# Patient Record
Sex: FEMALE | Race: WHITE | Employment: OTHER | URBAN - METROPOLITAN AREA
[De-identification: names, ages, dates, MRNs, and addresses within clinical notes are randomized per-mention and may not be internally consistent; named-entity substitution may affect disease eponyms.]

---

## 2017-01-18 RX ORDER — BUTALBITAL, ASPIRIN, AND CAFFEINE 325; 50; 40 MG/1; MG/1; MG/1
1 CAPSULE ORAL EVERY 6 HOURS PRN
Qty: 40 CAPSULE | Refills: 2 | Status: SHIPPED | OUTPATIENT
Start: 2017-01-18 | End: 2017-04-14 | Stop reason: SDUPTHER

## 2017-02-07 DIAGNOSIS — K21.9 GASTROESOPHAGEAL REFLUX DISEASE WITHOUT ESOPHAGITIS: ICD-10-CM

## 2017-02-07 RX ORDER — LEVOTHYROXINE SODIUM 88 UG/1
TABLET ORAL
Qty: 90 TABLET | Refills: 0 | Status: SHIPPED | OUTPATIENT
Start: 2017-02-07 | End: 2017-05-14 | Stop reason: SDUPTHER

## 2017-02-07 RX ORDER — PANTOPRAZOLE SODIUM 40 MG/1
TABLET, DELAYED RELEASE ORAL
Qty: 90 TABLET | Refills: 0 | Status: SHIPPED | OUTPATIENT
Start: 2017-02-07 | End: 2017-06-15 | Stop reason: SDUPTHER

## 2017-03-16 RX ORDER — MONTELUKAST SODIUM 10 MG/1
TABLET ORAL
Qty: 90 TABLET | Refills: 0 | Status: SHIPPED | OUTPATIENT
Start: 2017-03-16 | End: 2017-06-15 | Stop reason: SDUPTHER

## 2017-04-06 ENCOUNTER — TELEPHONE (OUTPATIENT)
Dept: FAMILY MEDICINE CLINIC | Age: 52
End: 2017-04-06

## 2017-04-06 RX ORDER — FLUCONAZOLE 150 MG/1
150 TABLET ORAL ONCE
Qty: 2 TABLET | Refills: 0 | Status: SHIPPED | OUTPATIENT
Start: 2017-04-06 | End: 2017-04-06

## 2017-04-15 RX ORDER — BUTALBITAL, ASPIRIN, AND CAFFEINE 325; 50; 40 MG/1; MG/1; MG/1
CAPSULE ORAL
Qty: 40 CAPSULE | Refills: 0 | Status: SHIPPED | OUTPATIENT
Start: 2017-04-15 | End: 2017-05-14 | Stop reason: SDUPTHER

## 2017-05-15 RX ORDER — BUTALBITAL, ASPIRIN, AND CAFFEINE 325; 50; 40 MG/1; MG/1; MG/1
CAPSULE ORAL
Qty: 40 CAPSULE | Refills: 0 | Status: SHIPPED | OUTPATIENT
Start: 2017-05-15 | End: 2017-06-15 | Stop reason: SDUPTHER

## 2017-05-15 RX ORDER — LEVOTHYROXINE SODIUM 88 UG/1
TABLET ORAL
Qty: 90 TABLET | Refills: 0 | Status: SHIPPED | OUTPATIENT
Start: 2017-05-15 | End: 2017-06-15 | Stop reason: SDUPTHER

## 2017-05-25 ENCOUNTER — TELEPHONE (OUTPATIENT)
Dept: FAMILY MEDICINE CLINIC | Age: 52
End: 2017-05-25

## 2017-05-26 ENCOUNTER — OFFICE VISIT (OUTPATIENT)
Dept: FAMILY MEDICINE CLINIC | Age: 52
End: 2017-05-26

## 2017-05-26 VITALS
RESPIRATION RATE: 16 BRPM | WEIGHT: 170 LBS | OXYGEN SATURATION: 97 % | HEART RATE: 84 BPM | DIASTOLIC BLOOD PRESSURE: 70 MMHG | SYSTOLIC BLOOD PRESSURE: 118 MMHG | TEMPERATURE: 97.7 F | BODY MASS INDEX: 29.64 KG/M2

## 2017-05-26 DIAGNOSIS — I10 ESSENTIAL HYPERTENSION: ICD-10-CM

## 2017-05-26 DIAGNOSIS — R73.9 HYPERGLYCEMIA: Primary | ICD-10-CM

## 2017-05-26 LAB
GLUCOSE BLD-MCNC: 93 MG/DL
HBA1C MFR BLD: 5.5 %

## 2017-05-26 PROCEDURE — 82962 GLUCOSE BLOOD TEST: CPT | Performed by: NURSE PRACTITIONER

## 2017-05-26 PROCEDURE — 99213 OFFICE O/P EST LOW 20 MIN: CPT | Performed by: NURSE PRACTITIONER

## 2017-05-26 PROCEDURE — 83036 HEMOGLOBIN GLYCOSYLATED A1C: CPT | Performed by: NURSE PRACTITIONER

## 2017-05-26 ASSESSMENT — ENCOUNTER SYMPTOMS
VOMITING: 0
DIARRHEA: 0
EYE PAIN: 0
SHORTNESS OF BREATH: 0
NAUSEA: 0
COUGH: 0

## 2017-06-15 DIAGNOSIS — K21.9 GASTROESOPHAGEAL REFLUX DISEASE WITHOUT ESOPHAGITIS: ICD-10-CM

## 2017-06-16 RX ORDER — MONTELUKAST SODIUM 10 MG/1
TABLET ORAL
Qty: 90 TABLET | Refills: 0 | Status: SHIPPED | OUTPATIENT
Start: 2017-06-16 | End: 2017-09-09 | Stop reason: SDUPTHER

## 2017-06-16 RX ORDER — LOSARTAN POTASSIUM 25 MG/1
TABLET ORAL
Qty: 90 TABLET | Refills: 0 | Status: SHIPPED | OUTPATIENT
Start: 2017-06-16 | End: 2017-09-09 | Stop reason: SDUPTHER

## 2017-06-16 RX ORDER — BUTALBITAL, ASPIRIN, AND CAFFEINE 325; 50; 40 MG/1; MG/1; MG/1
CAPSULE ORAL
Qty: 40 CAPSULE | Refills: 0 | Status: SHIPPED | OUTPATIENT
Start: 2017-06-16 | End: 2017-07-14 | Stop reason: SDUPTHER

## 2017-06-16 RX ORDER — PANTOPRAZOLE SODIUM 40 MG/1
TABLET, DELAYED RELEASE ORAL
Qty: 90 TABLET | Refills: 0 | Status: SHIPPED | OUTPATIENT
Start: 2017-06-16 | End: 2017-09-14 | Stop reason: SDUPTHER

## 2017-06-16 RX ORDER — LEVOTHYROXINE SODIUM 88 UG/1
TABLET ORAL
Qty: 90 TABLET | Refills: 0 | Status: SHIPPED | OUTPATIENT
Start: 2017-06-16 | End: 2017-11-11 | Stop reason: SDUPTHER

## 2017-06-20 ENCOUNTER — OFFICE VISIT (OUTPATIENT)
Dept: FAMILY MEDICINE CLINIC | Age: 52
End: 2017-06-20

## 2017-06-20 VITALS
WEIGHT: 163 LBS | DIASTOLIC BLOOD PRESSURE: 88 MMHG | HEART RATE: 86 BPM | TEMPERATURE: 98.6 F | HEIGHT: 64 IN | RESPIRATION RATE: 16 BRPM | SYSTOLIC BLOOD PRESSURE: 130 MMHG | BODY MASS INDEX: 27.83 KG/M2

## 2017-06-20 DIAGNOSIS — Z12.31 SCREENING MAMMOGRAM, ENCOUNTER FOR: ICD-10-CM

## 2017-06-20 DIAGNOSIS — S16.1XXS CERVICAL STRAIN, SEQUELA: ICD-10-CM

## 2017-06-20 DIAGNOSIS — E55.9 VITAMIN D DEFICIENCY: ICD-10-CM

## 2017-06-20 DIAGNOSIS — G43.111 INTRACTABLE MIGRAINE WITH AURA WITH STATUS MIGRAINOSUS: ICD-10-CM

## 2017-06-20 DIAGNOSIS — E78.5 HYPERLIPIDEMIA WITH TARGET LDL LESS THAN 130: ICD-10-CM

## 2017-06-20 DIAGNOSIS — Z00.00 WELL ADULT HEALTH CHECK: Primary | ICD-10-CM

## 2017-06-20 DIAGNOSIS — J45.40 MODERATE PERSISTENT ASTHMA WITHOUT COMPLICATION: ICD-10-CM

## 2017-06-20 DIAGNOSIS — E03.9 ACQUIRED HYPOTHYROIDISM: ICD-10-CM

## 2017-06-20 DIAGNOSIS — I10 ESSENTIAL HYPERTENSION: ICD-10-CM

## 2017-06-20 LAB
A/G RATIO: 1.5 (ref 1.1–2.2)
ALBUMIN SERPL-MCNC: 4 G/DL (ref 3.4–5)
ALP BLD-CCNC: 68 U/L (ref 40–129)
ALT SERPL-CCNC: 9 U/L (ref 10–40)
ANION GAP SERPL CALCULATED.3IONS-SCNC: 16 MMOL/L (ref 3–16)
AST SERPL-CCNC: 12 U/L (ref 15–37)
BILIRUB SERPL-MCNC: 0.4 MG/DL (ref 0–1)
BUN BLDV-MCNC: 17 MG/DL (ref 7–20)
CALCIUM SERPL-MCNC: 9.3 MG/DL (ref 8.3–10.6)
CHLORIDE BLD-SCNC: 104 MMOL/L (ref 99–110)
CHOLESTEROL, TOTAL: 184 MG/DL (ref 0–199)
CO2: 22 MMOL/L (ref 21–32)
CREAT SERPL-MCNC: 0.7 MG/DL (ref 0.6–1.1)
GFR AFRICAN AMERICAN: >60
GFR NON-AFRICAN AMERICAN: >60
GLOBULIN: 2.7 G/DL
GLUCOSE BLD-MCNC: 102 MG/DL (ref 70–99)
HDLC SERPL-MCNC: 56 MG/DL (ref 40–60)
LDL CHOLESTEROL CALCULATED: 108 MG/DL
POTASSIUM SERPL-SCNC: 4 MMOL/L (ref 3.5–5.1)
SODIUM BLD-SCNC: 142 MMOL/L (ref 136–145)
TOTAL PROTEIN: 6.7 G/DL (ref 6.4–8.2)
TRIGL SERPL-MCNC: 99 MG/DL (ref 0–150)
TSH SERPL DL<=0.05 MIU/L-ACNC: 1.08 UIU/ML (ref 0.27–4.2)
VITAMIN D 25-HYDROXY: 52.5 NG/ML
VLDLC SERPL CALC-MCNC: 20 MG/DL

## 2017-06-20 PROCEDURE — 99396 PREV VISIT EST AGE 40-64: CPT | Performed by: FAMILY MEDICINE

## 2017-06-20 RX ORDER — DIAZEPAM 5 MG/1
10 TABLET ORAL EVERY 12 HOURS PRN
Qty: 10 TABLET | Refills: 0 | Status: SHIPPED | OUTPATIENT
Start: 2017-06-20 | End: 2017-09-18

## 2017-06-20 ASSESSMENT — PATIENT HEALTH QUESTIONNAIRE - PHQ9
SUM OF ALL RESPONSES TO PHQ QUESTIONS 1-9: 2
1. LITTLE INTEREST OR PLEASURE IN DOING THINGS: 1
SUM OF ALL RESPONSES TO PHQ9 QUESTIONS 1 & 2: 2
2. FEELING DOWN, DEPRESSED OR HOPELESS: 1

## 2017-07-15 RX ORDER — BUTALBITAL, ASPIRIN, AND CAFFEINE 325; 50; 40 MG/1; MG/1; MG/1
CAPSULE ORAL
Qty: 40 CAPSULE | Refills: 0 | Status: SHIPPED | OUTPATIENT
Start: 2017-07-15 | End: 2017-08-15 | Stop reason: SDUPTHER

## 2017-08-07 ENCOUNTER — TELEPHONE (OUTPATIENT)
Dept: FAMILY MEDICINE CLINIC | Age: 52
End: 2017-08-07

## 2017-08-16 RX ORDER — BUTALBITAL, ASPIRIN, AND CAFFEINE 325; 50; 40 MG/1; MG/1; MG/1
CAPSULE ORAL
Qty: 40 CAPSULE | Refills: 3 | Status: SHIPPED | OUTPATIENT
Start: 2017-08-16 | End: 2017-12-15 | Stop reason: SDUPTHER

## 2017-09-09 RX ORDER — MONTELUKAST SODIUM 10 MG/1
TABLET ORAL
Qty: 90 TABLET | Refills: 0 | Status: SHIPPED | OUTPATIENT
Start: 2017-09-09 | End: 2017-12-15 | Stop reason: SDUPTHER

## 2017-09-09 RX ORDER — LOSARTAN POTASSIUM 25 MG/1
TABLET ORAL
Qty: 90 TABLET | Refills: 0 | Status: SHIPPED | OUTPATIENT
Start: 2017-09-09 | End: 2019-03-14 | Stop reason: ALTCHOICE

## 2017-09-14 DIAGNOSIS — K21.9 GASTROESOPHAGEAL REFLUX DISEASE WITHOUT ESOPHAGITIS: ICD-10-CM

## 2017-09-15 RX ORDER — PANTOPRAZOLE SODIUM 40 MG/1
TABLET, DELAYED RELEASE ORAL
Qty: 90 TABLET | Refills: 0 | Status: SHIPPED | OUTPATIENT
Start: 2017-09-15 | End: 2017-12-15 | Stop reason: SDUPTHER

## 2017-09-22 ENCOUNTER — TELEPHONE (OUTPATIENT)
Dept: FAMILY MEDICINE CLINIC | Age: 52
End: 2017-09-22

## 2017-09-22 RX ORDER — FLUCONAZOLE 150 MG/1
150 TABLET ORAL ONCE
Qty: 1 TABLET | Refills: 0 | Status: SHIPPED | OUTPATIENT
Start: 2017-09-22 | End: 2017-09-22

## 2017-10-01 DIAGNOSIS — G43.111 INTRACTABLE MIGRAINE WITH AURA WITH STATUS MIGRAINOSUS: ICD-10-CM

## 2017-10-02 ENCOUNTER — TELEPHONE (OUTPATIENT)
Dept: FAMILY MEDICINE CLINIC | Age: 52
End: 2017-10-02

## 2017-10-02 DIAGNOSIS — G43.111 INTRACTABLE MIGRAINE WITH AURA WITH STATUS MIGRAINOSUS: Primary | ICD-10-CM

## 2017-10-02 DIAGNOSIS — N95.1 MENOPAUSAL SYNDROME: ICD-10-CM

## 2017-10-02 RX ORDER — SUMATRIPTAN 20 MG/1
SPRAY NASAL
Qty: 6 EACH | Refills: 0 | Status: SHIPPED | OUTPATIENT
Start: 2017-10-02 | End: 2018-04-27 | Stop reason: SDUPTHER

## 2017-10-02 NOTE — TELEPHONE ENCOUNTER
PT. called and needs estradiol (ESTRACE) 1 MG tablet Refilled     Side note:  Interested in a Beta Blocker again. Thinking it might help her migraines. ..         Please advise thanks

## 2017-10-04 ENCOUNTER — TELEPHONE (OUTPATIENT)
Dept: FAMILY MEDICINE CLINIC | Age: 52
End: 2017-10-04

## 2017-10-04 RX ORDER — ESTRADIOL 1 MG/1
1 TABLET ORAL DAILY
Qty: 90 TABLET | Refills: 3 | Status: SHIPPED | OUTPATIENT
Start: 2017-10-04 | End: 2020-07-01 | Stop reason: SDUPTHER

## 2017-10-04 RX ORDER — METOPROLOL SUCCINATE 25 MG/1
25 TABLET, EXTENDED RELEASE ORAL DAILY
Qty: 90 TABLET | Refills: 1 | Status: SHIPPED | OUTPATIENT
Start: 2017-10-04 | End: 2018-05-16 | Stop reason: SDUPTHER

## 2017-10-04 RX ORDER — ATENOLOL 25 MG/1
25 TABLET ORAL DAILY
Qty: 90 TABLET | Refills: 1 | Status: SHIPPED | OUTPATIENT
Start: 2017-10-04 | End: 2017-10-04

## 2017-10-04 NOTE — TELEPHONE ENCOUNTER
Patient advised of Dr.lenz cueto. Patient would like to be on on a BP that is a beta blocker instead of taking to BP meds.

## 2017-10-12 ENCOUNTER — TELEPHONE (OUTPATIENT)
Dept: FAMILY MEDICINE CLINIC | Age: 52
End: 2017-10-12

## 2017-10-12 NOTE — TELEPHONE ENCOUNTER
Pt. Called requesting med refill       metoprolol succinate (TOPROL XL) 25 MG extended release tablet       Pt. Is confused on new instructions for the beta blocker. Please call Pt. About this.     Please advised

## 2017-10-18 ENCOUNTER — TELEPHONE (OUTPATIENT)
Dept: FAMILY MEDICINE CLINIC | Age: 52
End: 2017-10-18

## 2017-10-18 ENCOUNTER — E-VISIT (OUTPATIENT)
Dept: FAMILY MEDICINE CLINIC | Age: 52
End: 2017-10-18

## 2017-10-18 DIAGNOSIS — J01.90 ACUTE BACTERIAL SINUSITIS: Primary | ICD-10-CM

## 2017-10-18 DIAGNOSIS — B96.89 ACUTE BACTERIAL SINUSITIS: Primary | ICD-10-CM

## 2017-10-18 PROCEDURE — 99444 PR PHYSICIAN ONLINE EVALUATION & MANAGEMENT SERVICE: CPT | Performed by: FAMILY MEDICINE

## 2017-10-18 RX ORDER — CEFUROXIME AXETIL 500 MG/1
500 TABLET ORAL 2 TIMES DAILY
Qty: 20 TABLET | Refills: 0 | Status: SHIPPED | OUTPATIENT
Start: 2017-10-18 | End: 2017-10-28

## 2017-10-18 RX ORDER — FLUCONAZOLE 150 MG/1
150 TABLET ORAL ONCE
Qty: 2 TABLET | Refills: 0 | Status: SHIPPED | OUTPATIENT
Start: 2017-10-18 | End: 2017-10-18

## 2017-10-18 ASSESSMENT — LIFESTYLE VARIABLES: SMOKING_STATUS: NO

## 2017-10-18 NOTE — TELEPHONE ENCOUNTER
Pt called and wanted to know if you can call in a script for a sinus infection. She said she has a migraine, and has sinus pressure.       Please call pt

## 2017-10-27 ENCOUNTER — OFFICE VISIT (OUTPATIENT)
Dept: FAMILY MEDICINE CLINIC | Age: 52
End: 2017-10-27

## 2017-10-27 VITALS
DIASTOLIC BLOOD PRESSURE: 80 MMHG | RESPIRATION RATE: 18 BRPM | BODY MASS INDEX: 28.51 KG/M2 | HEART RATE: 88 BPM | HEIGHT: 64 IN | WEIGHT: 167 LBS | SYSTOLIC BLOOD PRESSURE: 128 MMHG | TEMPERATURE: 97 F

## 2017-10-27 DIAGNOSIS — B37.2 YEAST DERMATITIS: ICD-10-CM

## 2017-10-27 DIAGNOSIS — T78.40XA ALLERGIC REACTION, INITIAL ENCOUNTER: Primary | ICD-10-CM

## 2017-10-27 PROCEDURE — 99213 OFFICE O/P EST LOW 20 MIN: CPT | Performed by: INTERNAL MEDICINE

## 2017-10-27 RX ORDER — FLUCONAZOLE 150 MG/1
150 TABLET ORAL ONCE
Qty: 2 TABLET | Refills: 0 | Status: SHIPPED | OUTPATIENT
Start: 2017-10-27 | End: 2017-10-27

## 2017-10-27 RX ORDER — PREDNISONE 10 MG/1
TABLET ORAL
Qty: 32 TABLET | Refills: 0 | Status: SHIPPED | OUTPATIENT
Start: 2017-10-27 | End: 2018-02-09

## 2017-10-27 RX ORDER — HYDROXYZINE PAMOATE 25 MG/1
25 CAPSULE ORAL 3 TIMES DAILY PRN
Qty: 30 CAPSULE | Refills: 0 | Status: SHIPPED | OUTPATIENT
Start: 2017-10-27 | End: 2017-11-26

## 2017-10-27 RX ORDER — FAMOTIDINE 20 MG/1
20 TABLET, FILM COATED ORAL 2 TIMES DAILY
Qty: 20 TABLET | Refills: 0 | Status: SHIPPED | OUTPATIENT
Start: 2017-10-27 | End: 2017-11-16 | Stop reason: SDUPTHER

## 2017-10-27 ASSESSMENT — ENCOUNTER SYMPTOMS
WHEEZING: 0
CONSTIPATION: 1
DIARRHEA: 0
COUGH: 0
SHORTNESS OF BREATH: 0

## 2017-10-27 NOTE — PROGRESS NOTES
10/27/2017    Chief Complaint   Patient presents with    Rash     painful, itchy rash all over body, on last day of Ceftin     Took ceftin started on the  18th. Started to vag itching and took diflucan. Then saw dermatology on Tuesday  3 days ago- liq nitrogen to the face. Rash now all over her body -really broke out yest  Red, itchy painful. Still taking the ceftin. She does not want steroids , said she had bad dreams , could not sleep  After much discussion she did agree to take steroids. She also has Benadryl listed as an allergy but she doesn't recall that being an allergy. She states that she taken   Benadryl when mowing the lawn         HPI    Review of Systems   Constitutional: Negative for chills and fever. Respiratory: Negative for cough, shortness of breath and wheezing. Gastrointestinal: Positive for constipation (ibs with constipation). Negative for diarrhea. Skin: Positive for rash.         Terrible itching       Health Maintenance   Topic Date Due    Flu vaccine (1) 09/01/2017    Breast cancer screen  06/24/2019    Diabetes screen  05/26/2020    Lipid screen  06/20/2022    DTaP/Tdap/Td vaccine (3 - Td) 01/17/2025    Colon cancer screen colonoscopy  12/15/2025    Pneumococcal med risk  Completed    Hepatitis C screen  Completed    HIV screen  Completed      Social History     Social History    Marital status:      Spouse name: N/A    Number of children: N/A    Years of education: N/A     Social History Main Topics    Smoking status: Never Smoker    Smokeless tobacco: Never Used    Alcohol use 0.0 oz/week      Comment: rare    Drug use: No    Sexual activity: Yes     Partners: Male      Comment:      Other Topics Concern    None     Social History Narrative        Exercise: walking three times a week    Seatbelt use: Always    Living will: no,   additional information provided     Family History   Problem Relation Age of Onset    High Blood Pressure Mother     Cancer Mother      BREAST    Substance Abuse Maternal Grandmother      ALCOHOL    Heart Disease Maternal Grandmother     High Blood Pressure Maternal Grandfather     Early Death Father 35     accidental overdose     Prior to Visit Medications    Medication Sig Taking? Authorizing Provider   cefUROXime (CEFTIN) 500 MG tablet Take 1 tablet by mouth 2 times daily for 10 days Yes Sameul Buerger, MD   estradiol (ESTRACE) 1 MG tablet Take 1 tablet by mouth daily Yes Sameul Buerger, MD   metoprolol succinate (TOPROL XL) 25 MG extended release tablet Take 1 tablet by mouth daily Yes Sameul Buerger, MD   SUMAtriptan (IMITREX) 20 MG/ACT nasal spray USE 1 SPRAY NASALLY DAILY AS NEEDED FOR MIGRAINE Yes Sameul Buerger, MD   pantoprazole (PROTONIX) 40 MG tablet TAKE 1 TABLET BY MOUTH DAILY Yes Sameul Buerger, MD   losartan (COZAAR) 25 MG tablet TAKE 1 TABLET BY MOUTH DAILY Yes Sameul Buerger, MD   montelukast (SINGULAIR) 10 MG tablet TAKE 1 TABLET BY MOUTH EVERY NIGHT AT BEDTIME Yes Sameul Buerger, MD   butalbital-aspirin-caffeine (FIORINAL) -40 MG capsule TAKE 1 CAPSULE BY MOUTH EVERY 6 HOURS AS NEEDED FOR HEADACHES. MAX 40 PER MONTH Yes Sameul Buerger, MD   levothyroxine (SYNTHROID) 88 MCG tablet TAKE 1 TABLET BY MOUTH EVERY DAY Yes Sameul Buerger, MD   Blood Pressure Monitor KIT Daily as needed Yes Sameul Buerger, MD   dicyclomine (BENTYL) 20 MG tablet TAKE 1 TABLET BY MOUTH THREE TIMES DAILY AS NEEDED Yes Sameul Buerger, MD   calcium carbonate (OSCAL) 500 MG TABS tablet Take 500 mg by mouth daily Yes Historical Provider, MD   albuterol (PROVENTIL HFA;VENTOLIN HFA) 108 (90 BASE) MCG/ACT inhaler Inhale 2 puffs into the lungs every 4 hours as needed for Wheezing May substitute for insurance preferred (Ventolin, Proventil, ProAir) Yes Sameul Buerger, MD   fluticasone (FLONASE) 50 MCG/ACT nasal spray 1 spray by Nasal route daily. Yes Sameul Buerger, MD   fexofenadine (ALLEGRA) 180 MG tablet Take 1 tablet by mouth daily.  Yes Zach Greenberg 167 lb (75.8 kg)   BMI 28.67 kg/m²    Physical Exam   Constitutional: She appears well-developed and well-nourished. HENT:   Head: Normocephalic and atraumatic. Cardiovascular: Normal rate and regular rhythm. No murmur heard. Pulmonary/Chest: Breath sounds normal. She has no wheezes. Abdominal: Soft. There is no tenderness. Skin: Skin is warm and dry. Patient has multiple pink lesions on her trunk and her upper extremities she has significant erythema in her inner thighs and under her breast.  Under her breast looks like more yeast infection    She is also very red in the back of her neck    Multiple red marks on her face and she is status post liquid nitrogen treatments to the face   Psychiatric: She has a normal mood and affect. Her behavior is normal. Judgment and thought content normal.     Wt Readings from Last 3 Encounters:   10/27/17 167 lb (75.8 kg)   06/20/17 163 lb (73.9 kg)   05/26/17 170 lb (77.1 kg)     BP Readings from Last 3 Encounters:   10/27/17 128/80   06/20/17 130/88   05/26/17 118/70       ASSESSMENT/PLAN:  Wing Levine was seen today for rash. Diagnoses and all orders for this visit:    Allergic reaction, initial encounter    Other orders  -     hydrOXYzine (VISTARIL) 25 MG capsule; Take 1 capsule by mouth 3 times daily as needed for Itching  -     famotidine (PEPCID) 20 MG tablet; Take 1 tablet by mouth 2 times daily  -     predniSONE (DELTASONE) 10 MG tablet; Take 6 po daily for  2 days; Take 4 po daily for 2 days; Take 3 po daily  for 2 days; Take 2 po daily for 2 days; Take 1 po daily for 2 days then stop  -     fluconazole (DIFLUCAN) 150 MG tablet; Take 1 tablet by mouth once for 1 dose Repeat in 3 days. Generic prescription is fine     she has significant allergic reaction probably to Ceftin. She was very reluctant to take steroids agreed due to side effects in the past.  I've asked her to take Benadryl and she can take Vistaril but to space them apart about 4 hours.   She is continually scratching when I was in the room and has very significant rash.   She also appears to have yeast dermatitis under her breast and in her upper thighs

## 2017-10-27 NOTE — PROGRESS NOTES
10/27/2017    Chief Complaint   Patient presents with    Rash     painful, itchy rash all over body, on last day of Ceftin       HPI    Review of Systems    Health Maintenance   Topic Date Due    Flu vaccine (1) 09/01/2017    Breast cancer screen  06/24/2019    Diabetes screen  05/26/2020    Lipid screen  06/20/2022    DTaP/Tdap/Td vaccine (3 - Td) 01/17/2025    Colon cancer screen colonoscopy  12/15/2025    Pneumococcal med risk  Completed    Hepatitis C screen  Completed    HIV screen  Completed      Social History     Social History    Marital status:      Spouse name: N/A    Number of children: N/A    Years of education: N/A     Social History Main Topics    Smoking status: Never Smoker    Smokeless tobacco: Never Used    Alcohol use 0.0 oz/week      Comment: rare    Drug use: No    Sexual activity: Yes     Partners: Male      Comment:      Other Topics Concern    None     Social History Narrative        Exercise: walking three times a week    Seatbelt use: Always    Living will: no,   additional information provided     Family History   Problem Relation Age of Onset    High Blood Pressure Mother     Cancer Mother      BREAST    Substance Abuse Maternal Grandmother      ALCOHOL    Heart Disease Maternal Grandmother     High Blood Pressure Maternal Grandfather     Early Death Father 35     accidental overdose     Prior to Visit Medications    Medication Sig Taking? Authorizing Provider   hydrOXYzine (VISTARIL) 25 MG capsule Take 1 capsule by mouth 3 times daily as needed for Itching Yes Kleber Joya MD   famotidine (PEPCID) 20 MG tablet Take 1 tablet by mouth 2 times daily Yes Kleber Joya MD   predniSONE (DELTASONE) 10 MG tablet Take 6 po daily for  2 days; Take 4 po daily for 2 days; Take 3 po daily  for 2 days; Take 2 po daily for 2 days;  Take 1 po daily for 2 days then stop Yes Kleber Joya MD   cefUROXime (CEFTIN) 500 MG tablet Take 1 tablet by mouth 2 (gastroesophageal reflux disease)    Allergic rhinitis    Migraine with aura    Screening mammogram, encounter for    Cervical dysplasia- S/P hyst, PAPs per gyn    Hypothyroidism    Vitamin D deficiency    Screen for colon cancer    Chronic sinusitis    Osteopenia    Well adult health check    Sacroiliac joint dysfunction of left side    Hyperlipidemia with target LDL less than 130    AK (actinic keratosis)    Biliary dyskinesia        LABS:   Lab Results   Component Value Date    GLUCOSE 102 (H) 06/20/2017     Lab Results   Component Value Date     06/20/2017    K 4.0 06/20/2017    CREATININE 0.7 06/20/2017     Cholesterol, Total   Date Value Ref Range Status   06/20/2017 184 0 - 199 mg/dL Final     LDL Calculated   Date Value Ref Range Status   06/20/2017 108 (H) <100 mg/dL Final     HDL   Date Value Ref Range Status   06/20/2017 56 40 - 60 mg/dL Final     Triglycerides   Date Value Ref Range Status   06/20/2017 99 0 - 150 mg/dL Final     Lab Results   Component Value Date    ALT 9 (L) 06/20/2017    AST 12 (L) 06/20/2017    ALKPHOS 68 06/20/2017    BILITOT 0.4 06/20/2017      Lab Results   Component Value Date    WBC 13.5 (H) 11/21/2016    HGB 14.0 11/21/2016    HCT 42.5 11/21/2016    MCV 90.6 11/21/2016     11/21/2016     TSH (uIU/mL)   Date Value   06/20/2017 1.08     Lab Results   Component Value Date    LABA1C 5.5 05/26/2017     No results found for: PSA, PSADIA     PHYSICAL EXAM:  /80 (Site: Left Arm, Position: Sitting, Cuff Size: Small Adult)   Pulse 88   Temp 97 °F (36.1 °C) (Axillary)   Resp 18   Ht 5' 4\" (1.626 m)   Wt 167 lb (75.8 kg)   BMI 28.67 kg/m²    Physical Exam  Wt Readings from Last 3 Encounters:   10/27/17 167 lb (75.8 kg)   06/20/17 163 lb (73.9 kg)   05/26/17 170 lb (77.1 kg)     BP Readings from Last 3 Encounters:   10/27/17 128/80   06/20/17 130/88   05/26/17 118/70       ASSESSMENT/PLAN:  There are no diagnoses linked to this encounter.     No Follow-up on file. Return sooner if having any problems.

## 2017-10-30 ENCOUNTER — TELEPHONE (OUTPATIENT)
Dept: FAMILY MEDICINE CLINIC | Age: 52
End: 2017-10-30

## 2017-10-30 NOTE — TELEPHONE ENCOUNTER
Pt called and said she was in Friday and saw dr Dora Mcgovern and was prescribed a steroid for a allergic reaction to her antibiotic and she is saying she has a hard time on steroids and wanted to know if she can shorten the time she has to take these. Pt would like to speak with someone regarding this.       Please call pt

## 2017-11-11 RX ORDER — LEVOTHYROXINE SODIUM 88 UG/1
TABLET ORAL
Qty: 90 TABLET | Refills: 0 | Status: SHIPPED | OUTPATIENT
Start: 2017-11-11 | End: 2018-02-10 | Stop reason: SDUPTHER

## 2017-11-14 NOTE — TELEPHONE ENCOUNTER
Spoke to pt about her medications being refilled, about her F/U appt in Dec with Dr. Agnieszka Torre. She now has an apt scheduled for 12.15.17 @ 3:00 with Dr. Rnoi Ramirez. Thanks.

## 2017-11-16 ENCOUNTER — NURSE ONLY (OUTPATIENT)
Dept: FAMILY MEDICINE CLINIC | Age: 52
End: 2017-11-16

## 2017-11-16 DIAGNOSIS — Z23 NEEDS FLU SHOT: Primary | ICD-10-CM

## 2017-11-16 PROCEDURE — 90630 INFLUENZA, QUADV, 18-64 YRS, ID, PF, MICRO INJ, 0.1ML (FLUZONE QUADV, PF): CPT | Performed by: FAMILY MEDICINE

## 2017-11-16 PROCEDURE — 90471 IMMUNIZATION ADMIN: CPT | Performed by: FAMILY MEDICINE

## 2017-11-16 NOTE — PROGRESS NOTES
Vaccine Information Sheet, \"Influenza - Inactivated\"  given to Lorie Madrid, or parent/legal guardian of  Lorie Madrid and verbalized understanding. Patient responses:    Have you ever had a reaction to a flu vaccine? No  Are you able to eat eggs without adverse effects? Yes  Do you have any current illness? No  Have you ever had Guillian Bryan Syndrome? No    Flu vaccine given per order. Please see immunization tab.

## 2017-11-17 RX ORDER — FAMOTIDINE 20 MG/1
TABLET, FILM COATED ORAL
Qty: 180 TABLET | Refills: 0 | Status: SHIPPED | OUTPATIENT
Start: 2017-11-17 | End: 2018-02-09

## 2017-11-17 RX ORDER — FAMOTIDINE 20 MG/1
TABLET, FILM COATED ORAL
Qty: 60 TABLET | Refills: 0 | Status: SHIPPED | OUTPATIENT
Start: 2017-11-17 | End: 2017-11-17 | Stop reason: SDUPTHER

## 2017-11-18 ENCOUNTER — TELEPHONE (OUTPATIENT)
Dept: FAMILY MEDICINE CLINIC | Age: 52
End: 2017-11-18

## 2017-11-18 NOTE — TELEPHONE ENCOUNTER
Pt had a flu shot Thursday  She noticed this morning that she is red and swollen (the size the a half dollar) around the injection site  It doesn't itch but it is a little sore  Please advise if there is anything that she needs to do

## 2017-12-15 ENCOUNTER — OFFICE VISIT (OUTPATIENT)
Dept: FAMILY MEDICINE CLINIC | Age: 52
End: 2017-12-15

## 2017-12-15 VITALS
BODY MASS INDEX: 29.02 KG/M2 | DIASTOLIC BLOOD PRESSURE: 80 MMHG | HEIGHT: 64 IN | RESPIRATION RATE: 20 BRPM | WEIGHT: 170 LBS | TEMPERATURE: 98.1 F | HEART RATE: 88 BPM | SYSTOLIC BLOOD PRESSURE: 132 MMHG

## 2017-12-15 DIAGNOSIS — G43.111 INTRACTABLE MIGRAINE WITH AURA WITH STATUS MIGRAINOSUS: ICD-10-CM

## 2017-12-15 DIAGNOSIS — K21.9 GASTROESOPHAGEAL REFLUX DISEASE WITHOUT ESOPHAGITIS: ICD-10-CM

## 2017-12-15 DIAGNOSIS — I10 ESSENTIAL HYPERTENSION: Primary | ICD-10-CM

## 2017-12-15 PROCEDURE — 99214 OFFICE O/P EST MOD 30 MIN: CPT | Performed by: FAMILY MEDICINE

## 2017-12-15 RX ORDER — TOPIRAMATE 25 MG/1
TABLET ORAL
Qty: 180 TABLET | Refills: 0 | Status: SHIPPED | OUTPATIENT
Start: 2017-12-15 | End: 2019-12-23

## 2017-12-15 RX ORDER — BUTALBITAL, ASPIRIN, AND CAFFEINE 325; 50; 40 MG/1; MG/1; MG/1
CAPSULE ORAL
Qty: 40 CAPSULE | Refills: 3 | Status: SHIPPED | OUTPATIENT
Start: 2017-12-15 | End: 2018-04-27 | Stop reason: SDUPTHER

## 2017-12-15 RX ORDER — TOPIRAMATE 25 MG/1
25-50 TABLET ORAL NIGHTLY
Qty: 60 TABLET | Refills: 3 | Status: SHIPPED | OUTPATIENT
Start: 2017-12-15 | End: 2017-12-15 | Stop reason: SDUPTHER

## 2017-12-18 RX ORDER — MONTELUKAST SODIUM 10 MG/1
TABLET ORAL
Qty: 90 TABLET | Refills: 1 | Status: SHIPPED | OUTPATIENT
Start: 2017-12-18 | End: 2018-06-19 | Stop reason: SDUPTHER

## 2017-12-18 RX ORDER — PANTOPRAZOLE SODIUM 40 MG/1
TABLET, DELAYED RELEASE ORAL
Qty: 90 TABLET | Refills: 3 | Status: SHIPPED | OUTPATIENT
Start: 2017-12-18 | End: 2018-10-16 | Stop reason: SDUPTHER

## 2018-01-12 DIAGNOSIS — K58.9 IBS (IRRITABLE BOWEL SYNDROME): ICD-10-CM

## 2018-01-12 RX ORDER — DICYCLOMINE HCL 20 MG
TABLET ORAL
Qty: 90 TABLET | Refills: 0 | Status: SHIPPED | OUTPATIENT
Start: 2018-01-12 | End: 2018-04-27 | Stop reason: SDUPTHER

## 2018-02-09 ENCOUNTER — OFFICE VISIT (OUTPATIENT)
Dept: FAMILY MEDICINE CLINIC | Age: 53
End: 2018-02-09

## 2018-02-09 VITALS
DIASTOLIC BLOOD PRESSURE: 80 MMHG | RESPIRATION RATE: 16 BRPM | TEMPERATURE: 98.1 F | WEIGHT: 171 LBS | SYSTOLIC BLOOD PRESSURE: 136 MMHG | BODY MASS INDEX: 29.19 KG/M2 | HEART RATE: 74 BPM | HEIGHT: 64 IN

## 2018-02-09 DIAGNOSIS — E03.9 ACQUIRED HYPOTHYROIDISM: ICD-10-CM

## 2018-02-09 DIAGNOSIS — R10.84 GENERALIZED ABDOMINAL PAIN: ICD-10-CM

## 2018-02-09 DIAGNOSIS — I10 ESSENTIAL HYPERTENSION: ICD-10-CM

## 2018-02-09 DIAGNOSIS — R10.84 GENERALIZED ABDOMINAL PAIN: Primary | ICD-10-CM

## 2018-02-09 DIAGNOSIS — K58.9 IRRITABLE BOWEL SYNDROME WITHOUT DIARRHEA: ICD-10-CM

## 2018-02-09 LAB
A/G RATIO: 1.4 (ref 1.1–2.2)
ALBUMIN SERPL-MCNC: 4.1 G/DL (ref 3.4–5)
ALP BLD-CCNC: 80 U/L (ref 40–129)
ALT SERPL-CCNC: 7 U/L (ref 10–40)
ANION GAP SERPL CALCULATED.3IONS-SCNC: 14 MMOL/L (ref 3–16)
AST SERPL-CCNC: 12 U/L (ref 15–37)
BASOPHILS ABSOLUTE: 0.1 K/UL (ref 0–0.2)
BASOPHILS RELATIVE PERCENT: 0.5 %
BILIRUB SERPL-MCNC: 0.3 MG/DL (ref 0–1)
BUN BLDV-MCNC: 14 MG/DL (ref 7–20)
CALCIUM SERPL-MCNC: 9 MG/DL (ref 8.3–10.6)
CHLORIDE BLD-SCNC: 102 MMOL/L (ref 99–110)
CO2: 24 MMOL/L (ref 21–32)
CREAT SERPL-MCNC: 0.8 MG/DL (ref 0.6–1.1)
EOSINOPHILS ABSOLUTE: 0.2 K/UL (ref 0–0.6)
EOSINOPHILS RELATIVE PERCENT: 1.4 %
GFR AFRICAN AMERICAN: >60
GFR NON-AFRICAN AMERICAN: >60
GLOBULIN: 2.9 G/DL
GLUCOSE BLD-MCNC: 96 MG/DL (ref 70–99)
HCT VFR BLD CALC: 43.4 % (ref 36–48)
HEMOGLOBIN: 14.5 G/DL (ref 12–16)
LIPASE: 37 U/L (ref 13–60)
LYMPHOCYTES ABSOLUTE: 4 K/UL (ref 1–5.1)
LYMPHOCYTES RELATIVE PERCENT: 33.9 %
MCH RBC QN AUTO: 30.5 PG (ref 26–34)
MCHC RBC AUTO-ENTMCNC: 33.3 G/DL (ref 31–36)
MCV RBC AUTO: 91.4 FL (ref 80–100)
MONOCYTES ABSOLUTE: 0.9 K/UL (ref 0–1.3)
MONOCYTES RELATIVE PERCENT: 7.5 %
NEUTROPHILS ABSOLUTE: 6.6 K/UL (ref 1.7–7.7)
NEUTROPHILS RELATIVE PERCENT: 56.7 %
PDW BLD-RTO: 13.4 % (ref 12.4–15.4)
PLATELET # BLD: 297 K/UL (ref 135–450)
PMV BLD AUTO: 8.6 FL (ref 5–10.5)
POTASSIUM SERPL-SCNC: 4.1 MMOL/L (ref 3.5–5.1)
RBC # BLD: 4.75 M/UL (ref 4–5.2)
SEDIMENTATION RATE, ERYTHROCYTE: 12 MM/HR (ref 0–30)
SODIUM BLD-SCNC: 140 MMOL/L (ref 136–145)
TOTAL PROTEIN: 7 G/DL (ref 6.4–8.2)
TSH SERPL DL<=0.05 MIU/L-ACNC: 3.61 UIU/ML (ref 0.27–4.2)
WBC # BLD: 11.7 K/UL (ref 4–11)

## 2018-02-09 PROCEDURE — 99214 OFFICE O/P EST MOD 30 MIN: CPT | Performed by: FAMILY MEDICINE

## 2018-02-09 NOTE — PATIENT INSTRUCTIONS
certain foods. Women who have IBS may notice more frequent symptoms during their menstrual periods. Causes & Risk Factors   Do certain foods cause IBS? No. Foods don't cause IBS. But some foods may make you feel worse. Foods that may make symptoms worse include the following:  Drinks with caffeine, such as coffee, tea or soda   Milk products   Alcohol   Chocolate   Wheat, rye or barley   Keeping a diary for a few weeks may be a good way to find out if a food bothers you. Record what you eat and what your symptoms are. If you notice a pattern or think a food makes you feel worse, don't eat it. But don't cut out foods unless they have caused you problems more than once. If gas is a problem for you, you might want to avoid foods that tend to make gas worse. These include beans, cabbage and some fruits. If milk and other dairy products bother you, you may have lactose intolerance. Lactose intolerance means that your body can't digest lactose (the sugar in milk). If this seems to be the case, you may need to limit the amount of milk and milk products in your diet. Talk to your family doctor if you think you have trouble digesting dairy products. How can stress affect IBS? Stress may trigger symptoms in people who have IBS. Talk to your family doctor about ways to deal with stress, such as exercise, relaxation training or meditation. He or she may have some suggestions or may refer you to someone who can give you some ideas. Your doctor may also suggest that you talk to a counselor about things that are bothering you. Diagnosis & Tests   How is IBS diagnosed? Your doctor may start by asking you questions about your symptoms. If your symptoms have had a pattern over time, the pattern may make it clear to your doctor that IBS is the cause. If your symptoms have just started, something else may be the cause.  Your doctor may need to do some tests, such as a blood test or colonoscopy, to make sure that

## 2018-02-09 NOTE — PROGRESS NOTES
unlabored  · clear to auscultation bilaterally and good air movement  CARDIOVASC:   · regular rate and rhythm, S1, S2 normal. No murmur, click, rub or gallop  · Apical impulse normal  LEGS:  Lower extremity edema: none    ABDOMEN:   · Soft, mild diffuse tenderness, no masses  · No hepatosplenomegaly  · No hernias noted. Exam limited by N/A  PSYCH:    · Alert and oriented, anxious  · Normal reasoning, insight good  · Facial expressions full, mood appropriate  · No memory disturbance noted     Assessment and Plan:     1. Generalized abdominal pain  Comprehensive Metabolic Panel    CBC Auto Differential    Sedimentation Rate    Lipase   2. Irritable bowel syndrome without diarrhea     3. Essential hypertension  Comprehensive Metabolic Panel   4. Acquired hypothyroidism  TSH without Reflex     INSTRUCTIONS  · NEXT APPOINTMENT: Please schedule check-up in 3 weeks. · PLEASE TAKE THIS FORM TO CHECK-OUT WINDOW TO SCHEDULE NEXT VISIT. · PLEASE GET BLOODWORK DRAWN TODAY ON FIRST FLOOR in 170. Take orders with you. RESULTS- most blood tests back in couple days. We will call you if any problems. If bloodwork good, you will get letter in mail or notified thru [address] (if signed up) within 2 weeks. If you do not, please call office. OTC Culturelle/acidophilus/probiotic while on antibiotic to prevent complications. Start bentyl three times per day EVERY day. Consider Linzess or Elavil 10 mg if fails.

## 2018-02-12 RX ORDER — LEVOTHYROXINE SODIUM 88 UG/1
TABLET ORAL
Qty: 90 TABLET | Refills: 0 | Status: SHIPPED | OUTPATIENT
Start: 2018-02-12 | End: 2018-05-16 | Stop reason: SDUPTHER

## 2018-02-13 ENCOUNTER — TELEPHONE (OUTPATIENT)
Dept: FAMILY MEDICINE CLINIC | Age: 53
End: 2018-02-13

## 2018-03-16 ENCOUNTER — OFFICE VISIT (OUTPATIENT)
Dept: FAMILY MEDICINE CLINIC | Age: 53
End: 2018-03-16

## 2018-03-16 VITALS
HEIGHT: 64 IN | RESPIRATION RATE: 16 BRPM | WEIGHT: 175 LBS | HEART RATE: 78 BPM | BODY MASS INDEX: 29.88 KG/M2 | TEMPERATURE: 97.9 F | SYSTOLIC BLOOD PRESSURE: 130 MMHG | DIASTOLIC BLOOD PRESSURE: 80 MMHG

## 2018-03-16 DIAGNOSIS — J30.9 CHRONIC ALLERGIC RHINITIS, UNSPECIFIED SEASONALITY, UNSPECIFIED TRIGGER: ICD-10-CM

## 2018-03-16 DIAGNOSIS — G43.111 INTRACTABLE MIGRAINE WITH AURA WITH STATUS MIGRAINOSUS: ICD-10-CM

## 2018-03-16 DIAGNOSIS — K58.9 IRRITABLE BOWEL SYNDROME WITHOUT DIARRHEA: Primary | ICD-10-CM

## 2018-03-16 DIAGNOSIS — S16.1XXD STRAIN OF NECK MUSCLE, SUBSEQUENT ENCOUNTER: ICD-10-CM

## 2018-03-16 DIAGNOSIS — F39 EPISODIC MOOD DISORDER (HCC): ICD-10-CM

## 2018-03-16 PROCEDURE — 99214 OFFICE O/P EST MOD 30 MIN: CPT | Performed by: FAMILY MEDICINE

## 2018-04-27 DIAGNOSIS — K58.9 IBS (IRRITABLE BOWEL SYNDROME): ICD-10-CM

## 2018-04-27 DIAGNOSIS — G43.111 INTRACTABLE MIGRAINE WITH AURA WITH STATUS MIGRAINOSUS: ICD-10-CM

## 2018-04-27 RX ORDER — BUTALBITAL, ASPIRIN, AND CAFFEINE 325; 50; 40 MG/1; MG/1; MG/1
CAPSULE ORAL
Qty: 40 CAPSULE | Refills: 0 | Status: SHIPPED | OUTPATIENT
Start: 2018-04-27 | End: 2018-05-30 | Stop reason: SDUPTHER

## 2018-04-27 RX ORDER — SUMATRIPTAN 20 MG/1
SPRAY NASAL
Qty: 8 EACH | Refills: 0 | Status: SHIPPED | OUTPATIENT
Start: 2018-04-27 | End: 2018-05-27 | Stop reason: SDUPTHER

## 2018-04-27 RX ORDER — DICYCLOMINE HCL 20 MG
TABLET ORAL
Qty: 90 TABLET | Refills: 0 | Status: SHIPPED | OUTPATIENT
Start: 2018-04-27 | End: 2018-05-26 | Stop reason: SDUPTHER

## 2018-05-16 RX ORDER — METOPROLOL SUCCINATE 25 MG/1
25 TABLET, EXTENDED RELEASE ORAL DAILY
Qty: 90 TABLET | Refills: 0 | Status: SHIPPED | OUTPATIENT
Start: 2018-05-16 | End: 2018-08-12 | Stop reason: SDUPTHER

## 2018-05-16 RX ORDER — LEVOTHYROXINE SODIUM 88 UG/1
TABLET ORAL
Qty: 90 TABLET | Refills: 0 | Status: SHIPPED | OUTPATIENT
Start: 2018-05-16 | End: 2018-08-12 | Stop reason: SDUPTHER

## 2018-05-26 DIAGNOSIS — K58.9 IBS (IRRITABLE BOWEL SYNDROME): ICD-10-CM

## 2018-05-27 DIAGNOSIS — G43.111 INTRACTABLE MIGRAINE WITH AURA WITH STATUS MIGRAINOSUS: ICD-10-CM

## 2018-05-29 RX ORDER — SUMATRIPTAN 20 MG/1
SPRAY NASAL
Qty: 1 EACH | Refills: 2 | Status: SHIPPED | OUTPATIENT
Start: 2018-05-29 | End: 2019-03-28 | Stop reason: SDUPTHER

## 2018-05-29 RX ORDER — DICYCLOMINE HCL 20 MG
TABLET ORAL
Qty: 90 TABLET | Refills: 0 | Status: SHIPPED | OUTPATIENT
Start: 2018-05-29 | End: 2018-07-30 | Stop reason: SDUPTHER

## 2018-05-30 DIAGNOSIS — G43.111 INTRACTABLE MIGRAINE WITH AURA WITH STATUS MIGRAINOSUS: Primary | ICD-10-CM

## 2018-05-31 RX ORDER — BUTALBITAL, ASPIRIN, AND CAFFEINE 325; 50; 40 MG/1; MG/1; MG/1
CAPSULE ORAL
Qty: 40 CAPSULE | Refills: 0 | Status: SHIPPED | OUTPATIENT
Start: 2018-05-31 | End: 2018-06-28 | Stop reason: SDUPTHER

## 2018-06-20 RX ORDER — MONTELUKAST SODIUM 10 MG/1
TABLET ORAL
Qty: 90 TABLET | Refills: 0 | Status: SHIPPED | OUTPATIENT
Start: 2018-06-20 | End: 2018-09-17 | Stop reason: SDUPTHER

## 2018-06-28 DIAGNOSIS — G43.111 INTRACTABLE MIGRAINE WITH AURA WITH STATUS MIGRAINOSUS: ICD-10-CM

## 2018-06-29 RX ORDER — BUTALBITAL, ASPIRIN, AND CAFFEINE 325; 50; 40 MG/1; MG/1; MG/1
CAPSULE ORAL
Qty: 40 CAPSULE | Refills: 0 | Status: SHIPPED | OUTPATIENT
Start: 2018-06-29 | End: 2018-07-30 | Stop reason: SDUPTHER

## 2018-07-30 DIAGNOSIS — G43.111 INTRACTABLE MIGRAINE WITH AURA WITH STATUS MIGRAINOSUS: ICD-10-CM

## 2018-07-30 DIAGNOSIS — K58.9 IBS (IRRITABLE BOWEL SYNDROME): ICD-10-CM

## 2018-07-30 RX ORDER — BUTALBITAL, ASPIRIN, AND CAFFEINE 325; 50; 40 MG/1; MG/1; MG/1
CAPSULE ORAL
Qty: 40 CAPSULE | Refills: 0 | Status: SHIPPED | OUTPATIENT
Start: 2018-07-30 | End: 2018-08-29 | Stop reason: SDUPTHER

## 2018-07-30 RX ORDER — DICYCLOMINE HCL 20 MG
TABLET ORAL
Qty: 90 TABLET | Refills: 0 | Status: SHIPPED | OUTPATIENT
Start: 2018-07-30 | End: 2018-09-08 | Stop reason: SDUPTHER

## 2018-08-13 RX ORDER — METOPROLOL SUCCINATE 25 MG/1
25 TABLET, EXTENDED RELEASE ORAL DAILY
Qty: 90 TABLET | Refills: 0 | Status: SHIPPED | OUTPATIENT
Start: 2018-08-13 | End: 2018-10-30 | Stop reason: SDUPTHER

## 2018-08-13 RX ORDER — LEVOTHYROXINE SODIUM 88 UG/1
TABLET ORAL
Qty: 90 TABLET | Refills: 0 | Status: SHIPPED | OUTPATIENT
Start: 2018-08-13 | End: 2018-10-30 | Stop reason: SDUPTHER

## 2018-08-29 DIAGNOSIS — G43.111 INTRACTABLE MIGRAINE WITH AURA WITH STATUS MIGRAINOSUS: ICD-10-CM

## 2018-08-30 RX ORDER — BUTALBITAL, ASPIRIN, AND CAFFEINE 325; 50; 40 MG/1; MG/1; MG/1
CAPSULE ORAL
Qty: 40 CAPSULE | Refills: 0 | Status: SHIPPED | OUTPATIENT
Start: 2018-08-30 | End: 2018-09-28 | Stop reason: SDUPTHER

## 2018-09-08 DIAGNOSIS — K58.9 IBS (IRRITABLE BOWEL SYNDROME): ICD-10-CM

## 2018-09-10 NOTE — TELEPHONE ENCOUNTER
Scheduled patient for a Saturday because she can only come in during the week after 3-3:30pm Dr. Rui Vu is usually gone by then.

## 2018-09-11 RX ORDER — DICYCLOMINE HCL 20 MG
TABLET ORAL
Qty: 90 TABLET | Refills: 0 | Status: SHIPPED | OUTPATIENT
Start: 2018-09-11 | End: 2018-10-30 | Stop reason: SDUPTHER

## 2018-09-18 RX ORDER — MONTELUKAST SODIUM 10 MG/1
TABLET ORAL
Qty: 90 TABLET | Refills: 0 | Status: SHIPPED | OUTPATIENT
Start: 2018-09-18 | End: 2018-12-18 | Stop reason: SDUPTHER

## 2018-09-28 DIAGNOSIS — G43.111 INTRACTABLE MIGRAINE WITH AURA WITH STATUS MIGRAINOSUS: ICD-10-CM

## 2018-09-28 RX ORDER — BUTALBITAL, ASPIRIN, AND CAFFEINE 325; 50; 40 MG/1; MG/1; MG/1
CAPSULE ORAL
Qty: 40 CAPSULE | Refills: 0 | Status: SHIPPED | OUTPATIENT
Start: 2018-09-28 | End: 2018-10-16 | Stop reason: SDUPTHER

## 2018-10-16 ENCOUNTER — OFFICE VISIT (OUTPATIENT)
Dept: FAMILY MEDICINE CLINIC | Age: 53
End: 2018-10-16
Payer: COMMERCIAL

## 2018-10-16 VITALS
DIASTOLIC BLOOD PRESSURE: 80 MMHG | HEIGHT: 63 IN | SYSTOLIC BLOOD PRESSURE: 128 MMHG | BODY MASS INDEX: 30.12 KG/M2 | RESPIRATION RATE: 12 BRPM | HEART RATE: 68 BPM | WEIGHT: 170 LBS

## 2018-10-16 DIAGNOSIS — J45.20 MILD INTERMITTENT ASTHMA WITHOUT COMPLICATION: ICD-10-CM

## 2018-10-16 DIAGNOSIS — E03.9 ACQUIRED HYPOTHYROIDISM: ICD-10-CM

## 2018-10-16 DIAGNOSIS — E55.9 VITAMIN D DEFICIENCY: ICD-10-CM

## 2018-10-16 DIAGNOSIS — Z00.00 WELL ADULT HEALTH CHECK: Primary | ICD-10-CM

## 2018-10-16 DIAGNOSIS — K21.9 GASTROESOPHAGEAL REFLUX DISEASE WITHOUT ESOPHAGITIS: ICD-10-CM

## 2018-10-16 DIAGNOSIS — G43.111 INTRACTABLE MIGRAINE WITH AURA WITH STATUS MIGRAINOSUS: ICD-10-CM

## 2018-10-16 DIAGNOSIS — K58.9 IRRITABLE BOWEL SYNDROME WITHOUT DIARRHEA: ICD-10-CM

## 2018-10-16 DIAGNOSIS — M17.0 PRIMARY OSTEOARTHRITIS OF BOTH KNEES: ICD-10-CM

## 2018-10-16 DIAGNOSIS — J30.9 ALLERGIC RHINITIS, UNSPECIFIED SEASONALITY, UNSPECIFIED TRIGGER: ICD-10-CM

## 2018-10-16 DIAGNOSIS — Z23 NEED FOR IMMUNIZATION AGAINST INFLUENZA: ICD-10-CM

## 2018-10-16 DIAGNOSIS — E78.5 HYPERLIPIDEMIA WITH TARGET LDL LESS THAN 130: ICD-10-CM

## 2018-10-16 DIAGNOSIS — I10 ESSENTIAL HYPERTENSION: ICD-10-CM

## 2018-10-16 PROCEDURE — 90682 RIV4 VACC RECOMBINANT DNA IM: CPT | Performed by: FAMILY MEDICINE

## 2018-10-16 PROCEDURE — 99396 PREV VISIT EST AGE 40-64: CPT | Performed by: FAMILY MEDICINE

## 2018-10-16 PROCEDURE — 90471 IMMUNIZATION ADMIN: CPT | Performed by: FAMILY MEDICINE

## 2018-10-16 RX ORDER — PANTOPRAZOLE SODIUM 40 MG/1
40 TABLET, DELAYED RELEASE ORAL 2 TIMES DAILY
Qty: 180 TABLET | Refills: 3 | Status: SHIPPED | OUTPATIENT
Start: 2018-10-16 | End: 2018-11-15 | Stop reason: SDUPTHER

## 2018-10-16 RX ORDER — BUTALBITAL, ASPIRIN, AND CAFFEINE 325; 50; 40 MG/1; MG/1; MG/1
CAPSULE ORAL
Qty: 40 CAPSULE | Refills: 0 | Status: SHIPPED | OUTPATIENT
Start: 2018-10-16 | End: 2018-12-18 | Stop reason: SDUPTHER

## 2018-10-16 ASSESSMENT — PATIENT HEALTH QUESTIONNAIRE - PHQ9
SUM OF ALL RESPONSES TO PHQ9 QUESTIONS 1 & 2: 0
1. LITTLE INTEREST OR PLEASURE IN DOING THINGS: 0
2. FEELING DOWN, DEPRESSED OR HOPELESS: 0
SUM OF ALL RESPONSES TO PHQ QUESTIONS 1-9: 0
SUM OF ALL RESPONSES TO PHQ QUESTIONS 1-9: 0

## 2018-10-16 NOTE — PROGRESS NOTES
inhaler Inhale 2 puffs into the lungs every 4 hours as needed for Wheezing May substitute for insurance preferred (Ventolin, Proventil, ProAir) Yes Genesis Becerra MD   fluticasone (FLONASE) 50 MCG/ACT nasal spray 1 spray by Nasal route daily. Yes Genesis Becerra MD   fexofenadine (ALLEGRA) 180 MG tablet Take 1 tablet by mouth daily. Yes Genesis Becerra MD   VITAMIN D, CHOLECALCIFEROL, PO Take 5,000 Units by mouth daily  Yes Kami Fields MD     Past Surgical History:   Procedure Laterality Date   [facility],     CHOLECYSTECTOMY  12-    lap hans    HYSTERECTOMY, VAGINAL  1994    endomet.  WISDOM TOOTH EXTRACTION       Social History     Social History    Marital status:      Spouse name: N/A    Number of children: N/A    Years of education: N/A     Occupational History    Not on file.      Social History Main Topics    Smoking status: Never Smoker    Smokeless tobacco: Never Used    Alcohol use 0.0 oz/week      Comment: rare    Drug use: No    Sexual activity: Yes     Partners: Male      Comment:      Other Topics Concern    Not on file     Social History Narrative    Exercise: walking three times a week    Seatbelt use: Always    Living will: no,   additional information provided     Family History   Problem Relation Age of Onset    High Blood Pressure Mother     Cancer Mother         BREAST    Substance Abuse Maternal Grandmother         ALCOHOL    Heart Disease Maternal Grandmother     High Blood Pressure Maternal Grandfather     Early Death Father 35        accidental overdose     Cholesterol, Total   Date Value Ref Range Status   06/20/2017 184 0 - 199 mg/dL Final     Lab Results   Component Value Date    LDLCALC 108 (H) 06/20/2017     HDL   Date Value Ref Range Status   06/20/2017 56 40 - 60 mg/dL Final     Triglycerides   Date Value Ref Range Status   06/20/2017 99 0 - 150 mg/dL Final     Lab Results   Component Value Date    GLUCOSE 96 02/09/2018 above). Objective:   PHYSICAL EXAM   /80 (Site: Right Upper Arm, Position: Sitting, Cuff Size: Medium Adult)   Pulse 68   Resp 12   Ht 5' 3\" (1.6 m)   Wt 170 lb (77.1 kg)   BMI 30.11 kg/m²   Blood pressure is Excellent. BP Readings from Last 5 Encounters:   10/16/18 128/80   03/16/18 130/80   02/09/18 136/80   12/15/17 132/80   10/27/17 128/80     Weight is decreased. Wt Readings from Last 5 Encounters:   10/16/18 170 lb (77.1 kg)   03/16/18 175 lb (79.4 kg)   02/09/18 171 lb (77.6 kg)   12/15/17 170 lb (77.1 kg)   10/27/17 167 lb (75.8 kg)      GENERAL:   · well-developed, well-nourished, alert, no distress. EYES:   · External findings: lids and lashes normal and conjunctivae and sclerae normal  · Eyes: no periorbital cellulitis. ENT:   · External nose and ears appear normal  · normal TM's and external ear canals both ears  · Pharynx: normal. Exudates: None  · Lips, mucosa, and tongue normal  · Hearing grossly normal.     NECK:   · No adenopathy, supple, symmetrical, trachea midline  · Thyroid not enlarged, symmetric, no tenderness/mass/nodules  LYMPH:  · no cervical nodes, no supraclavicular nodes  LUNGS:    · Breathing unlabored  · clear to auscultation bilaterally and good air movement  CARDIOVASC:   · regular rate and rhythm, S1, S2 normal. No murmur, click, rub or gallop  · Apical impulse normal  · LEGS:  Lower extremity edema: none    · No carotid bruits  ABDOMEN:   · Soft, non-tender, no masses  · No hepatosplenomegaly  · No hernias noted. Exam limited by N/A  SKIN: warm and dry  · No rashes or suspicious lesions  · No nodules or induration  PSYCH:    · Alert and oriented  · Normal reasoning, insight good  · Facial expressions full, mood appropriate  · No memory disturbance noted  MUSCULOSKEL:    · Gait normal, assistive device: none  · No significant finger or nail findings  · Spine symmetric, no deformities, no kyphosis      Assessment and Plan:      Diagnosis Orders   1.  Well adult health check     2. Intractable migraine with aura with status migrainosus  Stable with current medications. butalbital-aspirin-caffeine (FIORINAL) -40 MG capsule   3. Essential hypertension  Good control. Current treatment plan is effective, continue same. Comprehensive Metabolic Panel   4. Acquired hypothyroidism  Assessment: last check showed current treatment plan is effective. No symptoms. Plan:  Lab orders and follow up as documented in EMR  TSH without Reflex   5. Mild intermittent asthma without complication  Assessment: reasonably well controlled, no significant medication side effects noted. Plan: current treatment plan is effective, no change in therapy. Orders and follow up as documented in EMR. 6. Hyperlipidemia with target LDL less than 130  Needs repeat evaluation. Comprehensive Metabolic Panel    Lipid Panel   7. Vitamin D deficiency  Stable. Continue to monitor. Vitamin D 25 Hydroxy   8. Irritable bowel syndrome without diarrhea  Stable with current medications. 9. Gastroesophageal reflux disease without esophagitis  Worse. Double PPI. pantoprazole (PROTONIX) 40 MG tablet   10. Allergic rhinitis, unspecified seasonality, unspecified trigger  Manageable with meds. 11. Primary osteoarthritis of both knees  Needs to lose weight per otho   Previous labs reviewed as above. RISK FACTORS AND COUNSELLING  Behavioral Risks- Inadequate physical activity. Counseling provided on the following healthy behaviors: exercise. INSTRUCTIONS  · NEXT APPOINTMENT: Please schedule check-up in 6 months. · PLEASE TAKE THIS FORM TO CHECK-OUT WINDOW TO SCHEDULE NEXT VISIT. PLEASE GET FASTING BLOODWORK DRAWN SOON. Lab is on first floor in [address]. Hours Monday to Friday 7 AM to 5 PM.  Take orders with you. · RESULTS- most blood tests back in couple days. We will call you if any problems.   If bloodwork good, you will get letter in mail or notified thru [address] (if signed up) within 2

## 2018-10-30 DIAGNOSIS — K58.9 IBS (IRRITABLE BOWEL SYNDROME): ICD-10-CM

## 2018-10-30 RX ORDER — LEVOTHYROXINE SODIUM 88 UG/1
TABLET ORAL
Qty: 90 TABLET | Refills: 0 | Status: SHIPPED | OUTPATIENT
Start: 2018-10-30 | End: 2018-11-15

## 2018-10-30 RX ORDER — DICYCLOMINE HCL 20 MG
TABLET ORAL
Qty: 90 TABLET | Refills: 0 | Status: SHIPPED | OUTPATIENT
Start: 2018-10-30 | End: 2018-11-29 | Stop reason: SDUPTHER

## 2018-10-30 RX ORDER — METOPROLOL SUCCINATE 25 MG/1
25 TABLET, EXTENDED RELEASE ORAL DAILY
Qty: 90 TABLET | Refills: 0 | Status: SHIPPED | OUTPATIENT
Start: 2018-10-30 | End: 2019-01-26 | Stop reason: SDUPTHER

## 2018-11-03 DIAGNOSIS — E78.5 HYPERLIPIDEMIA WITH TARGET LDL LESS THAN 130: ICD-10-CM

## 2018-11-03 DIAGNOSIS — E03.9 ACQUIRED HYPOTHYROIDISM: ICD-10-CM

## 2018-11-03 DIAGNOSIS — I10 ESSENTIAL HYPERTENSION: ICD-10-CM

## 2018-11-03 DIAGNOSIS — E55.9 VITAMIN D DEFICIENCY: ICD-10-CM

## 2018-11-03 LAB
A/G RATIO: 1.5 (ref 1.1–2.2)
ALBUMIN SERPL-MCNC: 3.9 G/DL (ref 3.4–5)
ALP BLD-CCNC: 69 U/L (ref 40–129)
ALT SERPL-CCNC: 6 U/L (ref 10–40)
ANION GAP SERPL CALCULATED.3IONS-SCNC: 15 MMOL/L (ref 3–16)
AST SERPL-CCNC: 11 U/L (ref 15–37)
BILIRUB SERPL-MCNC: 0.3 MG/DL (ref 0–1)
BUN BLDV-MCNC: 11 MG/DL (ref 7–20)
CALCIUM SERPL-MCNC: 9.3 MG/DL (ref 8.3–10.6)
CHLORIDE BLD-SCNC: 102 MMOL/L (ref 99–110)
CHOLESTEROL, TOTAL: 231 MG/DL (ref 0–199)
CO2: 22 MMOL/L (ref 21–32)
CREAT SERPL-MCNC: 0.9 MG/DL (ref 0.6–1.1)
GFR AFRICAN AMERICAN: >60
GFR NON-AFRICAN AMERICAN: >60
GLOBULIN: 2.6 G/DL
GLUCOSE BLD-MCNC: 94 MG/DL (ref 70–99)
HDLC SERPL-MCNC: 68 MG/DL (ref 40–60)
LDL CHOLESTEROL CALCULATED: 141 MG/DL
POTASSIUM SERPL-SCNC: 4.3 MMOL/L (ref 3.5–5.1)
SODIUM BLD-SCNC: 139 MMOL/L (ref 136–145)
TOTAL PROTEIN: 6.5 G/DL (ref 6.4–8.2)
TRIGL SERPL-MCNC: 111 MG/DL (ref 0–150)
TSH SERPL DL<=0.05 MIU/L-ACNC: 4.11 UIU/ML (ref 0.27–4.2)
VITAMIN D 25-HYDROXY: 78.9 NG/ML
VLDLC SERPL CALC-MCNC: 22 MG/DL

## 2018-11-15 ENCOUNTER — TELEPHONE (OUTPATIENT)
Dept: FAMILY MEDICINE CLINIC | Age: 53
End: 2018-11-15

## 2018-11-15 DIAGNOSIS — K21.9 GASTROESOPHAGEAL REFLUX DISEASE WITHOUT ESOPHAGITIS: ICD-10-CM

## 2018-11-15 RX ORDER — PANTOPRAZOLE SODIUM 40 MG/1
40 TABLET, DELAYED RELEASE ORAL 2 TIMES DAILY
Qty: 180 TABLET | Refills: 3 | Status: SHIPPED | OUTPATIENT
Start: 2018-11-15 | End: 2019-03-21 | Stop reason: SDUPTHER

## 2018-11-15 RX ORDER — LEVOTHYROXINE SODIUM 0.1 MG/1
100 TABLET ORAL DAILY
Qty: 90 TABLET | Refills: 1 | Status: SHIPPED | OUTPATIENT
Start: 2018-11-15 | End: 2019-05-03 | Stop reason: SDUPTHER

## 2018-11-29 DIAGNOSIS — K58.9 IBS (IRRITABLE BOWEL SYNDROME): ICD-10-CM

## 2018-11-30 RX ORDER — DICYCLOMINE HCL 20 MG
TABLET ORAL
Qty: 90 TABLET | Refills: 1 | Status: SHIPPED | OUTPATIENT
Start: 2018-11-30 | End: 2019-03-20 | Stop reason: SDUPTHER

## 2018-12-18 DIAGNOSIS — G43.111 INTRACTABLE MIGRAINE WITH AURA WITH STATUS MIGRAINOSUS: ICD-10-CM

## 2018-12-19 RX ORDER — MONTELUKAST SODIUM 10 MG/1
TABLET ORAL
Qty: 90 TABLET | Refills: 0 | Status: SHIPPED | OUTPATIENT
Start: 2018-12-19 | End: 2019-03-18 | Stop reason: SDUPTHER

## 2018-12-19 RX ORDER — BUTALBITAL, ASPIRIN, AND CAFFEINE 325; 50; 40 MG/1; MG/1; MG/1
CAPSULE ORAL
Qty: 40 CAPSULE | Refills: 2 | Status: SHIPPED | OUTPATIENT
Start: 2018-12-19 | End: 2019-04-05 | Stop reason: SDUPTHER

## 2019-01-28 RX ORDER — METOPROLOL SUCCINATE 25 MG/1
25 TABLET, EXTENDED RELEASE ORAL DAILY
Qty: 90 TABLET | Refills: 0 | Status: SHIPPED | OUTPATIENT
Start: 2019-01-28 | End: 2019-05-04 | Stop reason: SDUPTHER

## 2019-02-06 ENCOUNTER — TELEPHONE (OUTPATIENT)
Dept: FAMILY MEDICINE CLINIC | Age: 54
End: 2019-02-06

## 2019-02-08 ENCOUNTER — TELEPHONE (OUTPATIENT)
Dept: FAMILY MEDICINE CLINIC | Age: 54
End: 2019-02-08

## 2019-02-08 ENCOUNTER — OFFICE VISIT (OUTPATIENT)
Dept: FAMILY MEDICINE CLINIC | Age: 54
End: 2019-02-08
Payer: COMMERCIAL

## 2019-02-08 VITALS
SYSTOLIC BLOOD PRESSURE: 122 MMHG | RESPIRATION RATE: 15 BRPM | HEART RATE: 90 BPM | WEIGHT: 170.6 LBS | DIASTOLIC BLOOD PRESSURE: 84 MMHG | OXYGEN SATURATION: 97 % | HEIGHT: 63 IN | TEMPERATURE: 97.4 F | BODY MASS INDEX: 30.23 KG/M2

## 2019-02-08 DIAGNOSIS — J06.9 VIRAL URI WITH COUGH: Primary | ICD-10-CM

## 2019-02-08 PROCEDURE — 99213 OFFICE O/P EST LOW 20 MIN: CPT | Performed by: FAMILY MEDICINE

## 2019-02-08 RX ORDER — DEXTROMETHORPHAN HYDROBROMIDE AND PROMETHAZINE HYDROCHLORIDE 15; 6.25 MG/5ML; MG/5ML
5 SYRUP ORAL 4 TIMES DAILY PRN
Qty: 118 ML | Refills: 0 | Status: SHIPPED | OUTPATIENT
Start: 2019-02-08 | End: 2019-03-12 | Stop reason: SDUPTHER

## 2019-02-08 ASSESSMENT — PATIENT HEALTH QUESTIONNAIRE - PHQ9
SUM OF ALL RESPONSES TO PHQ9 QUESTIONS 1 & 2: 0
SUM OF ALL RESPONSES TO PHQ QUESTIONS 1-9: 0
1. LITTLE INTEREST OR PLEASURE IN DOING THINGS: 0
2. FEELING DOWN, DEPRESSED OR HOPELESS: 0
SUM OF ALL RESPONSES TO PHQ QUESTIONS 1-9: 0

## 2019-02-11 ASSESSMENT — ENCOUNTER SYMPTOMS
SINUS PRESSURE: 1
SHORTNESS OF BREATH: 0
COUGH: 1
WHEEZING: 0

## 2019-03-12 ENCOUNTER — TELEPHONE (OUTPATIENT)
Dept: FAMILY MEDICINE CLINIC | Age: 54
End: 2019-03-12

## 2019-03-12 DIAGNOSIS — J06.9 VIRAL URI WITH COUGH: ICD-10-CM

## 2019-03-12 RX ORDER — DEXTROMETHORPHAN HYDROBROMIDE AND PROMETHAZINE HYDROCHLORIDE 15; 6.25 MG/5ML; MG/5ML
5 SYRUP ORAL 4 TIMES DAILY PRN
Qty: 118 ML | Refills: 0 | Status: SHIPPED | OUTPATIENT
Start: 2019-03-12 | End: 2019-12-23 | Stop reason: SDUPTHER

## 2019-03-12 NOTE — TELEPHONE ENCOUNTER
Pt was seen 2.8.2019 for a cough  Cant sleep, cant carry a converstation without coughing  She was put on cough syrup (Promethazine DM 6.25-15mg)  She would like a refill on this syrup because she is almost our of it  Pharm confirmed- [facility] ([facility]) [facility]  Please advise

## 2019-03-13 ENCOUNTER — TELEPHONE (OUTPATIENT)
Dept: FAMILY MEDICINE CLINIC | Age: 54
End: 2019-03-13

## 2019-03-14 ENCOUNTER — OFFICE VISIT (OUTPATIENT)
Dept: FAMILY MEDICINE CLINIC | Age: 54
End: 2019-03-14
Payer: COMMERCIAL

## 2019-03-14 VITALS
OXYGEN SATURATION: 98 % | SYSTOLIC BLOOD PRESSURE: 140 MMHG | HEART RATE: 88 BPM | BODY MASS INDEX: 29.77 KG/M2 | RESPIRATION RATE: 16 BRPM | DIASTOLIC BLOOD PRESSURE: 92 MMHG | HEIGHT: 63 IN | WEIGHT: 168 LBS

## 2019-03-14 DIAGNOSIS — J32.9 CHRONIC SINUSITIS, UNSPECIFIED LOCATION: ICD-10-CM

## 2019-03-14 DIAGNOSIS — J45.20 MILD INTERMITTENT ASTHMA, UNSPECIFIED WHETHER COMPLICATED: ICD-10-CM

## 2019-03-14 DIAGNOSIS — J30.9 ALLERGIC RHINITIS, UNSPECIFIED SEASONALITY, UNSPECIFIED TRIGGER: Primary | ICD-10-CM

## 2019-03-14 PROCEDURE — 99213 OFFICE O/P EST LOW 20 MIN: CPT | Performed by: FAMILY MEDICINE

## 2019-03-14 RX ORDER — BENZONATATE 100 MG/1
100-200 CAPSULE ORAL 3 TIMES DAILY PRN
Qty: 30 CAPSULE | Refills: 0 | Status: SHIPPED | OUTPATIENT
Start: 2019-03-14 | End: 2019-03-21

## 2019-03-14 RX ORDER — PREDNISONE 1 MG/1
5 TABLET ORAL 2 TIMES DAILY
Qty: 10 TABLET | Refills: 0 | Status: SHIPPED | OUTPATIENT
Start: 2019-03-14 | End: 2019-03-19

## 2019-03-18 RX ORDER — MONTELUKAST SODIUM 10 MG/1
TABLET ORAL
Qty: 90 TABLET | Refills: 1 | Status: SHIPPED | OUTPATIENT
Start: 2019-03-18 | End: 2019-10-19 | Stop reason: SDUPTHER

## 2019-03-20 DIAGNOSIS — K58.9 IBS (IRRITABLE BOWEL SYNDROME): ICD-10-CM

## 2019-03-20 DIAGNOSIS — K21.9 GASTROESOPHAGEAL REFLUX DISEASE WITHOUT ESOPHAGITIS: ICD-10-CM

## 2019-03-21 RX ORDER — PANTOPRAZOLE SODIUM 40 MG/1
40 TABLET, DELAYED RELEASE ORAL 2 TIMES DAILY
Qty: 180 TABLET | Refills: 3 | Status: SHIPPED | OUTPATIENT
Start: 2019-03-21 | End: 2020-04-08

## 2019-03-21 RX ORDER — DICYCLOMINE HCL 20 MG
TABLET ORAL
Qty: 90 TABLET | Refills: 2 | Status: SHIPPED | OUTPATIENT
Start: 2019-03-21 | End: 2019-03-26 | Stop reason: SDUPTHER

## 2019-03-22 ENCOUNTER — TELEPHONE (OUTPATIENT)
Dept: FAMILY MEDICINE CLINIC | Age: 54
End: 2019-03-22

## 2019-03-26 ENCOUNTER — TELEPHONE (OUTPATIENT)
Dept: FAMILY MEDICINE CLINIC | Age: 54
End: 2019-03-26

## 2019-03-26 DIAGNOSIS — K58.9 IBS (IRRITABLE BOWEL SYNDROME): ICD-10-CM

## 2019-03-26 RX ORDER — DICYCLOMINE HCL 20 MG
TABLET ORAL
Qty: 270 TABLET | Refills: 1 | Status: SHIPPED | OUTPATIENT
Start: 2019-03-26 | End: 2020-04-22

## 2019-03-28 DIAGNOSIS — G43.111 INTRACTABLE MIGRAINE WITH AURA WITH STATUS MIGRAINOSUS: ICD-10-CM

## 2019-03-29 RX ORDER — SUMATRIPTAN 20 MG/1
SPRAY NASAL
Qty: 1 EACH | Refills: 5 | Status: SHIPPED | OUTPATIENT
Start: 2019-03-29 | End: 2022-05-04 | Stop reason: SDUPTHER

## 2019-04-05 DIAGNOSIS — G43.111 INTRACTABLE MIGRAINE WITH AURA WITH STATUS MIGRAINOSUS: ICD-10-CM

## 2019-04-05 RX ORDER — BUTALBITAL, ASPIRIN, AND CAFFEINE 325; 50; 40 MG/1; MG/1; MG/1
CAPSULE ORAL
Qty: 40 CAPSULE | Refills: 0 | Status: SHIPPED | OUTPATIENT
Start: 2019-04-05 | End: 2019-05-04 | Stop reason: SDUPTHER

## 2019-05-03 RX ORDER — LEVOTHYROXINE SODIUM 0.1 MG/1
100 TABLET ORAL DAILY
Qty: 90 TABLET | Refills: 1 | Status: SHIPPED | OUTPATIENT
Start: 2019-05-03 | End: 2019-10-19 | Stop reason: SDUPTHER

## 2019-05-20 ENCOUNTER — TELEPHONE (OUTPATIENT)
Dept: FAMILY MEDICINE CLINIC | Age: 54
End: 2019-05-20

## 2019-05-20 NOTE — TELEPHONE ENCOUNTER
Pt would like to see dr Duncan Elaine on Wednesday for her ibs and shoulder.   Is there a time I can get her in '      Please advise

## 2019-05-20 NOTE — TELEPHONE ENCOUNTER
Spoke with pt. Regarding message above. She stated she will skip her missed dose and take regular dose at night.

## 2019-05-20 NOTE — TELEPHONE ENCOUNTER
Pt forgot to take her BP med last night and wants to know if she can take her med this morning. She has a really bad headache at the moment.     Please advise    Thanks

## 2019-05-20 NOTE — TELEPHONE ENCOUNTER
Typically would not want to take 2 doses in the same day. Would need to know what her BP is before advising if she should take an extra dose today. Since she missed her dose last night, she should take her regular dose today.

## 2019-05-22 ENCOUNTER — TELEPHONE (OUTPATIENT)
Dept: FAMILY MEDICINE CLINIC | Age: 54
End: 2019-05-22

## 2019-05-22 ENCOUNTER — OFFICE VISIT (OUTPATIENT)
Dept: FAMILY MEDICINE CLINIC | Age: 54
End: 2019-05-22
Payer: COMMERCIAL

## 2019-05-22 VITALS
HEART RATE: 64 BPM | SYSTOLIC BLOOD PRESSURE: 118 MMHG | HEIGHT: 63 IN | WEIGHT: 165 LBS | DIASTOLIC BLOOD PRESSURE: 80 MMHG | BODY MASS INDEX: 29.23 KG/M2

## 2019-05-22 DIAGNOSIS — R10.13 EPIGASTRIC PAIN: Primary | ICD-10-CM

## 2019-05-22 DIAGNOSIS — S46.911A STRAIN OF RIGHT SHOULDER, INITIAL ENCOUNTER: ICD-10-CM

## 2019-05-22 DIAGNOSIS — K58.2 IRRITABLE BOWEL SYNDROME WITH BOTH CONSTIPATION AND DIARRHEA: ICD-10-CM

## 2019-05-22 PROCEDURE — 99214 OFFICE O/P EST MOD 30 MIN: CPT | Performed by: FAMILY MEDICINE

## 2019-05-22 RX ORDER — LUBIPROSTONE 8 UG/1
8 CAPSULE, GELATIN COATED ORAL DAILY
Qty: 30 CAPSULE | Refills: 3 | Status: SHIPPED | OUTPATIENT
Start: 2019-05-22 | End: 2019-12-23

## 2019-05-22 NOTE — PATIENT INSTRUCTIONS
INSTRUCTIONS  NEXT APPOINTMENT: Please schedule annual complete physical (30 minutes) in 6 months. · PLEASE TAKE THIS FORM TO CHECK-OUT WINDOW TO SCHEDULE NEXT VISIT. · PLEASE GET BLOODWORK DRAWN TODAY ON FIRST FLOOR in 170. Take orders with you. RESULTS- most blood tests back in couple days. We will call you if any problems. If bloodwork good, you will get letter in mail or notified thru [address] (if signed up) within 2 weeks. If you do not, please call office. · INCREASE fiber to twice a day   · Start Amitiza at 8 mcg. May increase to 24 in a month. · See GI about pain. Suspect need scope and ERCP  · Use heat 20 minutes on painful joint/muscle. Then do stretches. May ice any sore spots or for swelling afterwards. Patient Education       ROTATOR CUFF INJURY     What is a rotator cuff injury? A rotator cuff injury is a strain or tear in the group of tendons and muscles that hold your shoulder joint together and help move your shoulder. How does it occur? A rotator cuff injury may result from:   using your arm to break a fall   falling onto your arm   lifting a heavy object   use of your shoulder in sports with a repetitive overhead movement, such as swimming, baseball (mainly pitchers), football, and tennis, which gradually strains the tendon   manual labor such as painting, plastering, raking leaves, or housework. What are the symptoms? The symptoms of a torn rotator cuff are:   arm and shoulder pain   shoulder weakness   shoulder tenderness   loss of shoulder movement, especially overhead. How is it diagnosed? Your doctor will perform a physical exam and check your shoulder for pain, tenderness, and loss of motion as you move your arm in all directions. Your doctor also will ask whether your shoulder pain began suddenly or gradually. An x-ray may be done to rule out fractures and bone spurs.    Based on these results, your doctor may order other tests and procedures either right away or later, including:   magnetic resonance imaging (MRI), which creates images of your shoulder and surrounding structures with sound waves   an arthrogram, which is an x-ray or MRI that is taken after a special dye has been injected into your shoulder joint to outline its soft structures   arthroscopy, a surgical procedure in which a small instrument is inserted into your shoulder joint so your doctor can look directly at your rotator cuff. What is the treatment? A tendon in your shoulder can be inflamed, partially torn, or completely torn. What is done about it depends on how torn it is and how much it hurts. If your tear is a minor one, it can be left to heal by itself if it doesn't interfere with your everyday activities. Your treatment plan should include:   proper sitting posture, in which your head and shoulders are balanced   rest for your shoulder, which means avoiding strenuous activity and any overhead motion that causes pain   ice packs at least once a day, and preferably two or three times a day   doing the exercises your doctor gives you   anti-inflammatory drugs   physical therapy to strengthen your shoulder as it heals. If you have a bad tear, you may need to have it repaired by arthroscopy. Arthroscopy is also used to perform surgery on a joint, not only for seeing its interior. The rough edges of a torn tendon can be trimmed and left to heal. Larger tears can be stitched back together. After surgery, your treatment plan will include physical therapy to strengthen your shoulder as it heals. How long will the effects of a torn rotator cuff last?   Full recovery depends on what is torn and how it is treated. When can I return to my sport or activity? The goal of rehabilitation is to return you to your sport or activity as soon as is safely possible. If you return too soon you may worsen your injury, which could lead to permanent damage.  Everyone recovers from injury at a different rate. Return to your sport or activity will be determined by how soon your shoulder recovers, not by how many days or weeks it has been since your injury occurred. In general, the longer you have symptoms before you start treatment, the longer it takes to get better. You may safely return to your sport or activity when: Your injured shoulder has full range of motion without pain. Your injured shoulder has regained normal strength compared to the uninjured shoulder. In throwing sports, you must gradually build your tolerance to throwing. This means you should start with gentle tossing and gradually throw harder. In contact sports, your shoulder must not be tender to touch and contact should progress from minimal contact to harder contact. What can be done to help prevent this from recurring? The best way to prevent a recurrence is to strengthen your shoulder muscles and keep them in peak condition with shoulder exercises. Rotator Cuff Strain Rehabilitation Exercises   You may do all of these exercises right away. Scapular range of motion: Stand and shrug your shoulders up and hold for 5 seconds. Then squeeze your shoulder blades back and together and hold 5 seconds. Next, pull your shoulder blades downward as if putting them in your back pocket. Relax. Repeat this sequence 10 times. Wand exercises   1. Flexion: Stand upright and hold a stick in both hands, palms down. Stretch your arms by lifting them over your head, keeping your elbows straight. Hold for 5 seconds and return to the starting position. Repeat 10 times. 2. Extension: Stand upright and hold a stick in both hands behind your back. Move the stick away from your back. Hold the end position for 5 seconds. Relax and return to the starting position. Repeat 10 times. 3. External rotation: Lie on your back and hold a stick in both hands, palms up.  Your upper arms should be resting on the floor, your elbows at your sides and bent 90°. Using your good arm, push your injured arm out away from your body while keeping the elbow of the injured arm at your side. Hold the stretch for 5 seconds. Repeat 10 times. Isometrics   1. External rotation: Standing in a doorway with your elbow bent 90° and the back of your hand pressing against the door frame, attempt to press your hand outward into the door frame. Hold for 5 seconds. Do 3 sets of 10.   2. Internal rotation: Standing in a doorway with your elbow bent 90° and the front of your hand pressing against the door frame, attempt to press your palm into the door frame. Hold for 5 seconds. Do 3 sets of 10. Tubing exercise for external rotation: Stand resting the hand of your injured side against your stomach. With that hand grasp tubing that is connected to a doorknob or other object at waist level. Keeping your elbow in at your side, rotate your arm outward and away from your waist. Make sure you keep your elbow bent 90 degrees and your forearm parallel to the floor. Repeat 10 times. Build up to 3 sets of 10. Supraspinatus exercise: Standing with your arms at your sides and your thumbs pointed toward the floor. Lift your arms up and out from your sides, keeping your elbows straight. Lift your hands only to shoulder level. Hold 5 seconds. Do 3 sets of 10. Gradually add weight to your hands to increase your strength.

## 2019-05-22 NOTE — PROGRESS NOTES
mg/dL  Prior to Visit Medications    Medication Sig Taking? Authorizing Provider   lubiprostone (AMITIZA) 8 MCG CAPS capsule Take 1 capsule by mouth daily Yes Jackson Rendon MD   butalbital-aspirin-caffeine (FIORINAL) -40 MG capsule TAKE 1 CAPSULE BY MOUTH EVERY 6 HOURS AS NEEDED FOR HEADACHES. MAX 40 PER MONTH Yes Jackson Rendon MD   metoprolol succinate (TOPROL XL) 25 MG extended release tablet TAKE 1 TABLET BY MOUTH DAILY Yes Jackson Rendon MD   levothyroxine (SYNTHROID) 100 MCG tablet TAKE 1 TABLET BY MOUTH DAILY Yes Jackson Rendon MD   SUMAtriptan (IMITREX) 20 MG/ACT nasal spray USE 1 SPRAY NASALLY DAILY AS NEEDED FOR MIGRAINE Yes Jackson Rendon MD   dicyclomine (BENTYL) 20 MG tablet TAKE 1 TABLET BY MOUTH THREE TIMES DAILY AS NEEDED Yes Jackson Rendon MD   pantoprazole (PROTONIX) 40 MG tablet Take 1 tablet by mouth 2 times daily Yes Jackson Rendon MD   montelukast (SINGULAIR) 10 MG tablet TAKE 1 TABLET BY MOUTH EVERY NIGHT AT BEDTIME Yes Hayde Armenta MD   topiramate (TOPAMAX) 25 MG tablet TAKE 1 TO 2 TABLETS BY MOUTH EVERY NIGHT Yes Jackson Rendon MD   estradiol (ESTRACE) 1 MG tablet Take 1 tablet by mouth daily Yes Jackson Rendon MD   Blood Pressure Monitor KIT Daily as needed Yes Jackson Rendon MD   calcium carbonate (OSCAL) 500 MG TABS tablet Take 500 mg by mouth daily Yes Historical Provider, MD   albuterol (PROVENTIL HFA;VENTOLIN HFA) 108 (90 BASE) MCG/ACT inhaler Inhale 2 puffs into the lungs every 4 hours as needed for Wheezing May substitute for insurance preferred (Ventolin, Proventil, ProAir) Yes Jackson Rendon MD   fluticasone (FLONASE) 50 MCG/ACT nasal spray 1 spray by Nasal route daily. Yes Jackson Rendon MD   fexofenadine (ALLEGRA) 180 MG tablet Take 1 tablet by mouth daily.  Yes Jackson Rendon MD   VITAMIN D, CHOLECALCIFEROL, PO Take 5,000 Units by mouth daily  Yes Historical Provider, MD      Family History   Problem Relation Age of Onset    High Blood Pressure Mother     Cancer Mother findings: lids and lashes normal and conjunctivae and sclerae normal  · Eyes: no periorbital cellulitis. ENT:   · External nose and ears appear normal  NECK:   · No adenopathy, supple, symmetrical, trachea midline  · Thyroid not enlarged, symmetric, no tenderness/mass/nodules  LYMPH:  · no cervical nodes, no supraclavicular nodes  LUNGS:    · Breathing unlabored  · clear to auscultation bilaterally and good air movement  CARDIOVASC:   · Regular rate and rhythm, S1, S2 normal. No murmur, click, rub or gallop  · LEGS:  Lower extremity edema: none    SKIN: warm and dry  PSYCH:    · Alert and oriented  · Normal reasoning, insight good  · Facial expressions full, mood appropriate  ABDOMEN:   · Soft, mod tender epigastric  · No hepatosplenomegaly  · No hernias noted. Exam limited by N/A  · Rectal:  No hemorrhoids, normal rectal tone, no masses  SHOULDER: right  Internal rotation: abnormal - decreased due to pain  External rotation: abnormal - slight decrease with pain  ABduction:  abnormal - due to pain but to 120  ADduction:  normal  Internal rotation (infraspinatus): negative  Tenderness: None  Distal neuromuscular exam negative  Radial pulse intact     Assessment and Plan:      Diagnosis Orders   1. Epigastric pain  External Referral To Gastroenterology   2. Irritable bowel syndrome with both constipation and diarrhea  lubiprostone (AMITIZA) 8 MCG CAPS capsule   3. Strain of right shoulder, initial encounter     worsened. Plan as above and below. INSTRUCTIONS  NEXT APPOINTMENT: Please schedule annual complete physical (30 minutes) in 6 months. · PLEASE TAKE THIS FORM TO CHECK-OUT WINDOW TO SCHEDULE NEXT VISIT. · PLEASE GET BLOODWORK DRAWN TODAY ON FIRST FLOOR in 170. Take orders with you. RESULTS- most blood tests back in couple days. We will call you if any problems. If bloodwork good, you will get letter in mail or notified thru [address] (if signed up) within 2 weeks. If you do not, please call office. · INCREASE fiber to twice a day   · Start Amitiza at 8 mcg. May increase to 24 in a month. · See GI about pain. Suspect need scope and ERCP  · Use heat 20 minutes on painful joint/muscle. Then do stretches. May ice any sore spots or for swelling afterwards.

## 2019-05-22 NOTE — TELEPHONE ENCOUNTER
Pt called said she went to  her lubiprostone for her ibs and pharmacy said they sent this back to dr Pearl Salmeron for prior auth and she was calling to make sure we got this

## 2019-07-26 RX ORDER — METOPROLOL SUCCINATE 25 MG/1
25 TABLET, EXTENDED RELEASE ORAL DAILY
Qty: 90 TABLET | Refills: 1 | Status: SHIPPED | OUTPATIENT
Start: 2019-07-26 | End: 2020-03-17

## 2019-08-13 DIAGNOSIS — G43.111 INTRACTABLE MIGRAINE WITH AURA WITH STATUS MIGRAINOSUS: ICD-10-CM

## 2019-08-13 RX ORDER — BUTALBITAL, ASPIRIN, AND CAFFEINE 325; 50; 40 MG/1; MG/1; MG/1
CAPSULE ORAL
Qty: 40 CAPSULE | Refills: 0 | Status: SHIPPED | OUTPATIENT
Start: 2019-08-13 | End: 2019-09-16 | Stop reason: SDUPTHER

## 2019-09-16 DIAGNOSIS — G43.111 INTRACTABLE MIGRAINE WITH AURA WITH STATUS MIGRAINOSUS: ICD-10-CM

## 2019-09-18 RX ORDER — BUTALBITAL, ASPIRIN, AND CAFFEINE 325; 50; 40 MG/1; MG/1; MG/1
CAPSULE ORAL
Qty: 40 CAPSULE | Refills: 0 | Status: SHIPPED | OUTPATIENT
Start: 2019-09-18 | End: 2019-10-19 | Stop reason: SDUPTHER

## 2019-10-19 DIAGNOSIS — G43.111 INTRACTABLE MIGRAINE WITH AURA WITH STATUS MIGRAINOSUS: ICD-10-CM

## 2019-10-21 RX ORDER — MONTELUKAST SODIUM 10 MG/1
TABLET ORAL
Qty: 90 TABLET | Refills: 0 | Status: SHIPPED | OUTPATIENT
Start: 2019-10-21 | End: 2020-01-16

## 2019-10-21 RX ORDER — BUTALBITAL, ASPIRIN, AND CAFFEINE 325; 50; 40 MG/1; MG/1; MG/1
CAPSULE ORAL
Qty: 90 CAPSULE | Refills: 0 | Status: SHIPPED | OUTPATIENT
Start: 2019-10-21 | End: 2019-12-23

## 2019-10-21 RX ORDER — LEVOTHYROXINE SODIUM 0.1 MG/1
100 TABLET ORAL DAILY
Qty: 90 TABLET | Refills: 0 | Status: SHIPPED | OUTPATIENT
Start: 2019-10-21 | End: 2020-01-16

## 2019-11-26 ENCOUNTER — TELEPHONE (OUTPATIENT)
Dept: FAMILY MEDICINE CLINIC | Age: 54
End: 2019-11-26

## 2019-11-26 DIAGNOSIS — R92.8 ABNORMAL MAMMOGRAM OF LEFT BREAST: Primary | ICD-10-CM

## 2019-12-23 ENCOUNTER — OFFICE VISIT (OUTPATIENT)
Dept: FAMILY MEDICINE CLINIC | Age: 54
End: 2019-12-23
Payer: COMMERCIAL

## 2019-12-23 VITALS
WEIGHT: 155 LBS | SYSTOLIC BLOOD PRESSURE: 134 MMHG | RESPIRATION RATE: 16 BRPM | HEART RATE: 82 BPM | DIASTOLIC BLOOD PRESSURE: 82 MMHG | HEIGHT: 63 IN | BODY MASS INDEX: 27.46 KG/M2

## 2019-12-23 DIAGNOSIS — I10 ESSENTIAL HYPERTENSION: ICD-10-CM

## 2019-12-23 DIAGNOSIS — E55.9 VITAMIN D DEFICIENCY: ICD-10-CM

## 2019-12-23 DIAGNOSIS — J06.9 VIRAL URI WITH COUGH: ICD-10-CM

## 2019-12-23 DIAGNOSIS — Z85.828 HX OF SKIN CANCER, BASAL CELL: ICD-10-CM

## 2019-12-23 DIAGNOSIS — E03.9 ACQUIRED HYPOTHYROIDISM: ICD-10-CM

## 2019-12-23 DIAGNOSIS — G43.111 INTRACTABLE MIGRAINE WITH AURA WITH STATUS MIGRAINOSUS: ICD-10-CM

## 2019-12-23 DIAGNOSIS — E78.5 HYPERLIPIDEMIA WITH TARGET LDL LESS THAN 130: ICD-10-CM

## 2019-12-23 DIAGNOSIS — Z00.00 WELL ADULT HEALTH CHECK: Primary | ICD-10-CM

## 2019-12-23 LAB
ANION GAP SERPL CALCULATED.3IONS-SCNC: 15 MMOL/L (ref 3–16)
BUN BLDV-MCNC: 15 MG/DL (ref 7–20)
CALCIUM SERPL-MCNC: 8.7 MG/DL (ref 8.3–10.6)
CHLORIDE BLD-SCNC: 99 MMOL/L (ref 99–110)
CO2: 22 MMOL/L (ref 21–32)
CREAT SERPL-MCNC: 0.8 MG/DL (ref 0.6–1.1)
GFR AFRICAN AMERICAN: >60
GFR NON-AFRICAN AMERICAN: >60
GLUCOSE BLD-MCNC: 94 MG/DL (ref 70–99)
POTASSIUM SERPL-SCNC: 4.4 MMOL/L (ref 3.5–5.1)
SODIUM BLD-SCNC: 136 MMOL/L (ref 136–145)
TSH SERPL DL<=0.05 MIU/L-ACNC: 3.15 UIU/ML (ref 0.27–4.2)
VITAMIN D 25-HYDROXY: 84.4 NG/ML

## 2019-12-23 PROCEDURE — 99396 PREV VISIT EST AGE 40-64: CPT | Performed by: FAMILY MEDICINE

## 2019-12-23 RX ORDER — DEXTROMETHORPHAN HYDROBROMIDE AND PROMETHAZINE HYDROCHLORIDE 15; 6.25 MG/5ML; MG/5ML
5 SYRUP ORAL 4 TIMES DAILY PRN
Qty: 118 ML | Refills: 0 | Status: SHIPPED | OUTPATIENT
Start: 2019-12-23 | End: 2019-12-30

## 2019-12-23 RX ORDER — VALACYCLOVIR HYDROCHLORIDE 500 MG/1
500 TABLET, FILM COATED ORAL
COMMUNITY
Start: 2014-06-19

## 2019-12-23 RX ORDER — BUTALBITAL, ASPIRIN, AND CAFFEINE 325; 50; 40 MG/1; MG/1; MG/1
CAPSULE ORAL
Qty: 90 CAPSULE | Refills: 1 | Status: SHIPPED | OUTPATIENT
Start: 2019-12-23 | End: 2020-05-08

## 2019-12-24 RX ORDER — BUTALBITAL, ASPIRIN, AND CAFFEINE 325; 50; 40 MG/1; MG/1; MG/1
CAPSULE ORAL
Qty: 90 CAPSULE | Refills: 0 | OUTPATIENT
Start: 2019-12-24 | End: 2020-03-23

## 2020-01-16 RX ORDER — LEVOTHYROXINE SODIUM 0.1 MG/1
100 TABLET ORAL DAILY
Qty: 90 TABLET | Refills: 0 | Status: SHIPPED | OUTPATIENT
Start: 2020-01-16 | End: 2020-04-08

## 2020-01-16 RX ORDER — MONTELUKAST SODIUM 10 MG/1
TABLET ORAL
Qty: 90 TABLET | Refills: 0 | Status: SHIPPED | OUTPATIENT
Start: 2020-01-16 | End: 2020-04-08

## 2020-03-17 RX ORDER — METOPROLOL SUCCINATE 25 MG/1
25 TABLET, EXTENDED RELEASE ORAL DAILY
Qty: 90 TABLET | Refills: 1 | Status: SHIPPED | OUTPATIENT
Start: 2020-03-17 | End: 2020-07-01 | Stop reason: SDUPTHER

## 2020-04-08 RX ORDER — LEVOTHYROXINE SODIUM 0.1 MG/1
100 TABLET ORAL DAILY
Qty: 90 TABLET | Refills: 0 | Status: SHIPPED | OUTPATIENT
Start: 2020-04-08 | End: 2020-07-01 | Stop reason: SDUPTHER

## 2020-04-08 RX ORDER — MONTELUKAST SODIUM 10 MG/1
TABLET ORAL
Qty: 90 TABLET | Refills: 0 | Status: SHIPPED | OUTPATIENT
Start: 2020-04-08 | End: 2020-07-01 | Stop reason: SDUPTHER

## 2020-04-08 RX ORDER — PANTOPRAZOLE SODIUM 40 MG/1
TABLET, DELAYED RELEASE ORAL
Qty: 180 TABLET | Refills: 1 | Status: SHIPPED | OUTPATIENT
Start: 2020-04-08 | End: 2021-07-19

## 2020-04-22 RX ORDER — DICYCLOMINE HCL 20 MG
TABLET ORAL
Qty: 90 TABLET | Refills: 1 | Status: SHIPPED | OUTPATIENT
Start: 2020-04-22 | End: 2020-07-01 | Stop reason: SDUPTHER

## 2020-05-04 ENCOUNTER — TELEPHONE (OUTPATIENT)
Dept: FAMILY MEDICINE CLINIC | Age: 55
End: 2020-05-04

## 2020-05-08 RX ORDER — BUTALBITAL, ASPIRIN, AND CAFFEINE 325; 50; 40 MG/1; MG/1; MG/1
CAPSULE ORAL
Qty: 90 CAPSULE | Refills: 0 | Status: SHIPPED | OUTPATIENT
Start: 2020-05-08 | End: 2020-07-01 | Stop reason: SDUPTHER

## 2020-05-26 ENCOUNTER — TELEPHONE (OUTPATIENT)
Dept: FAMILY MEDICINE CLINIC | Age: 55
End: 2020-05-26

## 2020-07-01 ENCOUNTER — VIRTUAL VISIT (OUTPATIENT)
Dept: FAMILY MEDICINE CLINIC | Age: 55
End: 2020-07-01
Payer: COMMERCIAL

## 2020-07-01 PROCEDURE — 99214 OFFICE O/P EST MOD 30 MIN: CPT | Performed by: FAMILY MEDICINE

## 2020-07-01 RX ORDER — METOPROLOL SUCCINATE 25 MG/1
25 TABLET, EXTENDED RELEASE ORAL DAILY
Qty: 90 TABLET | Refills: 1 | Status: SHIPPED | OUTPATIENT
Start: 2020-07-01 | End: 2021-03-08

## 2020-07-01 RX ORDER — LEVOTHYROXINE SODIUM 0.1 MG/1
100 TABLET ORAL DAILY
Qty: 90 TABLET | Refills: 0 | Status: SHIPPED | OUTPATIENT
Start: 2020-07-01 | End: 2020-10-02

## 2020-07-01 RX ORDER — MONTELUKAST SODIUM 10 MG/1
TABLET ORAL
Qty: 90 TABLET | Refills: 0 | Status: SHIPPED | OUTPATIENT
Start: 2020-07-01 | End: 2020-10-02

## 2020-07-01 RX ORDER — DICYCLOMINE HCL 20 MG
TABLET ORAL
Qty: 90 TABLET | Refills: 1 | Status: SHIPPED | OUTPATIENT
Start: 2020-07-01 | End: 2020-12-30

## 2020-07-01 RX ORDER — ESTRADIOL 1 MG/1
1 TABLET ORAL DAILY
Qty: 90 TABLET | Refills: 3 | Status: SHIPPED | OUTPATIENT
Start: 2020-07-01 | End: 2020-12-04

## 2020-07-01 RX ORDER — BUTALBITAL, ASPIRIN, AND CAFFEINE 325; 50; 40 MG/1; MG/1; MG/1
CAPSULE ORAL
Qty: 90 CAPSULE | Refills: 0 | Status: SHIPPED | OUTPATIENT
Start: 2020-07-01 | End: 2020-10-21

## 2020-07-01 ASSESSMENT — PATIENT HEALTH QUESTIONNAIRE - PHQ9
SUM OF ALL RESPONSES TO PHQ QUESTIONS 1-9: 0
SUM OF ALL RESPONSES TO PHQ9 QUESTIONS 1 & 2: 0
2. FEELING DOWN, DEPRESSED OR HOPELESS: 0
SUM OF ALL RESPONSES TO PHQ QUESTIONS 1-9: 0
1. LITTLE INTEREST OR PLEASURE IN DOING THINGS: 0

## 2020-07-01 NOTE — PROGRESS NOTES
TELEHEALTH EVALUATION -- Audio/Visual (During DPXGO-38 public health emergency)  VIDEO VISIT- patient and provider not at same location  Also present:none. CARDIOVASCULAR VISIT NOTE   Subjective:   HPI CHRONIC:   Chief Complaint   Patient presents with    Hypertension      Patient here for follow-up of multiple chronic conditions includin. Acquired hypothyroidism    2. Intractable migraine with aura with status migrainosus    3. IBS (irritable bowel syndrome)    4. Menopausal syndrome    5. Essential hypertension    6. Allergic rhinitis, unspecified seasonality, unspecified trigger      Complaints: pt is doing well no new issues she said same old issues     Has a soft hemorrhoid. No pain. Sees black dots after bending over and getting up fast. Drinks lots water. · Following appropriate diet? Yes  · Exercise: walking daily  · Taking medicines daily as directed? Yes  · Any side effects of medications? Yes pt says she is having stomach issues     129/80, 134/79    Review of Systems   General ROS: fever? No,    Night sweats? Yes  Endocrine ROS: fatigue? No   Unexpected weight changes? No  Hem/Lymph ROS: easy bruising? No   Swollen lymph nodes? No  Respiratory ROS: cough? No   Wheezing? No  Cardiovascular ROS: chest pain? No   Shortness of breath? No  Neurological ROS: TIA or stroke symptoms?  No    Health Maintenance Due   Topic Date Due    Shingles Vaccine (1 of 2) 2013       *Chief complaint, HPI, History and ROS provided by the medical assistant has been reviewed and verified by provider- Caryl Valadez MD    CHART REVIEW  Health Maintenance   Topic Date Due    Shingles Vaccine (1 of 2) 2013    Flu vaccine (1) 2020    TSH testing  2020    Potassium monitoring  2020    Creatinine monitoring  2020    Breast cancer screen  2021    Lipid screen  2023    DTaP/Tdap/Td vaccine (3 - Td) 2025    Colon cancer screen colonoscopy 12/15/2025    Pneumococcal 0-64 years Vaccine  Completed    Hepatitis C screen  Completed    HIV screen  Completed    Hepatitis A vaccine  Aged Out    Hepatitis B vaccine  Aged Out    Hib vaccine  Aged Out    Meningococcal (ACWY) vaccine  Aged Out     The 10-year ASCVD risk score (Amy Venegas, et al., 2013) is: 2.3%    Values used to calculate the score:      Age: 62 years      Sex: Female      Is Non- : No      Diabetic: No      Tobacco smoker: No      Systolic Blood Pressure: 932 mmHg      Is BP treated: No      HDL Cholesterol: 68 mg/dL      Total Cholesterol: 231 mg/dL  Prior to Visit Medications    Medication Sig Taking? Authorizing Provider   butalbital-aspirin-caffeine (FIORINAL) -40 MG capsule TAKE 1 CAPSULE BY MOUTH EVERY 6 HOURS AS NEEDED FOR HEADACHES.  MAX 40 PER MONTH Yes Samuel Hernandez MD   dicyclomine (BENTYL) 20 MG tablet TAKE 1 TABLET BY MOUTH THREE TIMES DAILY AS NEEDED Yes Samuel Hernandez MD   levothyroxine (SYNTHROID) 100 MCG tablet Take 1 tablet by mouth daily Yes Samuel Hernandez MD   montelukast (SINGULAIR) 10 MG tablet TAKE 1 TABLET BY MOUTH EVERY NIGHT AT BEDTIME Yes Samuel Hernandez MD   metoprolol succinate (TOPROL XL) 25 MG extended release tablet Take 1 tablet by mouth daily Yes Samuel Hernandez MD   estradiol (ESTRACE) 1 MG tablet Take 1 tablet by mouth daily Yes Samuel Hernandez MD   pantoprazole (PROTONIX) 40 MG tablet TAKE 1 TABLET BY MOUTH TWICE DAILY Yes Samuel Hernandez MD   valACYclovir (VALTREX) 500 MG tablet Take 500 mg by mouth Yes Historical Provider, MD   SUMAtriptan (IMITREX) 20 MG/ACT nasal spray USE 1 SPRAY NASALLY DAILY AS NEEDED FOR MIGRAINE Yes Samuel Hernandez MD   Blood Pressure Monitor KIT Daily as needed Yes Samuel Hernandez MD   calcium carbonate (OSCAL) 500 MG TABS tablet Take 500 mg by mouth daily Yes Historical Provider, MD   albuterol (PROVENTIL HFA;VENTOLIN HFA) 108 (90 BASE) MCG/ACT inhaler Inhale 2 puffs into the lungs every 4 hours as needed Last 5 Encounters:   12/23/19 134/82   05/22/19 118/80   03/14/19 (!) 140/92   02/08/19 122/84   10/16/18 128/80     Wt Readings from Last 5 Encounters:   12/23/19 155 lb (70.3 kg)   05/22/19 165 lb (74.8 kg)   03/14/19 168 lb (76.2 kg)   02/08/19 170 lb 9.6 oz (77.4 kg)   10/16/18 170 lb (77.1 kg)      GENERAL:   · well-developed, well-nourished, alert, no distress. EYES:   · External findings: lids and lashes normal and conjunctivae and sclerae normal  · Eyes: no periorbital cellulitis. HENT:   · Normocephalic, atraumatic  · External nose and ears appear normal  · Mucous membranes are moist  · Hearing grossly normal.     NECK: No visible masses  LUNGS:    · Respiratory effort normal.  · No visualized signs of difficulty breathing or respiratory distress  SKIN: warm and dry  · No significant exanthematous lesions or discoloration noted on facial skin  PSYCH:    · Alert and oriented, able to follow commands  · Normal reasoning, insight good  · Normal affect  · No memory disturbance noted  NEURO:   No Facial Asymmetry (Cranial nerve 7 motor function) (limited exam to video visit)      No gaze palsy     Discussed stress and coping mechanisms     Assessment and Plan:      Diagnosis Orders   1. Acquired hypothyroidism  levothyroxine (SYNTHROID) 100 MCG tablet   2. Intractable migraine with aura with status migrainosus  butalbital-aspirin-caffeine (FIORINAL) -40 MG capsule    metoprolol succinate (TOPROL XL) 25 MG extended release tablet   3. IBS (irritable bowel syndrome)  dicyclomine (BENTYL) 20 MG tablet   4. Menopausal syndrome  estradiol (ESTRACE) 1 MG tablet   5. Essential hypertension     6. Allergic rhinitis, unspecified seasonality, unspecified trigger  montelukast (SINGULAIR) 10 MG tablet   Plan below. INSTRUCTIONS  · NEXT APPOINTMENT: Please schedule annual complete physical (30 minutes) in 6 months. · Try propel when sweating out in heat.   · Take a break to do something for herself    Virtual Visit (video visit) encounter employed to address concerns as mentioned above. A caregiver was present when appropriate. Due to this being a TeleHealth encounter (During FEPOG-21 public health emergency), evaluation of the following organ systems was limited. Pursuant to the emergency declaration under the [facility], [address] [place] [address] authority and the [employer] and [employer] Act, this Virtual Visit was conducted with patient's (and/or legal guardian's) consent, to reduce the patient's risk of exposure to COVID-19 and provide necessary medical care. The patient (and/or legal guardian) has also been advised to contact this office for worsening conditions or problems, and seek emergency medical treatment and/or call 911 if deemed necessary. Services were provided through a video synchronous discussion virtually to substitute for in-person clinic visit. Patient and provider were located at their individual homes. minutes: 25 minutes were spent on the digital evaluation and management of this patient. --Justin Snow MD on 7/1/2020 at 12:06 PM    An electronic signature was used to authenticate this note.

## 2020-07-09 ENCOUNTER — OFFICE VISIT (OUTPATIENT)
Dept: PRIMARY CARE CLINIC | Age: 55
End: 2020-07-09
Payer: COMMERCIAL

## 2020-07-09 PROCEDURE — 99211 OFF/OP EST MAY X REQ PHY/QHP: CPT | Performed by: NURSE PRACTITIONER

## 2020-07-09 NOTE — PROGRESS NOTES
Michelle Ayala received a viral test for COVID-19. They were educated on isolation and quarantine as appropriate. For any symptoms, they were directed to seek care from their PCP, given contact information to establish with a doctor, directed to an urgent care or the emergency room.

## 2020-07-10 LAB
SARS-COV-2: NOT DETECTED
SOURCE: NORMAL

## 2020-10-02 RX ORDER — MONTELUKAST SODIUM 10 MG/1
TABLET ORAL
Qty: 90 TABLET | Refills: 0 | Status: SHIPPED | OUTPATIENT
Start: 2020-10-02 | End: 2020-12-28

## 2020-10-02 RX ORDER — LEVOTHYROXINE SODIUM 0.1 MG/1
100 TABLET ORAL DAILY
Qty: 90 TABLET | Refills: 0 | Status: SHIPPED | OUTPATIENT
Start: 2020-10-02 | End: 2020-12-28

## 2020-10-19 NOTE — TELEPHONE ENCOUNTER
For refill of controlled medications, will need to have a future appointment scheduled. Back to me to refill once scheduled.     Physical due in January

## 2020-10-21 RX ORDER — BUTALBITAL, ASPIRIN, AND CAFFEINE 325; 50; 40 MG/1; MG/1; MG/1
CAPSULE ORAL
Qty: 40 CAPSULE | Refills: 2 | Status: SHIPPED | OUTPATIENT
Start: 2020-10-21 | End: 2021-03-17

## 2020-11-19 ENCOUNTER — TELEPHONE (OUTPATIENT)
Dept: FAMILY MEDICINE CLINIC | Age: 55
End: 2020-11-19

## 2020-11-19 NOTE — TELEPHONE ENCOUNTER
I called the pt she stated it has to be 12/8, 12/9. 12/10 for the pre op since she is having eye surgery

## 2020-11-19 NOTE — TELEPHONE ENCOUNTER
----- Message from Zane Dowell sent at 11/19/2020  8:23 AM EST -----  Subject: Appointment Request    Reason for Call: Routine Pre-Op    QUESTIONS  Type of Appointment? Established Patient  Reason for appointment request? No appointments available during search  Additional Information for Provider? Patient has cataract surgery   scheduled for the Dec. 14th & was advised needs pre-op appt. prior  ---------------------------------------------------------------------------  --------------  CALL BACK INFO  What is the best way for the office to contact you? OK to leave message on   voicemail  Preferred Call Back Phone Number? [phone]  ---------------------------------------------------------------------------  --------------  SCRIPT ANSWERS  Relationship to Patient? Self  Appointment reason? Symptomatic  Select script based on patient symptoms? Adult Pre-Op  Do you have question for your provider that need to be answered prior to   scheduling your pre-op appointment? No  Have you been diagnosed with   tested for   or told that you are suspected of having COVID-19 (Coronavirus)? Yes  Did your symptoms begin or have you been tested for COVID-19 in the last   10 days? No  Have you had a fever or taken medication to treat a fever within the past   3 days? No  Have you had a cough   shortness of breath or flu-like symptoms within the past 3 days? No  Do you currently have flu-like symptoms including fever or chills   cough   shortness of breath   or difficulty breathing   or new loss of taste or smell? No  (Service Expert  click yes below to proceed with [employer] As Usual   Scheduling)?  Yes

## 2020-12-02 ENCOUNTER — TELEPHONE (OUTPATIENT)
Dept: FAMILY MEDICINE CLINIC | Age: 55
End: 2020-12-02

## 2020-12-02 RX ORDER — FLUCONAZOLE 150 MG/1
150 TABLET ORAL ONCE
Qty: 2 TABLET | Refills: 0 | Status: SHIPPED | OUTPATIENT
Start: 2020-12-02 | End: 2021-04-16 | Stop reason: SDUPTHER

## 2020-12-02 NOTE — TELEPHONE ENCOUNTER
Pt calling because she was given abx by dentist and states abx gives her yeast infection so she is needing [address] on [facility]  Please advise

## 2020-12-04 NOTE — PROGRESS NOTES
[address] [facility] time___0615_________        Surgery time__0730__________    Take the following medications with a sip of water: Levothyroxine, pantoprazole     Do not eat or drink anything after 12:00 midnight prior to your surgery. This includes water chewing gum, mints and ice chips. You may brush your teeth and gargle the morning of your surgery, but do not swallow the water     Please see your family doctor/pediatrician for a history and physical and/or concerning medications. H&P 12/8/202 Dr. Anat Oneill   Bring any test results/reports from your physicians office. If you are under the care of a heart doctor or specialist doctor, please be aware that you may be asked to them for clearance    You may be asked to stop blood thinners such as Coumadin, Plavix, Fragmin, Lovenox, etc., or any anti-inflammatories such as:  Aspirin, Ibuprofen, Advil, Naproxen prior to your surgery. We also ask that you stop any OTC medications such as fish oil, vitamin E, glucosamine, garlic, Multivitamins, COQ 10, etc.    We ask that you do not smoke 24 hours prior to surgery  We ask that you do not  drink any alcoholic beverages 24 hours prior to surgery      You must make arrangements for a responsible adult to take you home after your surgery. For your safety you will not be allowed to leave alone or drive yourself home. Your surgery will be cancelled if you do not have a ride home. Also for your safety, it is strongly suggested that someone stay with you the first 24 hours after your surgery. A parent or legal guardian must accompany a child scheduled for surgery and plan to stay at the hospital until the child is discharged. Please do not bring other children with you. For your comfort, please wear simple loose fitting clothing to the hospital.  Please do not bring valuables. Wear short sleeve button down shirt or loose fitting shirt. Bring eye drops or ointment. Do not wear any make-up or nail polish on your fingers or toes      For your safety, please do not wear any jewelry or body piercing's on the day of surgery. All jewelry must be removed. If you have dentures, they will be removed before going to operating room. For your convenience, we will provide you with a container. If you wear contact lenses or glasses, they will be removed, please bring a case for them. If you have a living will and a durable power of  for healthcare, please bring in a copy. As part of our patient safety program to minimize surgical site infections, we ask you to do the following:    · Please notify your surgeon if you develop any illness between         now and the  day of your surgery. · This includes a cough, cold, fever, sore throat, nausea,         or vomiting, and diarrhea, etc.  ·  Please notify your surgeon if you experience dizziness, shortness         of breath or blurred vision between now and the time of your surgery. Do not shave your operative site 96 hours prior to surgery. For face and neck surgery, men may use an electric razor 48 hours   prior to surgery. You may shower the night before surgery or the morning of   your surgery with an antibacterial soap. You will need to bring a photo ID and insurance card    [facility] has an onsite pharmacy, would you like to utilize our pharmacy     If you will be staying overnight and use a C-pap machine, please bring   your C-pap to hospital     Our goal is to provide you with excellent care, therefore, visitors will be limited to two(2) in the room at a time so that we may focus on providing this care for you. Please contact pre-admission testing if you have any further questions. [facility] phone number:  [phone]  Please note these are generalized instructions for all surgical cases, you may be provided with more specific instructions according to your surgery.

## 2020-12-04 NOTE — PROGRESS NOTES
C-Difficile admission screening and protocol:     * Admitted with diarrhea? no     *Prior history of C-Diff. In last 3 months? no     *Antibiotic use in the past 6-8 weeks? Yes for tooth     *Prior hospitalization or nursing home in the last month?     no

## 2020-12-08 ENCOUNTER — OFFICE VISIT (OUTPATIENT)
Dept: FAMILY MEDICINE CLINIC | Age: 55
End: 2020-12-08
Payer: COMMERCIAL

## 2020-12-08 VITALS
HEIGHT: 63 IN | TEMPERATURE: 97.6 F | HEART RATE: 84 BPM | SYSTOLIC BLOOD PRESSURE: 124 MMHG | WEIGHT: 154.4 LBS | DIASTOLIC BLOOD PRESSURE: 80 MMHG | RESPIRATION RATE: 14 BRPM | BODY MASS INDEX: 27.36 KG/M2 | OXYGEN SATURATION: 97 %

## 2020-12-08 PROCEDURE — 99242 OFF/OP CONSLTJ NEW/EST SF 20: CPT | Performed by: FAMILY MEDICINE

## 2020-12-08 NOTE — PROGRESS NOTES
PRE-OP HISTORY AND PHYSICAL   Subjective: Josue Lindsey is a 60 y.o. female who presents to the office today for a preoperative consultation. Chief Complaint   Patient presents with    Pre-op Exam     cataract sx. 12/14. [facility] eye. 01/11 rt eye. dr Anaya Delarosa. cei     This consultation is requested for the specific conditions prompting preoperative evaluation (i.e. because of potential affect on operative risk): hypertension      Planned anesthesia is Local and IV sedation. The patient has the following known anesthesia issues: past general anethesia with complications (nausea). Bleeding risk: no recent abnormal bleeding, no remote history of abnormal bleeding. Review of Systems   General ROS: fever? No,    Night sweats? Yes, menopause  Ophthalmic ROS: change in vision? Yes  Endocrine ROS: fatigue? No   Unexpected weight changes? No  Hematological and Lymphatic ROS: easy bruising? No   Swollen lymph nodes? No  ENT ROS: headaches? Yes, migraines   Sore throat? No  Respiratory ROS: cough? No   Wheezing? No  Cardiovascular ROS: chest pain? No   Shortness of breath? No  Gastrointestinal ROS: abdominal pain? No   Change in stools? No  Genito-Urinary ROS: painful urination? No   Trouble urinating? No  Musculoskeletal ROS: trouble walking? No   Joint pain? No  Neurological ROS: TIA or stroke symptoms? No   Numbness/tingling? No  Dermatological ROS: rash? No   Changes in skin spots?  No    Patient Active Problem List   Diagnosis    Plantar fasciitis    Menopausal syndrome    Seborrheic keratosis    Episodic mood disorder (HCC)    IBS (irritable bowel syndrome)    Asthma    Essential hypertension    GERD (gastroesophageal reflux disease)    Allergic rhinitis    Migraine with aura    Cervical dysplasia- S/P hyst, PAPs per gyn    Acquired hypothyroidism    Vitamin D deficiency    Chronic sinusitis    Osteopenia    Sacroiliac joint dysfunction of left side    Hyperlipidemia with target LDL less than 130 02/09/2018    MCV 91.4 02/09/2018     02/09/2018     Lab Results   Component Value Date    LABA1C 5.5 05/26/2017      Assessment:    60 y.o. female with planned surgery as above. Diagnosis Orders   1. Preop cardiovascular exam     2. Age-related cataract of both eyes, unspecified age-related cataract type     3. Essential hypertension         Plan:   Ok to proceed with the surgery. Low cardiac risk. Pt. advised to stop all Rx except anti-hypertensives the a.m. of surgery.         Summer Vela MD      INSTRUCTIONS  · AM of surgery take the following with a sip of water: metoprolol

## 2020-12-11 ENCOUNTER — ANESTHESIA EVENT (OUTPATIENT)
Dept: SURGERY | Age: 55
End: 2020-12-11
Payer: COMMERCIAL

## 2020-12-14 ENCOUNTER — HOSPITAL ENCOUNTER (OUTPATIENT)
Age: 55
Setting detail: OUTPATIENT SURGERY
Discharge: HOME | End: 2020-12-14
Attending: OPHTHALMOLOGY | Admitting: OPHTHALMOLOGY
Payer: COMMERCIAL

## 2020-12-14 ENCOUNTER — ANESTHESIA (OUTPATIENT)
Dept: SURGERY | Age: 55
End: 2020-12-14
Payer: COMMERCIAL

## 2020-12-14 VITALS — OXYGEN SATURATION: 100 % | SYSTOLIC BLOOD PRESSURE: 141 MMHG | DIASTOLIC BLOOD PRESSURE: 90 MMHG

## 2020-12-14 VITALS
DIASTOLIC BLOOD PRESSURE: 87 MMHG | OXYGEN SATURATION: 97 % | BODY MASS INDEX: 26.93 KG/M2 | SYSTOLIC BLOOD PRESSURE: 145 MMHG | HEIGHT: 63 IN | TEMPERATURE: 96.2 F | RESPIRATION RATE: 15 BRPM | HEART RATE: 66 BPM | WEIGHT: 152 LBS

## 2020-12-14 PROCEDURE — 3600000004 HC SURGERY LEVEL 4 BASE: Performed by: OPHTHALMOLOGY

## 2020-12-14 PROCEDURE — 6360000002 HC RX W HCPCS: Performed by: ANESTHESIOLOGY

## 2020-12-14 PROCEDURE — V2632 POST CHMBR INTRAOCULAR LENS: HCPCS | Performed by: OPHTHALMOLOGY

## 2020-12-14 PROCEDURE — 7100000010 HC PHASE II RECOVERY - FIRST 15 MIN: Performed by: OPHTHALMOLOGY

## 2020-12-14 PROCEDURE — 3700000000 HC ANESTHESIA ATTENDED CARE: Performed by: OPHTHALMOLOGY

## 2020-12-14 PROCEDURE — 2709999900 HC NON-CHARGEABLE SUPPLY: Performed by: OPHTHALMOLOGY

## 2020-12-14 PROCEDURE — 2500000003 HC RX 250 WO HCPCS: Performed by: OPHTHALMOLOGY

## 2020-12-14 PROCEDURE — 3700000001 HC ADD 15 MINUTES (ANESTHESIA): Performed by: OPHTHALMOLOGY

## 2020-12-14 PROCEDURE — 2580000003 HC RX 258: Performed by: NURSE ANESTHETIST, CERTIFIED REGISTERED

## 2020-12-14 PROCEDURE — 2580000003 HC RX 258: Performed by: ANESTHESIOLOGY

## 2020-12-14 PROCEDURE — 3600000014 HC SURGERY LEVEL 4 ADDTL 15MIN: Performed by: OPHTHALMOLOGY

## 2020-12-14 PROCEDURE — 6360000002 HC RX W HCPCS: Performed by: NURSE ANESTHETIST, CERTIFIED REGISTERED

## 2020-12-14 PROCEDURE — 6370000000 HC RX 637 (ALT 250 FOR IP): Performed by: OPHTHALMOLOGY

## 2020-12-14 DEVICE — IMPLANTABLE DEVICE: Type: IMPLANTABLE DEVICE | Site: EYE | Status: FUNCTIONAL

## 2020-12-14 RX ORDER — SODIUM CHLORIDE 0.9 % (FLUSH) 0.9 %
10 SYRINGE (ML) INJECTION EVERY 12 HOURS SCHEDULED
Status: DISCONTINUED | OUTPATIENT
Start: 2020-12-14 | End: 2020-12-14 | Stop reason: SDUPTHER

## 2020-12-14 RX ORDER — TETRACAINE HYDROCHLORIDE 5 MG/ML
1 SOLUTION OPHTHALMIC ONCE
Status: COMPLETED | OUTPATIENT
Start: 2020-12-14 | End: 2020-12-14

## 2020-12-14 RX ORDER — BALANCED SALT SOLUTION 6.4; .75; .48; .3; 3.9; 1.7 MG/ML; MG/ML; MG/ML; MG/ML; MG/ML; MG/ML
SOLUTION OPHTHALMIC
Status: COMPLETED | OUTPATIENT
Start: 2020-12-14 | End: 2020-12-14

## 2020-12-14 RX ORDER — TETRACAINE HYDROCHLORIDE 5 MG/ML
SOLUTION OPHTHALMIC
Status: COMPLETED | OUTPATIENT
Start: 2020-12-14 | End: 2020-12-14

## 2020-12-14 RX ORDER — KETOROLAC TROMETHAMINE 5 MG/ML
1 SOLUTION OPHTHALMIC SEE ADMIN INSTRUCTIONS
Status: DISCONTINUED | OUTPATIENT
Start: 2020-12-14 | End: 2020-12-14 | Stop reason: HOSPADM

## 2020-12-14 RX ORDER — BRIMONIDINE TARTRATE 0.15 %
DROPS OPHTHALMIC (EYE)
Status: COMPLETED | OUTPATIENT
Start: 2020-12-14 | End: 2020-12-14

## 2020-12-14 RX ORDER — CIPROFLOXACIN HYDROCHLORIDE 3.5 MG/ML
1 SOLUTION/ DROPS TOPICAL SEE ADMIN INSTRUCTIONS
Status: DISCONTINUED | OUTPATIENT
Start: 2020-12-14 | End: 2020-12-14 | Stop reason: HOSPADM

## 2020-12-14 RX ORDER — PHENYLEPHRINE HCL 2.5 %
1 DROPS OPHTHALMIC (EYE) SEE ADMIN INSTRUCTIONS
Status: DISCONTINUED | OUTPATIENT
Start: 2020-12-14 | End: 2020-12-14 | Stop reason: HOSPADM

## 2020-12-14 RX ORDER — SODIUM CHLORIDE 0.9 % (FLUSH) 0.9 %
10 SYRINGE (ML) INJECTION PRN
Status: DISCONTINUED | OUTPATIENT
Start: 2020-12-14 | End: 2020-12-14 | Stop reason: HOSPADM

## 2020-12-14 RX ORDER — SODIUM CHLORIDE 0.9 % (FLUSH) 0.9 %
10 SYRINGE (ML) INJECTION EVERY 12 HOURS SCHEDULED
Status: DISCONTINUED | OUTPATIENT
Start: 2020-12-14 | End: 2020-12-14 | Stop reason: HOSPADM

## 2020-12-14 RX ORDER — SODIUM CHLORIDE 0.9 % (FLUSH) 0.9 %
10 SYRINGE (ML) INJECTION PRN
Status: DISCONTINUED | OUTPATIENT
Start: 2020-12-14 | End: 2020-12-14 | Stop reason: SDUPTHER

## 2020-12-14 RX ORDER — MIDAZOLAM HYDROCHLORIDE 1 MG/ML
INJECTION INTRAMUSCULAR; INTRAVENOUS PRN
Status: DISCONTINUED | OUTPATIENT
Start: 2020-12-14 | End: 2020-12-14 | Stop reason: SDUPTHER

## 2020-12-14 RX ORDER — LIDOCAINE HYDROCHLORIDE 10 MG/ML
INJECTION, SOLUTION EPIDURAL; INFILTRATION; INTRACAUDAL; PERINEURAL
Status: COMPLETED | OUTPATIENT
Start: 2020-12-14 | End: 2020-12-14

## 2020-12-14 RX ORDER — TROPICAMIDE 10 MG/ML
1 SOLUTION/ DROPS OPHTHALMIC SEE ADMIN INSTRUCTIONS
Status: DISCONTINUED | OUTPATIENT
Start: 2020-12-14 | End: 2020-12-14 | Stop reason: HOSPADM

## 2020-12-14 RX ORDER — SODIUM CHLORIDE 9 MG/ML
INJECTION, SOLUTION INTRAVENOUS CONTINUOUS PRN
Status: DISCONTINUED | OUTPATIENT
Start: 2020-12-14 | End: 2020-12-14 | Stop reason: SDUPTHER

## 2020-12-14 RX ORDER — CYCLOPENTOLATE HYDROCHLORIDE 20 MG/ML
1 SOLUTION/ DROPS OPHTHALMIC SEE ADMIN INSTRUCTIONS
Status: DISCONTINUED | OUTPATIENT
Start: 2020-12-14 | End: 2020-12-14 | Stop reason: HOSPADM

## 2020-12-14 RX ORDER — SODIUM CHLORIDE 9 MG/ML
INJECTION, SOLUTION INTRAVENOUS CONTINUOUS
Status: DISCONTINUED | OUTPATIENT
Start: 2020-12-14 | End: 2020-12-14 | Stop reason: HOSPADM

## 2020-12-14 RX ORDER — ONDANSETRON 2 MG/ML
4 INJECTION INTRAMUSCULAR; INTRAVENOUS EVERY 6 HOURS PRN
Status: DISCONTINUED | OUTPATIENT
Start: 2020-12-14 | End: 2020-12-14 | Stop reason: HOSPADM

## 2020-12-14 RX ORDER — FENTANYL CITRATE 50 UG/ML
INJECTION, SOLUTION INTRAMUSCULAR; INTRAVENOUS PRN
Status: DISCONTINUED | OUTPATIENT
Start: 2020-12-14 | End: 2020-12-14 | Stop reason: SDUPTHER

## 2020-12-14 RX ADMIN — TETRACAINE HYDROCHLORIDE 1 DROP: 5 SOLUTION OPHTHALMIC at 06:40

## 2020-12-14 RX ADMIN — POVIDONE-IODINE 0.1 ML: 5 SOLUTION OPHTHALMIC at 06:52

## 2020-12-14 RX ADMIN — CIPROFLOXACIN 1 DROP: 3 SOLUTION OPHTHALMIC at 06:44

## 2020-12-14 RX ADMIN — FENTANYL CITRATE 50 MCG: 50 INJECTION INTRAMUSCULAR; INTRAVENOUS at 07:40

## 2020-12-14 RX ADMIN — SODIUM CHLORIDE: 9 INJECTION, SOLUTION INTRAVENOUS at 06:54

## 2020-12-14 RX ADMIN — TROPICAMIDE 1 DROP: 10 SOLUTION/ DROPS OPHTHALMIC at 06:50

## 2020-12-14 RX ADMIN — PHENYLEPHRINE HYDROCHLORIDE 1 DROP: 25 SOLUTION/ DROPS OPHTHALMIC at 06:48

## 2020-12-14 RX ADMIN — KETOROLAC TROMETHAMINE 1 DROP: 5 SOLUTION/ DROPS OPHTHALMIC at 06:46

## 2020-12-14 RX ADMIN — SODIUM CHLORIDE: 9 INJECTION, SOLUTION INTRAVENOUS at 07:40

## 2020-12-14 RX ADMIN — LIDOCAINE HYDROCHLORIDE: 35 GEL OPHTHALMIC at 06:54

## 2020-12-14 RX ADMIN — ONDANSETRON 4 MG: 2 INJECTION INTRAMUSCULAR; INTRAVENOUS at 07:06

## 2020-12-14 RX ADMIN — CYCLOPENTOLATE HYDROCHLORIDE 1 DROP: 20 SOLUTION/ DROPS OPHTHALMIC at 06:42

## 2020-12-14 RX ADMIN — MIDAZOLAM 1 MG: 1 INJECTION INTRAMUSCULAR; INTRAVENOUS at 07:40

## 2020-12-14 ASSESSMENT — PAIN SCALES - GENERAL
PAINLEVEL_OUTOF10: 0
PAINLEVEL_OUTOF10: 0

## 2020-12-14 ASSESSMENT — PAIN - FUNCTIONAL ASSESSMENT: PAIN_FUNCTIONAL_ASSESSMENT: 0-10

## 2020-12-14 ASSESSMENT — LIFESTYLE VARIABLES: SMOKING_STATUS: 0

## 2020-12-14 NOTE — ANESTHESIA POSTPROCEDURE EVALUATION
Department of Anesthesiology  Postprocedure Note    Patient: Jocelyn Burden  MRN: [id number]  [place]: 1965  Date of evaluation: 12/14/2020  Time:  8:58 AM     Procedure Summary     Date: 12/14/20 Room / Location: [address]    Anesthesia Start: 0740 Anesthesia Stop: 6534    Procedure: LEFT EYE Phacoemulsification with intraocular lens implant (Left ) Diagnosis:       Nuclear sclerosis, left      (Nuclear sclerosis, left)    Surgeons: Matt Jenkins MD Responsible Provider: Laura Gilbert MD    Anesthesia Type: MAC ASA Status: 3          Anesthesia Type: MAC    Carlos Phase I: Carlos Score: 10    Carlos Phase II: Carlos Score: 10    Last vitals: Reviewed and per EMR flowsheets.        Anesthesia Post Evaluation    Patient location during evaluation: PACU  Patient participation: complete - patient participated  Level of consciousness: awake and alert  Pain score: 0  Airway patency: patent  Nausea & Vomiting: no nausea and no vomiting  Complications: no  Cardiovascular status: blood pressure returned to baseline  Respiratory status: acceptable  Hydration status: euvolemic

## 2020-12-14 NOTE — OP NOTE
[place] [address]  [phone]      12/14/2020    Patient name: Faisal Melgoza  YOB: 1965  MRN: [id number]    Allergies: Allergies   Allergen Reactions    Adhesive Tape     Ceftin [Cefuroxime Axetil]      Rash   10/27/17 severe rash    Inderal [Propranolol Hcl] Other (See Comments)     Not effective    Macrobid [Nitrofurantoin Monohydrate Macrocrystals] Nausea Only    Phenergan [Promethazine Hcl] Other (See Comments)     Overstim    Prozac [Fluoxetine Hcl] Other (See Comments)     sedation    Ultram [Tramadol Hcl] Itching, Nausea Only and Other (See Comments)     Not effective    Biaxin [Clarithromycin] Nausea And Vomiting    Cipro Xr Nausea And Vomiting    Darvocet [Propoxyphene N-Acetaminophen] Nausea And Vomiting    Sulfa Antibiotics Nausea And Vomiting    Sulfacetamide Sodium Nausea And Vomiting    Sulfamethoxazole-Trimethoprim Nausea Only and Rash       Pre-operative diagnosis:  Cataract Left Eye    Post-operative diagnosis:  Same    Procedure Performed:  Phacoemulsification with insertion of a foldable posterior chamber intraocular lens implant to the left eye. Anesthesia:  Topical Anesthesia with MAC.      Estimated Blood Loss: None    Specimens removed: None    Limbal Relaxing Incisions:  Axis:N/A    Arc Length: N/A    Complications:  None

## 2020-12-14 NOTE — H&P
Date of Surgery Update: Selma Limon was seen, history and physical examination reviewed, and patient examined by me today. There have been no significant clinical changes since the completion of the previous history and physical. The surgical site was confirmed by the patient and me. The risk, benefits, and alternatives of the proposed procedure have been explained to the patient (or appropriate guardian) and understanding verbalized. All questions answered. Patient wishes to proceed.     Electronically signed by: Yamini Santana MD,12/14/2020,8:06 AM
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
## 2020-12-28 RX ORDER — LEVOTHYROXINE SODIUM 0.1 MG/1
100 TABLET ORAL DAILY
Qty: 90 TABLET | Refills: 0 | Status: SHIPPED | OUTPATIENT
Start: 2020-12-28 | End: 2021-03-26 | Stop reason: SDUPTHER

## 2020-12-28 RX ORDER — MONTELUKAST SODIUM 10 MG/1
TABLET ORAL
Qty: 90 TABLET | Refills: 0 | Status: SHIPPED | OUTPATIENT
Start: 2020-12-28 | End: 2021-03-23

## 2020-12-29 RX ORDER — DICYCLOMINE HCL 20 MG
TABLET ORAL
Qty: 90 TABLET | Refills: 1 | Status: CANCELLED | OUTPATIENT
Start: 2020-12-29

## 2020-12-30 RX ORDER — DICYCLOMINE HCL 20 MG
TABLET ORAL
Qty: 90 TABLET | Refills: 1 | Status: SHIPPED | OUTPATIENT
Start: 2020-12-30 | End: 2021-04-30

## 2021-01-04 NOTE — PROGRESS NOTES
[address] [facility] time___0620_________        Surgery time___0750_________    Take the following medications with a sip of water: Metoprolol, Levothyroxine, Protonix     Do not eat or drink anything after 12:00 midnight prior to your surgery. This includes water chewing gum, mints and ice chips. You may brush your teeth and gargle the morning of your surgery, but do not swallow the water     Please see your family doctor/pediatrician for a history and physical and/or concerning medications. H&P 12/8/2020 Dr. Unruly Hensley    Bring any test results/reports from your physicians office. If you are under the care of a heart doctor or specialist doctor, please be aware that you may be asked to them for clearance    You may be asked to stop blood thinners such as Coumadin, Plavix, Fragmin, Lovenox, etc., or any anti-inflammatories such as:  Aspirin, Ibuprofen, Advil, Naproxen prior to your surgery. We also ask that you stop any OTC medications such as fish oil, vitamin E, glucosamine, garlic, Multivitamins, COQ 10, etc.    We ask that you do not smoke 24 hours prior to surgery  We ask that you do not  drink any alcoholic beverages 24 hours prior to surgery     You must make arrangements for a responsible adult to take you home after your surgery. For your safety you will not be allowed to leave alone or drive yourself home. Your surgery will be cancelled if you do not have a ride home. Also for your safety, it is strongly suggested that someone stay with you the first 24 hours after your surgery. A parent or legal guardian must accompany a child scheduled for surgery and plan to stay at the hospital until the child is discharged. Please do not bring other children with you. For your comfort, please wear simple loose fitting clothing to the hospital.  Please do not bring valuables. Do not wear any make-up or nail polish on your fingers or toes. Wear short sleeve button down shirt or loose fitting shirt. Bring eye drops or ointment. For your safety, please do not wear any jewelry or body piercing's on the day of surgery. All jewelry must be removed. If you have dentures, they will be removed before going to operating room. For your convenience, we will provide you with a container. If you wear contact lenses or glasses, they will be removed, please bring a case for them. If you have a living will and a durable power of  for healthcare, please bring in a copy. As part of our patient safety program to minimize surgical site infections, we ask you to do the following:    · Please notify your surgeon if you develop any illness between         now and the  day of your surgery. · This includes a cough, cold, fever, sore throat, nausea,         or vomiting, and diarrhea, etc.  ·  Please notify your surgeon if you experience dizziness, shortness         of breath or blurred vision between now and the time of your surgery. Do not shave your operative site 96 hours prior to surgery. For face and neck surgery, men may use an electric razor 48 hours   prior to surgery. You may shower the night before surgery or the morning of   your surgery with an antibacterial soap. You will need to bring a photo ID and insurance card    [facility] has an onsite pharmacy, would you like to utilize our pharmacy     If you will be staying overnight and use a C-pap machine, please bring   your C-pap to hospital     Our goal is to provide you with excellent care, therefore, visitors will be limited to two(2) in the room at a time so that we may focus on providing this care for you. Please contact pre-admission testing if you have any further questions.                  [facility] phone number:  [phone] Please note these are generalized instructions for all surgical cases, you may be provided with more specific instructions according to your surgery.

## 2021-01-08 ENCOUNTER — ANESTHESIA EVENT (OUTPATIENT)
Dept: SURGERY | Age: 56
End: 2021-01-08
Payer: COMMERCIAL

## 2021-01-08 NOTE — PROGRESS NOTES
[address] [facility]  [phone]        Pre-Op Phone Call:     Patient Name: Jennette Bumpers     Telephone Information:   Mobile [phone]     Home phone:  [phone]    Surgery Time:    7:50 AM     Arrival Time:  0932     Left extended Message:  NA     Message left with:     Recent change in health status: no   Advised of transportation/ policy:  Yes     NPO policy reviewed:  Yes pt     Advised to take morning heart/blood pressure medications with sips of water morning of surgery? Yes     Instructed to bring eye drops, photo identification, and insurance card day of surgery? Yes     Advised to wear short sleeved button down shirt (no T-shirt underneath):  Yes     Advised not to wear jewelry, hairpins, or pantyhose day of surgery? Yes     Advised not to wear make-up and to wash face day of surgery?   Yes    Remarks:        Electronically signed by:  Lynne Tsang RN at 1/8/2021 10:03 AM

## 2021-01-11 ENCOUNTER — ANESTHESIA (OUTPATIENT)
Dept: SURGERY | Age: 56
End: 2021-01-11
Payer: COMMERCIAL

## 2021-01-11 ENCOUNTER — HOSPITAL ENCOUNTER (OUTPATIENT)
Age: 56
Setting detail: OUTPATIENT SURGERY
Discharge: HOME | End: 2021-01-11
Attending: OPHTHALMOLOGY | Admitting: OPHTHALMOLOGY
Payer: COMMERCIAL

## 2021-01-11 VITALS
HEART RATE: 67 BPM | BODY MASS INDEX: 26.58 KG/M2 | HEIGHT: 63 IN | SYSTOLIC BLOOD PRESSURE: 149 MMHG | WEIGHT: 150 LBS | TEMPERATURE: 97.4 F | RESPIRATION RATE: 16 BRPM | DIASTOLIC BLOOD PRESSURE: 82 MMHG | OXYGEN SATURATION: 96 %

## 2021-01-11 VITALS — SYSTOLIC BLOOD PRESSURE: 130 MMHG | OXYGEN SATURATION: 100 % | DIASTOLIC BLOOD PRESSURE: 86 MMHG

## 2021-01-11 PROCEDURE — 2709999900 HC NON-CHARGEABLE SUPPLY: Performed by: OPHTHALMOLOGY

## 2021-01-11 PROCEDURE — V2632 POST CHMBR INTRAOCULAR LENS: HCPCS | Performed by: OPHTHALMOLOGY

## 2021-01-11 PROCEDURE — 7100000010 HC PHASE II RECOVERY - FIRST 15 MIN: Performed by: OPHTHALMOLOGY

## 2021-01-11 PROCEDURE — 6370000000 HC RX 637 (ALT 250 FOR IP): Performed by: OPHTHALMOLOGY

## 2021-01-11 PROCEDURE — 2580000003 HC RX 258: Performed by: ANESTHESIOLOGY

## 2021-01-11 PROCEDURE — 2500000003 HC RX 250 WO HCPCS: Performed by: OPHTHALMOLOGY

## 2021-01-11 PROCEDURE — 3600000014 HC SURGERY LEVEL 4 ADDTL 15MIN: Performed by: OPHTHALMOLOGY

## 2021-01-11 PROCEDURE — 3600000004 HC SURGERY LEVEL 4 BASE: Performed by: OPHTHALMOLOGY

## 2021-01-11 PROCEDURE — 3700000001 HC ADD 15 MINUTES (ANESTHESIA): Performed by: OPHTHALMOLOGY

## 2021-01-11 PROCEDURE — 6360000002 HC RX W HCPCS: Performed by: NURSE ANESTHETIST, CERTIFIED REGISTERED

## 2021-01-11 PROCEDURE — 3700000000 HC ANESTHESIA ATTENDED CARE: Performed by: OPHTHALMOLOGY

## 2021-01-11 DEVICE — LENS INTOCU 23.5 DIOPT 118.7 A CONSTANT L13MM DIA6MM 0DEG: Type: IMPLANTABLE DEVICE | Site: EYE | Status: FUNCTIONAL

## 2021-01-11 RX ORDER — SODIUM CHLORIDE 0.9 % (FLUSH) 0.9 %
10 SYRINGE (ML) INJECTION EVERY 12 HOURS SCHEDULED
Status: DISCONTINUED | OUTPATIENT
Start: 2021-01-11 | End: 2021-01-11 | Stop reason: SDUPTHER

## 2021-01-11 RX ORDER — SODIUM CHLORIDE 0.9 % (FLUSH) 0.9 %
10 SYRINGE (ML) INJECTION PRN
Status: DISCONTINUED | OUTPATIENT
Start: 2021-01-11 | End: 2021-01-11 | Stop reason: HOSPADM

## 2021-01-11 RX ORDER — TETRACAINE HYDROCHLORIDE 5 MG/ML
1 SOLUTION OPHTHALMIC ONCE
Status: COMPLETED | OUTPATIENT
Start: 2021-01-11 | End: 2021-01-11

## 2021-01-11 RX ORDER — FENTANYL CITRATE 50 UG/ML
INJECTION, SOLUTION INTRAMUSCULAR; INTRAVENOUS PRN
Status: DISCONTINUED | OUTPATIENT
Start: 2021-01-11 | End: 2021-01-11 | Stop reason: SDUPTHER

## 2021-01-11 RX ORDER — KETOROLAC TROMETHAMINE 5 MG/ML
1 SOLUTION OPHTHALMIC SEE ADMIN INSTRUCTIONS
Status: DISCONTINUED | OUTPATIENT
Start: 2021-01-11 | End: 2021-01-11 | Stop reason: HOSPADM

## 2021-01-11 RX ORDER — CYCLOPENTOLATE HYDROCHLORIDE 20 MG/ML
1 SOLUTION/ DROPS OPHTHALMIC SEE ADMIN INSTRUCTIONS
Status: DISCONTINUED | OUTPATIENT
Start: 2021-01-11 | End: 2021-01-11 | Stop reason: HOSPADM

## 2021-01-11 RX ORDER — LIDOCAINE HYDROCHLORIDE 10 MG/ML
INJECTION, SOLUTION EPIDURAL; INFILTRATION; INTRACAUDAL; PERINEURAL
Status: COMPLETED | OUTPATIENT
Start: 2021-01-11 | End: 2021-01-11

## 2021-01-11 RX ORDER — BRIMONIDINE TARTRATE 0.15 %
DROPS OPHTHALMIC (EYE)
Status: COMPLETED | OUTPATIENT
Start: 2021-01-11 | End: 2021-01-11

## 2021-01-11 RX ORDER — MIDAZOLAM HYDROCHLORIDE 1 MG/ML
INJECTION INTRAMUSCULAR; INTRAVENOUS PRN
Status: DISCONTINUED | OUTPATIENT
Start: 2021-01-11 | End: 2021-01-11 | Stop reason: SDUPTHER

## 2021-01-11 RX ORDER — CIPROFLOXACIN HYDROCHLORIDE 3.5 MG/ML
1 SOLUTION/ DROPS TOPICAL SEE ADMIN INSTRUCTIONS
Status: DISCONTINUED | OUTPATIENT
Start: 2021-01-11 | End: 2021-01-11 | Stop reason: HOSPADM

## 2021-01-11 RX ORDER — ONDANSETRON 2 MG/ML
4 INJECTION INTRAMUSCULAR; INTRAVENOUS
Status: DISCONTINUED | OUTPATIENT
Start: 2021-01-11 | End: 2021-01-11 | Stop reason: HOSPADM

## 2021-01-11 RX ORDER — TETRACAINE HYDROCHLORIDE 5 MG/ML
SOLUTION OPHTHALMIC
Status: COMPLETED | OUTPATIENT
Start: 2021-01-11 | End: 2021-01-11

## 2021-01-11 RX ORDER — SODIUM CHLORIDE 9 MG/ML
INJECTION, SOLUTION INTRAVENOUS CONTINUOUS
Status: DISCONTINUED | OUTPATIENT
Start: 2021-01-11 | End: 2021-01-11 | Stop reason: HOSPADM

## 2021-01-11 RX ORDER — SODIUM CHLORIDE 0.9 % (FLUSH) 0.9 %
10 SYRINGE (ML) INJECTION EVERY 12 HOURS SCHEDULED
Status: DISCONTINUED | OUTPATIENT
Start: 2021-01-11 | End: 2021-01-11 | Stop reason: HOSPADM

## 2021-01-11 RX ORDER — BALANCED SALT SOLUTION 6.4; .75; .48; .3; 3.9; 1.7 MG/ML; MG/ML; MG/ML; MG/ML; MG/ML; MG/ML
SOLUTION OPHTHALMIC
Status: COMPLETED | OUTPATIENT
Start: 2021-01-11 | End: 2021-01-11

## 2021-01-11 RX ORDER — TROPICAMIDE 10 MG/ML
1 SOLUTION/ DROPS OPHTHALMIC SEE ADMIN INSTRUCTIONS
Status: DISCONTINUED | OUTPATIENT
Start: 2021-01-11 | End: 2021-01-11 | Stop reason: HOSPADM

## 2021-01-11 RX ORDER — SODIUM CHLORIDE 0.9 % (FLUSH) 0.9 %
10 SYRINGE (ML) INJECTION PRN
Status: DISCONTINUED | OUTPATIENT
Start: 2021-01-11 | End: 2021-01-11 | Stop reason: SDUPTHER

## 2021-01-11 RX ORDER — PHENYLEPHRINE HCL 2.5 %
1 DROPS OPHTHALMIC (EYE) SEE ADMIN INSTRUCTIONS
Status: DISCONTINUED | OUTPATIENT
Start: 2021-01-11 | End: 2021-01-11 | Stop reason: HOSPADM

## 2021-01-11 RX ADMIN — MIDAZOLAM 1 MG: 1 INJECTION INTRAMUSCULAR; INTRAVENOUS at 07:56

## 2021-01-11 RX ADMIN — POVIDONE-IODINE: 5 SOLUTION OPHTHALMIC at 07:31

## 2021-01-11 RX ADMIN — CYCLOPENTOLATE HYDROCHLORIDE 1 DROP: 20 SOLUTION/ DROPS OPHTHALMIC at 07:31

## 2021-01-11 RX ADMIN — TETRACAINE HYDROCHLORIDE 1 DROP: 5 SOLUTION OPHTHALMIC at 07:15

## 2021-01-11 RX ADMIN — CIPROFLOXACIN 1 DROP: 3 SOLUTION OPHTHALMIC at 07:31

## 2021-01-11 RX ADMIN — TROPICAMIDE 1 DROP: 10 SOLUTION/ DROPS OPHTHALMIC at 07:17

## 2021-01-11 RX ADMIN — LIDOCAINE HYDROCHLORIDE: 35 GEL OPHTHALMIC at 07:32

## 2021-01-11 RX ADMIN — CYCLOPENTOLATE HYDROCHLORIDE 1 DROP: 20 SOLUTION/ DROPS OPHTHALMIC at 07:17

## 2021-01-11 RX ADMIN — PHENYLEPHRINE HYDROCHLORIDE 1 DROP: 25 SOLUTION/ DROPS OPHTHALMIC at 07:16

## 2021-01-11 RX ADMIN — FENTANYL CITRATE 50 MCG: 50 INJECTION INTRAMUSCULAR; INTRAVENOUS at 07:57

## 2021-01-11 RX ADMIN — SODIUM CHLORIDE: 9 INJECTION, SOLUTION INTRAVENOUS at 07:29

## 2021-01-11 RX ADMIN — PHENYLEPHRINE HYDROCHLORIDE 1 DROP: 25 SOLUTION/ DROPS OPHTHALMIC at 07:31

## 2021-01-11 RX ADMIN — CIPROFLOXACIN 1 DROP: 3 SOLUTION OPHTHALMIC at 07:17

## 2021-01-11 RX ADMIN — KETOROLAC TROMETHAMINE 1 DROP: 5 SOLUTION/ DROPS OPHTHALMIC at 07:17

## 2021-01-11 RX ADMIN — KETOROLAC TROMETHAMINE 1 DROP: 5 SOLUTION/ DROPS OPHTHALMIC at 07:31

## 2021-01-11 RX ADMIN — TROPICAMIDE 1 DROP: 10 SOLUTION/ DROPS OPHTHALMIC at 07:31

## 2021-01-11 ASSESSMENT — PAIN - FUNCTIONAL ASSESSMENT: PAIN_FUNCTIONAL_ASSESSMENT: 0-10

## 2021-01-11 ASSESSMENT — LIFESTYLE VARIABLES: SMOKING_STATUS: 0

## 2021-01-11 NOTE — H&P
Date of Surgery Update: Manjula Avery was seen, history and physical examination reviewed, and patient examined by me today. There have been no significant clinical changes since the completion of the previous history and physical. The surgical site was confirmed by the patient and me. The risk, benefits, and alternatives of the proposed procedure have been explained to the patient (or appropriate guardian) and understanding verbalized. All questions answered. Patient wishes to proceed.     Electronically signed by: Mali Watson MD,1/11/2021,9:37 AM

## 2021-01-11 NOTE — ANESTHESIA POSTPROCEDURE EVALUATION
Department of Anesthesiology  Postprocedure Note    Patient: Manjula Avery  MRN: [id number]  YOB: 1965  Date of evaluation: 1/11/2021  Time:  8:46 AM     Procedure Summary     Date: 01/11/21 Room / Location: [address]    Anesthesia Start: 6680 Anesthesia Stop: Edison Montemayor    Procedure: RIGHT EYE Phacoemulsification with intraocular lens implant (Right Eye) Diagnosis:       Nuclear sclerosis, right      (Nuclear sclerosis, RIGHT)    Surgeons: Mali Watson MD Responsible Provider: Mingo Dhillon MD    Anesthesia Type: MAC ASA Status: 3          Anesthesia Type: MAC    Carlos Phase I: Carlos Score: 10    Carlos Phase II: Carlos Score: 10    Last vitals: Reviewed and per EMR flowsheets.        Anesthesia Post Evaluation    Patient location during evaluation: PACU  Patient participation: complete - patient participated  Level of consciousness: awake and alert  Airway patency: patent  Nausea & Vomiting: no nausea and no vomiting  Complications: no  Cardiovascular status: hemodynamically stable  Respiratory status: acceptable  Hydration status: stable
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
## 2021-01-11 NOTE — OP NOTE
[place] [address]  [phone]      1/11/2021    Patient name: Sera Bunch  YOB: 1965  MRN: [id number]    Allergies: Allergies   Allergen Reactions    Adhesive Tape     Ceftin [Cefuroxime Axetil]      Rash   10/27/17 severe rash    Inderal [Propranolol Hcl] Other (See Comments)     Not effective    Macrobid [Nitrofurantoin Monohydrate Macrocrystals] Nausea Only    Phenergan [Promethazine Hcl] Other (See Comments)     Overstim    Prozac [Fluoxetine Hcl] Other (See Comments)     sedation    Ultram [Tramadol Hcl] Itching, Nausea Only and Other (See Comments)     Not effective    Biaxin [Clarithromycin] Nausea And Vomiting    Cipro Xr Nausea And Vomiting    Darvocet [Propoxyphene N-Acetaminophen] Nausea And Vomiting    Sulfa Antibiotics Nausea And Vomiting    Sulfacetamide Sodium Nausea And Vomiting    Sulfamethoxazole-Trimethoprim Nausea Only and Rash       Pre-operative diagnosis:  Cataract Right Eye    Post-operative diagnosis:  Same    Procedure Performed:  Phacoemulsification with insertion of a foldable posterior chamber intraocular lens implant to the right eye. Anesthesia:  Topical Anesthesia with MAC.      Estimated Blood Loss: None    Specimens removed: None    Limbal Relaxing Incisions:  Axis:N/A    Arc Length: N/A    Complications:  None Description of Procedure: In the same day surgery center, the patient was given the usual pre-operative regimen. In the operating room suite, the right eye was prepped and draped in the usual sterile fashion. A paracentesis tract was fashioned, after which 0.5 MI of 1% preservative free Lidocaine was injected into the anterior chamber followed by Viscoat for a complete chamber fill. A clear cornea temporal tunnel was created using a keratome. Under optimal magnification and visualization, a continuous curvilinear capsulorrhexis was performed. Hydro-dissection of the lens was carried out using balanced salt solution. The lens was then phacoemulsified using the divide and conquer technique. Residual cortex was removed using the I/A handpiece followed by thorough posterior capsule polishing. The capsular bag was then filled with Provisc and the lens was gently delivered into the bag, achieving excellent centration. Provisc was removed using the I/A handpiece and the globe was pressurized using balanced salt solution. The paracentesis tract and the temporal wound were both checked and found to be watertight. The speculum was removed and Betadine 5% was instilled. The patient tolerated the procedure well and was taken to the same day surgery suite in stable condition. Post-operative instructions and follow-up care were arranged.        Electronically signed by: Lana Curz MD,1/11/2021,9:37 AM

## 2021-02-08 ENCOUNTER — HOSPITAL ENCOUNTER (OUTPATIENT)
Age: 56
Discharge: HOME | End: 2021-02-08
Payer: COMMERCIAL

## 2021-02-08 ENCOUNTER — HOSPITAL ENCOUNTER (OUTPATIENT)
Dept: GENERAL RADIOLOGY | Age: 56
Discharge: HOME | End: 2021-02-08
Payer: COMMERCIAL

## 2021-02-08 ENCOUNTER — OFFICE VISIT (OUTPATIENT)
Dept: FAMILY MEDICINE CLINIC | Age: 56
End: 2021-02-08
Payer: COMMERCIAL

## 2021-02-08 VITALS
WEIGHT: 161 LBS | OXYGEN SATURATION: 98 % | SYSTOLIC BLOOD PRESSURE: 138 MMHG | DIASTOLIC BLOOD PRESSURE: 90 MMHG | BODY MASS INDEX: 28.53 KG/M2 | HEART RATE: 70 BPM | TEMPERATURE: 97.5 F | HEIGHT: 63 IN

## 2021-02-08 DIAGNOSIS — M54.6 THORACIC SPINE PAIN: ICD-10-CM

## 2021-02-08 DIAGNOSIS — M54.6 THORACIC SPINE PAIN: Primary | ICD-10-CM

## 2021-02-08 PROCEDURE — 99213 OFFICE O/P EST LOW 20 MIN: CPT | Performed by: FAMILY MEDICINE

## 2021-02-08 PROCEDURE — 72070 X-RAY EXAM THORAC SPINE 2VWS: CPT

## 2021-02-08 PROCEDURE — 71101 X-RAY EXAM UNILAT RIBS/CHEST: CPT

## 2021-02-08 RX ORDER — LIDOCAINE 4 G/G
1 PATCH TOPICAL DAILY
Qty: 30 PATCH | Refills: 0 | Status: SHIPPED | OUTPATIENT
Start: 2021-02-08 | End: 2021-03-10

## 2021-02-08 ASSESSMENT — PATIENT HEALTH QUESTIONNAIRE - PHQ9
SUM OF ALL RESPONSES TO PHQ QUESTIONS 1-9: 0
SUM OF ALL RESPONSES TO PHQ9 QUESTIONS 1 & 2: 0
1. LITTLE INTEREST OR PLEASURE IN DOING THINGS: 0
SUM OF ALL RESPONSES TO PHQ QUESTIONS 1-9: 0

## 2021-02-08 NOTE — PROGRESS NOTES
2021    Blood pressure (!) 160/70, pulse 70, temperature 97.5 °F (36.4 °C), temperature source Temporal, height 5' 3\" (1.6 m), weight 161 lb (73 kg), SpO2 98 %, not currently breastfeeding. Gege Gregory (:  1965) is a 60 y.o. female, here for evaluation of the following medical concerns:    Chief Complaint   Patient presents with    Back Pain     back and sholder pain for 10 days     C/o thoracic back pain for past week and a half. No injury, but recalls picking up grandson out of a high chair. Pain is worst R paraspinal.  Sharp/knife like  Worse with certain sitting positions and laying down on back and on either side. Every night is painful. Worse with palpation, but felt better after  massaged it a little. No radiation  No pain with truncal motions or arm motions  Able to use elliptical machine without pain  No cough or SOB. Does not increase after eating  Hx gb removed years ago    Sx have improved over the past week. There is a separate discomfort anterior neck that is more with neck ROM. Started after her back pain, x 1-2 days. No pain with swallow. Treatment: heat, ibuprofen: helps some    Patient Active Problem List   Diagnosis    Plantar fasciitis    Menopausal syndrome    Seborrheic keratosis    Episodic mood disorder (HCC)    IBS (irritable bowel syndrome)    Asthma    Essential hypertension    GERD (gastroesophageal reflux disease)    Allergic rhinitis    Migraine with aura    Cervical dysplasia- S/P hyst, PAPs per gyn    Acquired hypothyroidism    Vitamin D deficiency    Chronic sinusitis    Osteopenia    Sacroiliac joint dysfunction of left side    Hyperlipidemia with target LDL less than 130    AK (actinic keratosis)    Biliary dyskinesia    Primary osteoarthritis of both knees    Hx of skin cancer, basal cell        Body mass index is 28.52 kg/m².     Wt Readings from Last 3 Encounters:   21 161 lb (73 kg) 01/11/21 150 lb (68 kg)   12/14/20 152 lb (68.9 kg)       BP Readings from Last 3 Encounters:   02/08/21 (!) 160/70   01/11/21 130/86   01/11/21 (!) 149/82       Allergies   Allergen Reactions    Adhesive Tape     Ceftin [Cefuroxime Axetil]      Rash   10/27/17 severe rash    Inderal [Propranolol Hcl] Other (See Comments)     Not effective    Macrobid [Nitrofurantoin Monohydrate Macrocrystals] Nausea Only    Phenergan [Promethazine Hcl] Other (See Comments)     Overstim    Prozac [Fluoxetine Hcl] Other (See Comments)     sedation    Ultram [Tramadol Hcl] Itching, Nausea Only and Other (See Comments)     Not effective    Biaxin [Clarithromycin] Nausea And Vomiting    Cipro Xr Nausea And Vomiting    Darvocet [Propoxyphene N-Acetaminophen] Nausea And Vomiting    Sulfa Antibiotics Nausea And Vomiting    Sulfacetamide Sodium Nausea And Vomiting    Sulfamethoxazole-Trimethoprim Nausea Only and Rash       Prior to Visit Medications    Medication Sig Taking?  Authorizing Provider   dicyclomine (BENTYL) 20 MG tablet TAKE 1 TABLET BY MOUTH THREE TIMES DAILY AS NEEDED Yes Nikole Millan MD   montelukast (SINGULAIR) 10 MG tablet TAKE 1 TABLET BY MOUTH EVERY NIGHT AT BEDTIME Yes Nikole Millan MD   levothyroxine (SYNTHROID) 100 MCG tablet TAKE 1 TABLET BY MOUTH DAILY Yes Nikole Millan MD   metoprolol succinate (TOPROL XL) 25 MG extended release tablet Take 1 tablet by mouth daily Yes Nikole Millan MD   pantoprazole (PROTONIX) 40 MG tablet TAKE 1 TABLET BY MOUTH TWICE DAILY Yes Nikole Millan MD   valACYclovir (VALTREX) 500 MG tablet Take 500 mg by mouth Yes Historical Provider, MD   SUMAtriptan (IMITREX) 20 MG/ACT nasal spray USE 1 SPRAY NASALLY DAILY AS NEEDED FOR MIGRAINE Yes Nikole Millan MD   Blood Pressure Monitor KIT Daily as needed Yes Nikole Millan MD   calcium carbonate (OSCAL) 500 MG TABS tablet Take 500 mg by mouth daily Yes Historical Provider, MD fexofenadine (ALLEGRA) 180 MG tablet Take 1 tablet by mouth daily. Yes Amarjit Vanegas MD   VITAMIN D, CHOLECALCIFEROL, PO Take 5,000 Units by mouth daily  Yes Historical Provider, MD   butalbital-aspirin-caffeine (FIORINAL) -40 MG capsule TAKE 1 CAPSULE BY MOUTH EVERY 6 HOURS AS NEEDED FOR HEADACHES. MAX 40 PER MONTH  Amarjit Vanegas MD   fluticasone (FLONASE) 50 MCG/ACT nasal spray 1 spray by Nasal route daily. Patient not taking: Reported on 2/8/2021  Amarjit Vanegas MD        Social History     Tobacco Use    Smoking status: Never Smoker    Smokeless tobacco: Never Used   Substance Use Topics    Alcohol use: Yes     Alcohol/week: 0.0 standard drinks     Comment: rare    Drug use: No       Review of Systems As above     Physical Exam  Vitals signs and nursing note reviewed. Constitutional:       Appearance: Normal appearance. She is well-developed. Neck:      Musculoskeletal: Normal range of motion and neck supple. Thyroid: No thyromegaly. Vascular: No carotid bruit. Cardiovascular:      Rate and Rhythm: Normal rate and regular rhythm. Pulses: Normal pulses. Heart sounds: Normal heart sounds. No murmur. Pulmonary:      Effort: Pulmonary effort is normal. No respiratory distress. Breath sounds: Normal breath sounds. No wheezing or rales. Abdominal:      General: Abdomen is flat. Bowel sounds are normal.      Tenderness: There is abdominal tenderness (diffuse abd tenderness (hx ibs)). Musculoskeletal: Normal range of motion. Right lower leg: No edema. Left lower leg: No edema. Comments: FROM c and t spine and shoulders  Tenderness to palpation thoracic Paraspinous area and Paraspinous rhomboid musculature. Mild tenderness@ manubrium and sternoclavicular joints without marked swelling or redness. Skin:     General: Skin is warm and dry. Neurological:      General: No focal deficit present. Mental Status: She is alert and oriented to person, place, and time. Mental status is at baseline. Psychiatric:         Mood and Affect: Mood normal.         Behavior: Behavior normal.         Thought Content: Thought content normal.         Judgment: Judgment normal.         ASSESSMENT/PLAN:     Diagnosis Orders   1. Thoracic spine pain  XR THORACIC SPINE (2 VIEWS)    XR RIBS RIGHT (2 VIEWS)     Cause not clear, but suspect rib facet subluxation/strain based on exam and what triggers her pain. May also just be muscular strain. Assess bony abnormalities with x ray. Recommend otc meds, avoidance triggering activities, a little more time. Follow up: report to PCP if no improvement in 2 weeks. Her anterior neck pain appears to be related to bone/cartilage of anterior upper chest wall. NSAIDs and heat is advised    Her diffuse abd pain is typical for IBS and she was encouraged to use fiber daily. [facility]  [facility] was used to authenticate this note.     --Eliana Goodson MD on 2/8/2021 at 3:39 PM

## 2021-02-10 ENCOUNTER — TELEPHONE (OUTPATIENT)
Dept: FAMILY MEDICINE CLINIC | Age: 56
End: 2021-02-10

## 2021-02-10 NOTE — TELEPHONE ENCOUNTER
I spoke to patient and advised of results .  Patient said you discussed a muscle relaxer at her appointment and she is asking if she can try that

## 2021-02-11 RX ORDER — TIZANIDINE 4 MG/1
4 TABLET ORAL EVERY 8 HOURS PRN
Qty: 20 TABLET | Refills: 0 | Status: SHIPPED | OUTPATIENT
Start: 2021-02-11

## 2021-02-11 NOTE — TELEPHONE ENCOUNTER
That's fine. I called in zanaflex for her. It can be sedating. Best to use it at bedtime or if she will not be driving for the next 6-8 hours. [address] was the pharmacy. Thanks.

## 2021-03-08 DIAGNOSIS — G43.111 INTRACTABLE MIGRAINE WITH AURA WITH STATUS MIGRAINOSUS: ICD-10-CM

## 2021-03-08 RX ORDER — METOPROLOL SUCCINATE 25 MG/1
25 TABLET, EXTENDED RELEASE ORAL DAILY
Qty: 90 TABLET | Refills: 1 | Status: SHIPPED | OUTPATIENT
Start: 2021-03-08 | End: 2021-09-16

## 2021-03-22 ENCOUNTER — TELEPHONE (OUTPATIENT)
Dept: FAMILY MEDICINE CLINIC | Age: 56
End: 2021-03-22

## 2021-03-22 DIAGNOSIS — E78.5 HYPERLIPIDEMIA WITH TARGET LDL LESS THAN 130: ICD-10-CM

## 2021-03-22 DIAGNOSIS — E03.9 ACQUIRED HYPOTHYROIDISM: Primary | ICD-10-CM

## 2021-03-22 DIAGNOSIS — I10 ESSENTIAL HYPERTENSION: ICD-10-CM

## 2021-03-22 NOTE — TELEPHONE ENCOUNTER
Pt has a appointment on 4/21/21 wants to know can the labs be put in so she can get this done?  She stated she is past due       Please advise

## 2021-03-23 DIAGNOSIS — J30.9 ALLERGIC RHINITIS, UNSPECIFIED SEASONALITY, UNSPECIFIED TRIGGER: ICD-10-CM

## 2021-03-23 RX ORDER — MONTELUKAST SODIUM 10 MG/1
TABLET ORAL
Qty: 90 TABLET | Refills: 0 | Status: SHIPPED | OUTPATIENT
Start: 2021-03-23 | End: 2021-05-27

## 2021-03-23 NOTE — TELEPHONE ENCOUNTER
I called the pt advised the labs are there and to go before her appointment the pt insisted on moving the appointment up sooner she stated there is something wrong with her thyroid I advised I have nothing open       Please advise

## 2021-03-23 NOTE — TELEPHONE ENCOUNTER
Called pt and she states she has to get blood work done this week that it is the only time she has. Advised pt that if she has to then she has to get them done.  And we will watch for the results and we will see her at her appt

## 2021-03-23 NOTE — TELEPHONE ENCOUNTER
Tell pt that her labs are in the system to have them done a few days before her visit and she needs to be fasting only water and black coffee

## 2021-03-25 DIAGNOSIS — E78.5 HYPERLIPIDEMIA WITH TARGET LDL LESS THAN 130: ICD-10-CM

## 2021-03-25 DIAGNOSIS — E03.9 ACQUIRED HYPOTHYROIDISM: ICD-10-CM

## 2021-03-25 DIAGNOSIS — I10 ESSENTIAL HYPERTENSION: ICD-10-CM

## 2021-03-25 LAB
A/G RATIO: 1.3 (ref 1.1–2.2)
ALBUMIN SERPL-MCNC: 3.8 G/DL (ref 3.4–5)
ALP BLD-CCNC: 78 U/L (ref 40–129)
ALT SERPL-CCNC: 6 U/L (ref 10–40)
ANION GAP SERPL CALCULATED.3IONS-SCNC: 12 MMOL/L (ref 3–16)
AST SERPL-CCNC: 15 U/L (ref 15–37)
BILIRUB SERPL-MCNC: 0.3 MG/DL (ref 0–1)
BUN BLDV-MCNC: 13 MG/DL (ref 7–20)
CALCIUM SERPL-MCNC: 9.3 MG/DL (ref 8.3–10.6)
CHLORIDE BLD-SCNC: 104 MMOL/L (ref 99–110)
CHOLESTEROL, TOTAL: 204 MG/DL (ref 0–199)
CO2: 25 MMOL/L (ref 21–32)
CREAT SERPL-MCNC: 0.8 MG/DL (ref 0.6–1.1)
GFR AFRICAN AMERICAN: >60
GFR NON-AFRICAN AMERICAN: >60
GLOBULIN: 3 G/DL
GLUCOSE BLD-MCNC: 94 MG/DL (ref 70–99)
HDLC SERPL-MCNC: 51 MG/DL (ref 40–60)
LDL CHOLESTEROL CALCULATED: 138 MG/DL
POTASSIUM SERPL-SCNC: 4.1 MMOL/L (ref 3.5–5.1)
SODIUM BLD-SCNC: 141 MMOL/L (ref 136–145)
TOTAL PROTEIN: 6.8 G/DL (ref 6.4–8.2)
TRIGL SERPL-MCNC: 77 MG/DL (ref 0–150)
TSH SERPL DL<=0.05 MIU/L-ACNC: 1.77 UIU/ML (ref 0.27–4.2)
VLDLC SERPL CALC-MCNC: 15 MG/DL

## 2021-03-26 ENCOUNTER — TELEPHONE (OUTPATIENT)
Dept: FAMILY MEDICINE CLINIC | Age: 56
End: 2021-03-26

## 2021-03-26 DIAGNOSIS — E03.9 ACQUIRED HYPOTHYROIDISM: ICD-10-CM

## 2021-03-26 RX ORDER — LEVOTHYROXINE SODIUM 0.1 MG/1
100 TABLET ORAL DAILY
Qty: 90 TABLET | Refills: 0 | Status: SHIPPED | OUTPATIENT
Start: 2021-03-26 | End: 2021-06-23

## 2021-03-26 NOTE — TELEPHONE ENCOUNTER
Pt states her tsh is too low for her the last time it got this low she increased it. She said her tsh is low and it needs to be adjusted.   Please advise

## 2021-03-26 NOTE — TELEPHONE ENCOUNTER
Called pt and explained to her below. Offered sooner apt with dr. John Lin and pt wanted it to be her cpe. Tried to explain to her that a cpe is not to discuss a problem. So I was trying to set her up on 4/5 for a problem visit. She was very rude and argued with me what a cpe was for. Declined to come in on the 5th to discuss her issues. Said she was sick of our bull. ... I ended up asking her if there was anything else I can do for her and she said no and I disconnected call.

## 2021-03-26 NOTE — TELEPHONE ENCOUNTER
Patient called stating received thyroid results  Patient asking if the dosage can be raised for levothyroxine. Patient stating only has a couple days left of medication and would like higher dosage called in. Are we able to do this before appt? Patient stating cannot wake up in the morning , holding water, miserable and wants medication changed before next appt? Pharmacy verified [employer] drug [facility].       Please advise

## 2021-04-16 ENCOUNTER — TELEPHONE (OUTPATIENT)
Dept: FAMILY MEDICINE CLINIC | Age: 56
End: 2021-04-16

## 2021-04-16 RX ORDER — FLUCONAZOLE 150 MG/1
150 TABLET ORAL ONCE
Qty: 2 TABLET | Refills: 0 | Status: SHIPPED | OUTPATIENT
Start: 2021-04-16 | End: 2021-04-16

## 2021-04-21 ENCOUNTER — OFFICE VISIT (OUTPATIENT)
Dept: FAMILY MEDICINE CLINIC | Age: 56
End: 2021-04-21
Payer: COMMERCIAL

## 2021-04-21 VITALS
WEIGHT: 159 LBS | DIASTOLIC BLOOD PRESSURE: 86 MMHG | HEIGHT: 64 IN | SYSTOLIC BLOOD PRESSURE: 130 MMHG | HEART RATE: 87 BPM | OXYGEN SATURATION: 99 % | BODY MASS INDEX: 27.14 KG/M2

## 2021-04-21 DIAGNOSIS — E55.9 VITAMIN D DEFICIENCY: ICD-10-CM

## 2021-04-21 DIAGNOSIS — Z85.828 HX OF SKIN CANCER, BASAL CELL: ICD-10-CM

## 2021-04-21 DIAGNOSIS — I10 ESSENTIAL HYPERTENSION: ICD-10-CM

## 2021-04-21 DIAGNOSIS — Z00.00 WELL ADULT HEALTH CHECK: Primary | ICD-10-CM

## 2021-04-21 DIAGNOSIS — E78.5 HYPERLIPIDEMIA WITH TARGET LDL LESS THAN 130: ICD-10-CM

## 2021-04-21 DIAGNOSIS — N95.1 MENOPAUSAL SYNDROME: ICD-10-CM

## 2021-04-21 DIAGNOSIS — M85.80 OSTEOPENIA, UNSPECIFIED LOCATION: ICD-10-CM

## 2021-04-21 DIAGNOSIS — E03.9 ACQUIRED HYPOTHYROIDISM: ICD-10-CM

## 2021-04-21 DIAGNOSIS — G43.109 MIGRAINE WITH AURA AND WITHOUT STATUS MIGRAINOSUS, NOT INTRACTABLE: ICD-10-CM

## 2021-04-21 PROCEDURE — 99396 PREV VISIT EST AGE 40-64: CPT | Performed by: FAMILY MEDICINE

## 2021-04-21 RX ORDER — VENLAFAXINE HYDROCHLORIDE 37.5 MG/1
CAPSULE, EXTENDED RELEASE ORAL
COMMUNITY
Start: 2021-03-18 | End: 2021-10-28

## 2021-04-21 RX ORDER — ALBUTEROL SULFATE 90 UG/1
2 AEROSOL, METERED RESPIRATORY (INHALATION) EVERY 4 HOURS PRN
Qty: 1 INHALER | Refills: 3 | Status: SHIPPED | OUTPATIENT
Start: 2021-04-21

## 2021-04-21 RX ORDER — CONJUGATED ESTROGENS 0.62 MG/G
0.5 CREAM VAGINAL DAILY
Qty: 1 TUBE | Refills: 3 | Status: SHIPPED | OUTPATIENT
Start: 2021-04-21

## 2021-04-21 NOTE — PATIENT INSTRUCTIONS
INSTRUCTIONS  · NEXT APPOINTMENT: Please schedule fasting check-up in 6 months. OK to have water, black coffee and medications. · PLEASE TAKE THIS FORM TO CHECK-OUT WINDOW TO SCHEDULE NEXT VISIT. · Please get flu vaccine when available in fall. Can get either at this office or at stores such as [employer] and [employer]. · Reduce saturated fat. · OK to try the venlafaxine low dose. Try for 2 months at least.  · Plan shingles next visit. Can call to get sooner if you wish. Patient Education       DIET TO LOWER CHOLESTEROL    Choose Healthy Foods  A healthy heart needs a healthy diet. The Dietary Guidelines for  Americans offers two examples of eating plans to choose from, and  also includes advice for overall health and food safety. These guidelines  encourage you to:  ? Choose a variety of grains daily; half of your daily grains  should come from whole grains. ? Choose a variety of fruits and vegetables daily. ? Choose a diet that is low in saturated fat, trans fat, and cholesterol. ? Choose foods and beverages that are low in added sugar. ? Choose and prepare foods with little salt. ? If you drink alcoholic beverages, do so in moderation. ? Aim for a healthy weight. ? Be physically active most days. ? Balance the calories you take in with the calories you expend  through physical activity. ? Keep foods safe to eat. Trans Fats? Trans fats, or trans fatty acids, are another type of dietary fat  that raises LDL cholesterol. They are formed when vegetable oil  is hardened to become margarine or shortening in a process  called hydrogenation. The harder the margarine or shortening,  the more likely it is to contain more trans fat. To reduce trans  fats in your diet, read food labels and buy fewer products that  list hydrogenated oil or partially hydrogenated oil as an  ingredient. When possible, choose margarines that list liquid  vegetable oil as the first ingredient.  The main sources of trans  fat are foods made with hydrogenated oils such as some  margarines, shortenings, cookies, crackers, cakes, pies, snack  foods, and fried foods. Food Group Serving Sizes Examples  Now Youre Cooking: Limiting Saturated Fat, Trans Fat, and Cholesterol  Planning and preparing nutritious meals may take a little extra  effort, but the health benefits are huge. Here are some tips for cutting  down on saturated fat, trans fat, and dietary cholesterol, which  will help lower your LDL cholesterol and reduce your heart disease  risk. It will improve heart health for everyone, and may be particularly  helpful to those following the TLC eating plan. Meat, Poultry, and Fish  ? Choose fish, poultry, and lean cuts of meat. Trim the fat from  meats; remove the skin and fat from chicken. Keep portion  sizes moderate. Mineral Medicine: Another Way To Control Blood Pressure  Certain mineral-rich foods can help keep blood pressure levels  healthy. For example, a diet rich in potassium can help to both  prevent and control high blood pressure. A potassium-rich diet  not only blunts the effects of salt on blood pressure, but may  also reduce the risk of developing kidney stones, and possibly  decrease bone loss with age. But be sure to get your potassium  from food sources, not from supplements. Many fruits and vegetables,  some dairy foods, and fish are rich sources of potassium. Calcium and magnesium are two other minerals that may help to  prevent high blood pressure, as well as improve health in other  ways. Low-fat or fat-free milk and milk products are rich sources  of calcium, while magnesium is found in many whole-grain  products; dark green, leafy vegetables; fish; and dry beans. Mineral Medicine  ? Broil, bake, roast, or poach instead of frying. When you do [place],  use a nonstick pan and a nonstick cooking spray or a very small  amount of oil or margarine.   ? Cut down on sausage, oliveira, and processed, high-fat cold cuts  (which are also high in sodium). Milk Products and Eggs  ? Instead of whole milk or cream, use fat-free or 1-percent milk. ? Use fat-free or low-fat cheeses and yogurt. ? Replace ice cream with sorbet, sherbet, and fat-free or low-fat  frozen yogurt. Keep portion sizes moderate. ? Limit the number of egg yolks you eat. Egg whites contain no  fat or cholesterol, so you can eat them often. In most recipes,  you can substitute two egg whites for one whole egg. ? Use soft margarines (liquid or tub types) that contain little or  no trans fat. Some brands of soft margarines are high in plant  sterols or stanols, which lower LDL cholesterol. Grains and Grain Products  ? Eat foods with lots of fiber and nutrients and make sure that  half of your grain products are whole grain. These include  whole-grain breads, pasta, and cereals, as well as brown rice. When you check package labels, look for the word whole in  the ingredients. Make sure that whole grains appear among the  first items listed. Sauces, Soups, and Casseroles  ? After making sauces or soups, cool them in the refrigerator and  skim the fat from the top. Do the same with canned soups. Little TLC  If your LDL cholesterol is above your goal level,  you should start on the TLC eating plan right away. Consuming  foods that are high in saturated fat, trans fat, and cholesterol  contributes to high levels of LDL cholesterol. On the TLC eating  plan, you should consume:  ? Less than 7 percent of the days total calories from saturated fat. Lowering saturated fat is the most important dietary change for  reducing blood cholesterol. ? Less than 200 milligrams of dietary cholesterol per day. ? Just enough calories to achieve or maintain a healthy weight.   If your blood cholesterol is not lowered enough on the TLC  eating plan, your doctor or registered dietitian may advise you  to increase the amount of soluble fiber and/or add cholesterollowering food products to your diet. These products include  margarines that contain plant sterols or plant stanol esters,  which are ingredients that lower LDL cholesterol. If your LDL  level is still not lowered enough, your doctor may prescribe a  cholesterol-lowering drug along with the TLC eating plan. For  more information, see [employer]s Web page, [employer], Live  Longer.  (See To Learn More on page 88.) TLC  56  ? Thicken a low-fat sauce with cornstarch or flour. ? Make main dishes with whole-grain pasta, brown rice, or dry  peas and beans. If you add meat, use small pieces for flavoring  rather than as the main ingredient. When You Cant Face Cooking  Check the Nutrition Facts label on food packages to choose frozen  dinners and pizzas that are lowest in saturated fat, trans fat, and  cholesterol. Also watch the calories and sodium content. Make sure  the dinners include vegetables, fruits, and whole grainsor add  them on the side. Choose store-bought baked goods that are lowest in saturated fat,  cholesterol, trans fats, and hydrogenated (hardened) fats. Also,  remember that even no cholesterol and fat-free baked goods still  may be high in calories. Dining Out for Health  With a little planning and a willingness to speak up you can eat  healthfully when you dine out. Here are some tips. Ask for what you want. Most restaurants will honor your requests. You have nothing to lose by asking! Order small. To reduce portion size, try ordering appetizers or  childrens portions as your main meal. Or, take half of your entrée  home with you for lunch the next day. Ask questions. Dont hesitate to ask your  how foods are  prepared and whether the restaurant will make substitutions. Ask if they will:  ? Serve low-fat or fat-free milk rather than whole milk or cream.  ? Tell you the type of cooking oil used.  (Preferred types that are  lower in saturated fat are canola, safflower, sunflower, corn,  and olive oils.)  ? Trim the fat off poultry or meat. ? Leave all butter, gravy, and sauces off an entrée or side dish. ? Add no salt during cooking. ? Serve salad dressing on the side. ? Meet special requests if you make them in advance. Your Guide to a Healthy Heart    Select foods cooked by low-fat methods. Look for terms such as  broiled, baked, roasted, poached, or lightly sautéed. Limit foods high in calories and fats, especially saturated fat and  trans fat. Watch out for terms such as fried, crispy, creamed, escalloped,  hollandaise, bernaise, casserole, and pastry crust.    Make Healthy Choices  ? Breakfast: Fresh fruit, small glass of citrus juice, low-fat or fatfree  milk and yogurt, whole-grain bread products and cereals,  omelet made with egg whites or egg substitute. ? Beverages: Water with lemon, flavored sparkling water, juice  spritzer (half fruit juice and half sparkling water), iced tea,  reduced-sodium tomato juice. ? Breads: Most yeast breads are low in calories and fatas long  as you limit the butter, margarine, or olive oil. Choose wholegrain  breads, which are packed with important nutrients and  are full of fiber to make you feel ramires faster. Also, watch the  sodium content. ? Appetizers: Steamed seafood, fresh fruit, bean soups, salad  with reduced-fat dressing. ? Entrées: Skinless poultry, fish, shellfish, vegetable dishes, or  pasta with red sauce or vegetables. Limit your use of butter,  margarine, and salt at the table. ? Salads: Fresh lettuce, spinach, and other greens; other fresh vegetables, chickpeas,  and kidney beans. Skip high-fat and high-calorie nonvegetable choices  such as deli meats, oliveira, egg, cheese, and croutons. Choose  lower-calorie, reduced-fat, or fat-free dressings, lemon  juice, or vinegar. ? Side Dishes: Vegetables and grain products, including whole-grain  rice or noodles.  Ask for salsa or low-fat yogurt instead of sour cream or butter. ? Dessert: Fresh fruit; fat-free frozen yogurt, sherbet, or fruit  sorbet (usually fat free, but ask for the calorie content). Try  sharing a dessert. If you drink coffee or tea with dessert, ask  for low-fat or fat-free milk instead of cream or half-and-half. Know your foods. Following are some additional tips on shopping,  cooking, and eating for heart health:  ?   How To Tame a Snack Attack  Many snacks, including many types of cookies, crackers, and chips,  are high in saturated fat, trans fat, cholesterol, sodium, and calories. But that doesnt mean you have to cut out all between-meal treats. Keep the foods listed below on hand for snack attacks. But, keep in  mind that while these foods may be low in fat, many are not low in  calories. So watch how much you eat, especially if you are trying to  control your weight. Here are some healthier, low-fat snacks:  ? 100-percent fruit juices  ? Low-fat or fat-free milk  ? Fat-free frozen yogurt,  sherbet, and sorbet  ? Low-fat cookies such as animal crackers, brittnee crackers, donny snaps, and  fig bars  ? Low-fat crackers such as rosa toast, or rice, rye, and soda crackers;  look for unsalted or low-sodium types    Lowdown on Labels  Food labels can help you choose items that are lower in sodium,  saturated and total fat, trans fat, cholesterol, and calories. When  you grocery shop, look for these claims on cans and other packaging  and use this guide to find out what each claim really means.   Sodium Claims What They Mean  Sodium free or salt free Less than 5 mg of sodium per serving  Very low sodium 35 mg or less per serving  Low sodium 140 mg or less per serving  Low sodium meal 140 mg or less per 31/2 ounces  Reduced or less sodium At least 25 percent less per serving than the regular version  Unsalted or no salt added No salt added during processing  Fat Claims What They Mean  Fat free Less than 1/2 gram of fat per serving  Low saturated fat 1 gram or less of saturated fat per serving  Reduced fat At least 25 percent less fat per serving than the regular version  Light 50 percent less fat than the regular version  Calorie Claims What They Mean  Calorie free Less than 5 calories per serving  Low calorie 40 calories or less per serving  Reduced or less calories At least 25 percent fewer calories per serving than the regular version  Light or lite Half the fat or one-third of the calories per serving of the  regular version    Figuring Out Fat  Your personal fat allowance depends on how many calories you  consume each day. If you do not have high blood cholesterol or  heart disease, the saturated fat in your diet should be less than  10 percent of your daily calories, and total fat should be 2035  percent of calories. Most fats should come from foods that are  high in polyunsaturated fats and monosaturated fats, such as  fish, nuts, and vegetable oils. The table below shows the maximum amount of saturated fat  you should eat, depending on how many calories you take in  each day. If you have high blood cholesterol or heart disease,  the amount of saturated fat will be different. (See Give Your  Heart a Little TLC, on page 55.) Check the Nutrition Facts  label on food packages to find out the number of fat grams  both saturated and totalin each serving. Total Calorie Intake Limit on Saturated Fat Intake  1,600 18 g or less  2,000* 20 g or less  2,200 24 g or less  2,500* 25 g or less  2,800 31 g or less  * Percent daily values on the Nutrition Facts label on  the food package are based on a 2,000-calorie diet. Values for 2,000 and 2,500 calories are rounded to the  nearest 5 grams to be consistent with the Nutrition Facts  label. ? Fresh or dried fruit, or fruits canned in their own juice  ? Vegetable sticks; try a dab of reduced-fat peanut butter on  celery sticks  ?  Air-popped popcorn with no salt or butter; fat-free, lowsodium  pretzels

## 2021-04-21 NOTE — PROGRESS NOTES
PHYSICAL-VISIT NOTE   Subjective:     Chief Complaint   Patient presents with    Annual Exam     concerned about not losing weight    Other     hot flashes/ night sweats, questions about effexor medication       Yesy Briones is a 60 y.o. female who presents for annual testing/preventive review and check-up of medical problems listed below:  1. Well adult health check    2. Acquired hypothyroidism    3. Essential hypertension    4. Migraine with aura and without status migrainosus, not intractable    5. Vitamin D deficiency    6. Hx of skin cancer, basal cell    7. Hyperlipidemia with target LDL less than 130    8. Osteopenia, unspecified location    9. Menopausal syndrome        Complaints: tooth pain, seeing dentist today  Asthma really good. Rescue inhaler . Recent BCC again L forearm  Migraines worse with perimenopause, tooth, season. Sticking to Weight Watcher points but making bad choices. ROS  See scanned \"Annual Adult Health Checklist\". Pertinent positives addressed above. HISTORY:  Patient's medications, allergies, past medical, and social histories were reviewed and updated as appropriate.      CHART REVIEW  Health Maintenance   Topic Date Due    Shingles Vaccine (1 of 2) Never done    TSH testing  2022    Potassium monitoring  2022    Creatinine monitoring  2022    Breast cancer screen  2023    DTaP/Tdap/Td vaccine (3 - Td) 2025    Colon cancer screen colonoscopy  12/15/2025    Lipid screen  2026    Flu vaccine  Completed    Pneumococcal 0-64 years Vaccine  Completed    COVID-19 Vaccine  Completed    Hepatitis C screen  Completed    HIV screen  Completed    Hepatitis A vaccine  Aged Out    Hepatitis B vaccine  Aged Out    Hib vaccine  Aged Out    Meningococcal (ACWY) vaccine  Aged Out     The 10-year ASCVD risk score (Tarsha Jc, et al., 2013) is: 2.7%    Values used to calculate the score:      Age: 60 years      Sex: Female      Is La David MD   Blood Pressure Monitor KIT Daily as needed  Patient not taking: Reported on 4/21/2021  Rylee Roy MD      Family History   Problem Relation Age of Onset    High Blood Pressure Mother     Cancer Mother         BREAST    Dementia Mother     Early Death Father 35        accidental overdose    Substance Abuse Maternal Grandmother         ALCOHOL    Heart Disease Maternal Grandmother     High Blood Pressure Maternal Grandfather      Social History     Tobacco Use    Smoking status: Never Smoker    Smokeless tobacco: Never Used   Substance Use Topics    Alcohol use:  Yes     Alcohol/week: 0.0 standard drinks     Comment: rare    Drug use: No      LAST LABS  Cholesterol, Total   Date Value Ref Range Status   03/25/2021 204 (H) 0 - 199 mg/dL Final     LDL Calculated   Date Value Ref Range Status   03/25/2021 138 (H) <100 mg/dL Final     HDL   Date Value Ref Range Status   03/25/2021 51 40 - 60 mg/dL Final     Triglycerides   Date Value Ref Range Status   03/25/2021 77 0 - 150 mg/dL Final     Lab Results   Component Value Date    GLUCOSE 94 03/25/2021     Lab Results   Component Value Date     03/25/2021    K 4.1 03/25/2021    CREATININE 0.8 03/25/2021     Lab Results   Component Value Date    WBC 11.7 (H) 02/09/2018    HGB 14.5 02/09/2018    HCT 43.4 02/09/2018    MCV 91.4 02/09/2018     02/09/2018     Lab Results   Component Value Date    ALT 6 (L) 03/25/2021    AST 15 03/25/2021    ALKPHOS 78 03/25/2021    BILITOT 0.3 03/25/2021     TSH (uIU/mL)   Date Value   03/25/2021 1.77     Lab Results   Component Value Date    LABA1C 5.5 05/26/2017     Objective:   PHYSICAL EXAM   /86   Pulse 87   Ht 5' 4\" (1.626 m)   Wt 159 lb (72.1 kg)   SpO2 99%   BMI 27.29 kg/m²   BP Readings from Last 5 Encounters:   04/21/21 130/86   02/08/21 (!) 138/90   01/11/21 130/86   01/11/21 (!) 149/82   12/14/20 (!) 141/90     Wt Readings from Last 5 Encounters:   04/21/21 159 lb (72.1 kg)

## 2021-05-07 ENCOUNTER — HOSPITAL ENCOUNTER (EMERGENCY)
Age: 56
Discharge: HOME | End: 2021-05-07
Payer: COMMERCIAL

## 2021-05-07 VITALS
HEART RATE: 76 BPM | DIASTOLIC BLOOD PRESSURE: 99 MMHG | TEMPERATURE: 98.1 F | SYSTOLIC BLOOD PRESSURE: 149 MMHG | BODY MASS INDEX: 26.58 KG/M2 | OXYGEN SATURATION: 99 % | HEIGHT: 63 IN | WEIGHT: 150 LBS | RESPIRATION RATE: 18 BRPM

## 2021-05-07 DIAGNOSIS — S66.911A STRAIN OF RIGHT WRIST, INITIAL ENCOUNTER: ICD-10-CM
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
## 2021-05-07 DIAGNOSIS — S39.012A STRAIN OF LUMBAR REGION, INITIAL ENCOUNTER: ICD-10-CM

## 2021-05-07 DIAGNOSIS — S16.1XXA STRAIN OF NECK MUSCLE, INITIAL ENCOUNTER: ICD-10-CM

## 2021-05-07 DIAGNOSIS — S66.912A WRIST STRAIN, LEFT, INITIAL ENCOUNTER: ICD-10-CM

## 2021-05-07 PROCEDURE — 99284 EMERGENCY DEPT VISIT MOD MDM: CPT

## 2021-05-07 PROCEDURE — 6370000000 HC RX 637 (ALT 250 FOR IP): Performed by: PHYSICIAN ASSISTANT

## 2021-05-07 RX ORDER — CYCLOBENZAPRINE HCL 5 MG
5-10 TABLET ORAL 3 TIMES DAILY PRN
Qty: 15 TABLET | Refills: 0 | Status: SHIPPED | OUTPATIENT
Start: 2021-05-07 | End: 2021-05-17

## 2021-05-07 RX ORDER — BUTALBITAL, ACETAMINOPHEN AND CAFFEINE 50; 325; 40 MG/1; MG/1; MG/1
1 TABLET ORAL EVERY 4 HOURS PRN
Status: DISCONTINUED | OUTPATIENT
Start: 2021-05-07 | End: 2021-05-07 | Stop reason: HOSPADM

## 2021-05-07 RX ORDER — BUTALBITAL, ASPIRIN, AND CAFFEINE 325; 50; 40 MG/1; MG/1; MG/1
1 CAPSULE ORAL EVERY 4 HOURS PRN
Status: DISCONTINUED | OUTPATIENT
Start: 2021-05-07 | End: 2021-05-07 | Stop reason: ALTCHOICE

## 2021-05-07 RX ADMIN — BUTALBITAL, ACETAMINOPHEN, AND CAFFEINE 1 TABLET: 50; 325; 40 TABLET ORAL at 18:20

## 2021-05-07 ASSESSMENT — ENCOUNTER SYMPTOMS
BACK PAIN: 1
CHEST TIGHTNESS: 0
ABDOMINAL PAIN: 0
SHORTNESS OF BREATH: 0
NAUSEA: 0
VOMITING: 0

## 2021-05-07 ASSESSMENT — PAIN SCALES - GENERAL
PAINLEVEL_OUTOF10: 8
PAINLEVEL_OUTOF10: 7

## 2021-05-07 ASSESSMENT — PAIN DESCRIPTION - ORIENTATION: ORIENTATION: RIGHT;LEFT

## 2021-05-07 ASSESSMENT — PAIN DESCRIPTION - PAIN TYPE: TYPE: ACUTE PAIN

## 2021-05-07 ASSESSMENT — PAIN DESCRIPTION - LOCATION: LOCATION: HEAD

## 2021-05-07 ASSESSMENT — PAIN DESCRIPTION - DESCRIPTORS: DESCRIPTORS: CONSTANT

## 2021-05-07 ASSESSMENT — PAIN DESCRIPTION - FREQUENCY
FREQUENCY: CONTINUOUS
FREQUENCY: CONTINUOUS

## 2021-05-07 NOTE — ED NOTES
D/C: Order noted for d/c. Pt confirmed d/c paperwork & 1 RX have correct name. Discharge and education instructions reviewed with patient. Teach-back successful. Pt verbalized understanding and signed d/c papers. Pt denied questions at this time. No acute distress noted. Patient instructed to follow-up as noted - return to emergency department if symptoms worsen. Patient verbalized understanding. Discharged per EDMD with discharge instructions. Pt discharged to private vehicle. Patient stable upon departure. Thanked patient for choosing [facility] for care. Provider aware of patient pain at time of discharge.        Tyler Larios RN  05/07/21 [id number]
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
## 2021-06-18 ENCOUNTER — TELEPHONE (OUTPATIENT)
Dept: FAMILY MEDICINE CLINIC | Age: 56
End: 2021-06-18

## 2021-06-18 NOTE — TELEPHONE ENCOUNTER
Pt calling stating she has been having side effects like she can not wake up being on venlafaxine. Stated the rx is not working and she is still having night sweats. Stated she would like take off medication but found that is is dangerous to completely stop taking. Pt stated that she can be reached anytime at [phone] and can leave a voicemail.

## 2021-06-18 NOTE — TELEPHONE ENCOUNTER
She is on a low dose  Just take every other day for a week then every third day for  1-2 weeks then stop

## 2021-06-18 NOTE — TELEPHONE ENCOUNTER
Patient has been on this for 2 months. It was prescribed by gynecology in March but she did not start taking until April after she discussed with Dr. Jenna Lo who documented for patient to try for at least 2 months. Patient states it has not helped her hot flashes at all and it just makes her feel weird. She is asking if she can stop it suddenly or does she have to titrate down. I did advise she should call gynecology since they prescribed but she said she doesn't know if she can get in touch with anybody there.

## 2021-06-23 DIAGNOSIS — E03.9 ACQUIRED HYPOTHYROIDISM: ICD-10-CM

## 2021-06-23 RX ORDER — LEVOTHYROXINE SODIUM 0.1 MG/1
100 TABLET ORAL DAILY
Qty: 90 TABLET | Refills: 1 | Status: SHIPPED | OUTPATIENT
Start: 2021-06-23 | End: 2021-10-28 | Stop reason: SDUPTHER

## 2021-07-15 DIAGNOSIS — G43.111 INTRACTABLE MIGRAINE WITH AURA WITH STATUS MIGRAINOSUS: ICD-10-CM

## 2021-07-15 RX ORDER — BUTALBITAL, ASPIRIN, AND CAFFEINE 325; 50; 40 MG/1; MG/1; MG/1
CAPSULE ORAL
Qty: 40 CAPSULE | Refills: 2 | Status: SHIPPED | OUTPATIENT
Start: 2021-07-15 | End: 2022-01-25

## 2021-07-17 DIAGNOSIS — K21.9 GASTROESOPHAGEAL REFLUX DISEASE WITHOUT ESOPHAGITIS: ICD-10-CM

## 2021-07-19 RX ORDER — PANTOPRAZOLE SODIUM 40 MG/1
TABLET, DELAYED RELEASE ORAL
Qty: 180 TABLET | Refills: 1 | Status: SHIPPED | OUTPATIENT
Start: 2021-07-19 | End: 2021-07-20

## 2021-07-20 DIAGNOSIS — K21.9 GASTROESOPHAGEAL REFLUX DISEASE WITHOUT ESOPHAGITIS: ICD-10-CM

## 2021-07-20 RX ORDER — PANTOPRAZOLE SODIUM 40 MG/1
TABLET, DELAYED RELEASE ORAL
Qty: 180 TABLET | Refills: 1 | Status: SHIPPED | OUTPATIENT
Start: 2021-07-20 | End: 2022-05-31

## 2021-07-20 RX ORDER — PANTOPRAZOLE SODIUM 40 MG/1
TABLET, DELAYED RELEASE ORAL
Qty: 180 TABLET | Refills: 1 | Status: CANCELLED | OUTPATIENT
Start: 2021-07-20

## 2021-07-20 NOTE — TELEPHONE ENCOUNTER
Pt called in stating that her prescription (pantoprazole) needs a PA and since she is out she would like to know if she can get samples    Please Advise

## 2021-08-30 DIAGNOSIS — J30.9 ALLERGIC RHINITIS, UNSPECIFIED SEASONALITY, UNSPECIFIED TRIGGER: ICD-10-CM

## 2021-08-30 RX ORDER — MONTELUKAST SODIUM 10 MG/1
TABLET ORAL
Qty: 90 TABLET | Refills: 0 | Status: SHIPPED | OUTPATIENT
Start: 2021-08-30 | End: 2021-11-24

## 2021-09-16 DIAGNOSIS — G43.111 INTRACTABLE MIGRAINE WITH AURA WITH STATUS MIGRAINOSUS: ICD-10-CM

## 2021-09-16 RX ORDER — METOPROLOL SUCCINATE 25 MG/1
25 TABLET, EXTENDED RELEASE ORAL DAILY
Qty: 90 TABLET | Refills: 1 | Status: SHIPPED | OUTPATIENT
Start: 2021-09-16 | End: 2022-03-11

## 2021-09-24 NOTE — PROGRESS NOTES
[address] [facility]  [phone]        Pre-Op Phone Call:     Patient Name: Kendal Choudhary     Telephone Information:   Mobile [phone]     Home phone:  [phone]    Surgery Time:   29-75-24-36 Time:  9636     Left extended Message:  NA     Message left with:     Recent change in health status:  No     Advised of transportation/ policy:  Yes     NPO policy reviewed:  NA     Advised to take morning heart/blood pressure medications with sips of water morning of surgery? Yes     Instructed to bring eye drops, photo identification, and insurance card day of surgery? Yes     Advised to wear short sleeved button down shirt (no T-shirt underneath):  NA     Advised not to wear jewelry, hairpins, or pantyhose day of surgery? NA     Advised not to wear make-up and to wash face day of surgery?   NA    Remarks:        Electronically signed by:  Janelle Orellana RN at 9/24/2021 11:31 AM

## 2021-09-27 ENCOUNTER — HOSPITAL ENCOUNTER (OUTPATIENT)
Dept: SURGERY | Age: 56
Discharge: HOME | End: 2021-09-27
Payer: COMMERCIAL

## 2021-09-27 VITALS — SYSTOLIC BLOOD PRESSURE: 137 MMHG | DIASTOLIC BLOOD PRESSURE: 87 MMHG

## 2021-09-27 PROCEDURE — 66821 AFTER CATARACT LASER SURGERY: CPT

## 2021-09-27 PROCEDURE — 6370000000 HC RX 637 (ALT 250 FOR IP): Performed by: OPHTHALMOLOGY

## 2021-09-27 RX ORDER — SODIUM CHLORIDE 0.9 % (FLUSH) 0.9 %
5-40 SYRINGE (ML) INJECTION PRN
Status: CANCELLED | OUTPATIENT
Start: 2021-09-27

## 2021-09-27 RX ORDER — PHENYLEPHRINE HCL 2.5 %
1 DROPS OPHTHALMIC (EYE) ONCE
Status: COMPLETED | OUTPATIENT
Start: 2021-09-27 | End: 2021-09-27

## 2021-09-27 RX ORDER — TROPICAMIDE 10 MG/ML
1 SOLUTION/ DROPS OPHTHALMIC ONCE
Status: COMPLETED | OUTPATIENT
Start: 2021-09-27 | End: 2021-09-27

## 2021-09-27 RX ORDER — SODIUM CHLORIDE 0.9 % (FLUSH) 0.9 %
5-40 SYRINGE (ML) INJECTION EVERY 12 HOURS SCHEDULED
Status: CANCELLED | OUTPATIENT
Start: 2021-09-27

## 2021-09-27 RX ORDER — SODIUM CHLORIDE 9 MG/ML
25 INJECTION, SOLUTION INTRAVENOUS PRN
Status: CANCELLED | OUTPATIENT
Start: 2021-09-27

## 2021-09-27 RX ADMIN — PHENYLEPHRINE HYDROCHLORIDE 1 DROP: 25 SOLUTION/ DROPS OPHTHALMIC at 10:28

## 2021-09-27 RX ADMIN — TROPICAMIDE 1 DROP: 10 SOLUTION/ DROPS OPHTHALMIC at 10:28

## 2021-09-27 NOTE — PROGRESS NOTES
OD:   Power: 3.2  Shots: 24  Total Power: 75.4 mJ    OS:   Power: 3.2  Shots: 29  Total power: 96.7 mJ

## 2021-09-27 NOTE — OP NOTE
[address]  [address] [facility] [address] [phone]      9/27/2021    Patient name: Selma Limon  YOB: 1965  MRN: [id number]    Allergies: Allergies   Allergen Reactions    Adhesive Tape     Ceftin [Cefuroxime Axetil]      Rash   10/27/17 severe rash    Inderal [Propranolol Hcl] Other (See Comments)     Not effective    Macrobid [Nitrofurantoin Monohydrate Macrocrystals] Nausea Only    Phenergan [Promethazine Hcl] Other (See Comments)     Overstim    Prozac [Fluoxetine Hcl] Other (See Comments)     sedation    Ultram [Tramadol Hcl] Itching, Nausea Only and Other (See Comments)     Not effective    Biaxin [Clarithromycin] Nausea And Vomiting    Cipro Xr Nausea And Vomiting    Darvocet [Propoxyphene N-Acetaminophen] Nausea And Vomiting    Sulfa Antibiotics Nausea And Vomiting    Sulfacetamide Sodium Nausea And Vomiting    Sulfamethoxazole-Trimethoprim Nausea Only and Rash       Pre-operative diagnosis:  After cataract obscuring vision of  both eyes. Post-operative diagnosis:  Same    Procedure Performed:  YAG Capsulotomy of both eyes. Anesthesia:  None    Estimated Blood Loss: None    Specimens removed: None    Complications:  None    Description of Procedure: A Yag Capsulotomy was performed today on both the left and right eyes. Pt appears to be oriented to time, place, and person. Pre-op medications instilled in the left eye: Proparacaine 1 drop, David-Synephrine 2.5% 1 drop topical and Mydriacyl 0.5% 1 drop topical. Patient is comfortable and will be released to self. Electronically signed by: Yamini Santana MD,9/27/2021,11:44 [address]  [address] [facility]. [facility] [address] [phone]      9/27/2021    Patient name: Selma Limon  YOB: 1965  MRN: [id number]    Allergies:    Allergies   Allergen Reactions    Adhesive Tape     Ceftin [Cefuroxime Axetil]      Rash 10/27/17 severe rash    Inderal [Propranolol Hcl] Other (See Comments)     Not effective    Macrobid [Nitrofurantoin Monohydrate Macrocrystals] Nausea Only    Phenergan [Promethazine Hcl] Other (See Comments)     Overstim    Prozac [Fluoxetine Hcl] Other (See Comments)     sedation    Ultram [Tramadol Hcl] Itching, Nausea Only and Other (See Comments)     Not effective    Biaxin [Clarithromycin] Nausea And Vomiting    Cipro Xr Nausea And Vomiting    Darvocet [Propoxyphene N-Acetaminophen] Nausea And Vomiting    Sulfa Antibiotics Nausea And Vomiting    Sulfacetamide Sodium Nausea And Vomiting    Sulfamethoxazole-Trimethoprim Nausea Only and Rash       Pre-operative diagnosis:  After cataract obscuring vision of  both eyes. Post-operative diagnosis:  Same    Procedure Performed:  YAG Capsulotomy of both eyes. Anesthesia:  None    Estimated Blood Loss: None    Specimens removed: None    Complications:  None    Description of Procedure: A Yag Capsulotomy was performed today on both the left and right eyes. Pt appears to be oriented to time, place, and person. Pre-op medications instilled in the left eye: Proparacaine 1 drop, David-Synephrine 2.5% 1 drop topical and Mydriacyl 0.5% 1 drop topical. Patient is comfortable and will be released to self.      Electronically signed by: Seema Forbes MD,9/27/2021,11:44 AM

## 2021-10-21 NOTE — TELEPHONE ENCOUNTER
I reviewed her TSH results. Her TSH is actually lower than her previous testing. I would not want to increase her levothyroxine as it might cause her thyroid to go hyperactive. If she is having issues that she would like to discuss please help her schedule an appointment for evaluation. Bexarotene Counseling:  I discussed with the patient the risks of bexarotene including but not limited to hair loss, dry lips/skin/eyes, liver abnormalities, hyperlipidemia, pancreatitis, depression/suicidal ideation, photosensitivity, drug rash/allergic reactions, hypothyroidism, anemia, leukopenia, infection, cataracts, and teratogenicity.  Patient understands that they will need regular blood tests to check lipid profile, liver function tests, white blood cell count, thyroid function tests and pregnancy test if applicable.

## 2021-10-28 ENCOUNTER — OFFICE VISIT (OUTPATIENT)
Dept: FAMILY MEDICINE CLINIC | Age: 56
End: 2021-10-28
Payer: COMMERCIAL

## 2021-10-28 VITALS
BODY MASS INDEX: 28.7 KG/M2 | HEIGHT: 63 IN | RESPIRATION RATE: 16 BRPM | DIASTOLIC BLOOD PRESSURE: 86 MMHG | OXYGEN SATURATION: 97 % | SYSTOLIC BLOOD PRESSURE: 126 MMHG | HEART RATE: 80 BPM | WEIGHT: 162 LBS

## 2021-10-28 DIAGNOSIS — I10 ESSENTIAL HYPERTENSION: ICD-10-CM

## 2021-10-28 DIAGNOSIS — Z80.3 FAMILY HISTORY OF BREAST CANCER IN MOTHER: ICD-10-CM

## 2021-10-28 DIAGNOSIS — Z23 NEED FOR ZOSTER VACCINATION: ICD-10-CM

## 2021-10-28 DIAGNOSIS — G43.109 MIGRAINE WITH AURA AND WITHOUT STATUS MIGRAINOSUS, NOT INTRACTABLE: ICD-10-CM

## 2021-10-28 DIAGNOSIS — E03.9 ACQUIRED HYPOTHYROIDISM: Primary | ICD-10-CM

## 2021-10-28 DIAGNOSIS — F39 EPISODIC MOOD DISORDER (HCC): ICD-10-CM

## 2021-10-28 DIAGNOSIS — J45.20 MILD INTERMITTENT ASTHMA, UNSPECIFIED WHETHER COMPLICATED: ICD-10-CM

## 2021-10-28 PROCEDURE — 99214 OFFICE O/P EST MOD 30 MIN: CPT | Performed by: FAMILY MEDICINE

## 2021-10-28 PROCEDURE — 90471 IMMUNIZATION ADMIN: CPT | Performed by: FAMILY MEDICINE

## 2021-10-28 PROCEDURE — 90750 HZV VACC RECOMBINANT IM: CPT | Performed by: FAMILY MEDICINE

## 2021-10-28 RX ORDER — LEVOTHYROXINE SODIUM 0.1 MG/1
100 TABLET ORAL DAILY
Qty: 90 TABLET | Refills: 1 | Status: SHIPPED | OUTPATIENT
Start: 2021-10-28 | End: 2022-06-09 | Stop reason: SDUPTHER

## 2021-10-28 NOTE — PATIENT INSTRUCTIONS
INSTRUCTIONS  NEXT APPOINTMENT: Please schedule fasting annual physical (30 minutes) in 6 months. OK to have water, black coffee and medications (except for diabetes medicines). · PLEASE TAKE THIS FORM TO CHECK-OUT WINDOW TO SCHEDULE NEXT VISIT. · Since you are taking a CONTROLLED MEDICATION, will need to be seen at least every 6 months AND will need to have a future appointment scheduled for any refills. Be sure to schedule on way out of office today to avoid denial of future refills. · Eat less, move more! You can do it! · See what GYN suggests for hot flashes. Patient Education       MENOPAUSE    Overview   What is menopause? Menopause is when a womans menstrual periods permanently end. It happens because, as a woman ages, her ovaries make less of the female hormones estrogen and progesterone. These are the hormones that regulate your menstrual cycle. The timing of actual menopause is different for each woman, but it's normal for menopause to occur any time from age 36 to age 61. Menopause is a gradual process that can take a number of years. Perimenopause is the term for the 3 to 5 years around the time of menopause and before your final period. Symptoms   What are the common signs and symptoms of menopause? Some women just stop having periods. Most women experience some symptoms, such as the following:    A change in your menstrual cycle. This is one of the first signs of menopause. You may skip periods or they may occur closer together. Your flow may be lighter or heavier than usual.    Hot flashes. Hot flashes are the most common symptom of menopause. When you have a hot flash, you'll feel warm from your chest to your head, often in wave-like sensations. Your skin may turn red and you may sweat. You may feel sick to your stomach and dizzy. You may also have a headache and feel like your heart is beating very fast and hard. Vaginal dryness.  During and after menopause, the skin of your vagina and vulva (the area around your vagina) becomes thinner. Your vagina also loses its ability to produce as much lubrication (wetness) during sexual arousal. These changes can lead to pain during sex. You can use an over-the-counter water-based sexual lubricant (such as K-Y Jelly) or moisturizers for the vaginal area (such as Vagisil) to make sex less painful. You can also talk to your doctor about the benefits and risks of using prescription estrogen cream for vaginal changes. Urinary tract problems. You're more likely to have bladder and urinary tract infections during and after menopause. Talk to your doctor if you have to go to the bathroom often, feel an urgent need to urinate, feel a burning sensation when urinating or are not able to urinate. Headaches, night sweats, trouble sleeping and tiredness are other symptoms. As youre going through menopause, you may have trouble falling asleep or staying asleep. Night sweats may wake you up. You may not get enough rapid eye movement (REM) sleep (the stage of sleep during which you dream). A lack of REM sleep may make you feel tired, graham and stressed out. Weight gain. Many women gain weight during menopause. A healthy diet and exercising most, if not all, days of the week will help keep you fit. Talk to your doctor if you have:  A change in your monthly cycle   Heavy bleeding   Bleeding that lasts longer than usual   Bleeding more often than every 3 weeks   Bleeding after sexual intercourse   Any blood spotting between periods     Does menopause have emotional symptoms? Many women experience emotional symptoms during menopause. These symptoms may include sadness, anxiety and mood swings. For some women, symptoms can be severe. If you find that you're having emotional problems, talk to your family doctor. When does menopause occur? The timing of actual menopause is different for each woman.  The average age for women to have their last period is about 51. But it's normal for menopause to occur any time from age 36 to 61. A woman often goes through menopause at about the same age as her mother. If you stop having periods early (before age 36) your doctor can do a blood test to see if you're actually going through menopause or if there is another cause for your missed periods. Menopause is a gradual process that can take several years. You're not really through menopause until you haven't had a period for 12 months. (During this time, keep using birth control if you don't want to become pregnant.)  Women who have both ovaries removed during surgery will go through \"surgical menopause\" at the time of their surgery. If the uterus is taken out but the ovaries are not, a woman will stop having periods, but she will not go through surgical menopause. Treatment   What is hormone replacement therapy? Hormone replacement therapy (HRT) is a treatment for menopause symptoms that involves taking synthetic hormones (which are made in a laboratory rather than by the body). HRT can be estrogen taken alone or estrogen combined with another hormone, progestin. Some women have found that HRT can relieve symptoms such as hot flashes, vaginal dryness and some urinary problems. However, HRT is not for everyone. New information from recent studies suggests that for most women, the risks of using HRT may outweigh the benefits. Talk to your doctor about the risks and benefits of HRT. Are other treatments available? Yes. Medicines such as estrogen cream, low-dose antidepressants, soy products and certain herbal supplements may help ease some menopausal symptoms. Discuss these options with your doctor. What are phytoestrogens? Phytoestrogens are plant-based substances found in some cereals, vegetables, beans and other legumes, and herbs. They may work in the body like a weak form of estrogen.  Researchers are studying whether phytoestrogens can be used to relieve some symptoms of menopause. They are also studying the side effects caused by these substances. Many soy products, such as tofu, tempeh, soymilk and soy nuts, are good sources of phytoestrogens. Some studies indicate that soy supplements may reduce hot flashes in women after menopause. However, the results havent been consistent. There is not enough scientific evidence to recommend the use of herbs that contain phytoestrogens to treat symptoms of menopause. This is also true of pills and creams made with these herbs. In addition, not enough is known about the risks of using these products. Herbs and supplements are not regulated in the way that medicines are. Some herbs and supplements can be harmful when combined with certain medicines. If youre considering using any natural or herbal products to ease your symptoms, talk to your doctor first.    Should I avoid certain foods or drinks during menopause? If you have hot flashes, you may want to avoid spicy or hot foods and drinks. These can trigger hot flashes. Alcohol can trigger hot flashes, too. It also interferes with bone growth and calcium absorption. Women who are going through menopause should avoid or limit alcohol. Can my diet affect how well I sleep? The following tips can help reduce sleep problems:  Eat regular meals at regular times. Avoid late-night meals and heavy late-night snacks. Limit caffeine, which is found in coffee, tea, chocolate and cola drinks. Caffeine stays in the bloodstream for up to 6 hours and can interfere with sleep. Avoid alcohol. It may make you feel sleepy, but it actually affects the cycle of REM and non-REM sleep. This may cause you to wake up throughout the night. Help for hot flashes  Turn your thermostat down. Sleep in a cool room. Dress in layers, so you can remove clothing when you get too warm. Wear cotton and other natural fabrics that \"breathe\" so you don't get overheated.  Use cotton sheets on your bed.   Drink cool water or other beverages when a hot flash starts. Get plenty of exercise. Find out what triggers your hot flashes and avoid them. Spicy foods, alcohol, tight clothing and hot humid weather are some common triggers. Complications   How does menopause affect bone health? The older a woman is, the greater her risk of osteoporosis. A womans risk becomes even greater when she goes through menopause. When your estrogen level decreases during menopause, you lose more bone than your body can replace. This makes your bones weaker and more likely to break. To keep your bones strong, its important to get enough calcium and vitamin D in your diet, which helps your body absorb calcium. Your doctor can suggest ways to get more calcium through food, drink and, possibly, a calcium supplement. He or she may also suggest that you take a vitamin D supplement to help your body process calcium. Ask your doctor what amount of daily calcium and vitamin D is right for you. In general, women 27to 48years of age need 1,000 mg of calcium each day. Women older than 48years of age need 1,200 mg of calcium each day. Milk, yogurt and other dairy foods are good sources of calcium. Soybeans, broccoli and tofu are, too. Women 27to 79years of age usually need at least 600 international units (IU) of vitamin D each day. Women older than 79years of age need at least 800 IU of vitamin D each day. Fatty fish, such as salmon and tuna, is a good source of vitamin D. How does menopause affect heart health? Women are more likely to develop heart disease after menopause. Lower estrogen levels may be part of the cause, in addition to other health issues that are more common as women get older. These include gaining weight, becoming less active, and developing high blood pressure or diabetes.  You can reduce your risk of these health problems by eating a variety of healthy, nutrient-rich foods, staying active and maintaining an appropriate weight. How does menopause affect iron levels in my blood? If you are still having periods as you go through menopause, you may continue to be at risk of a low iron level, especially if your bleeding is heavy or you spot between periods. This can lead to anemia. Eating at least 3 servings of iron-rich foods a day will help you get enough iron in your diet. Good sources of iron include spinach, beans and meat. Your doctor may also suggest that you take an iron supplement. NATURAL PRODUCTS FOR MENOPAUSE SYMPTOMS    Because natural products used for menopausal symptoms can have side effects and can interact with other botanicals or supplements or with medications, research in this area is addressing safety as well as efficacy. Some findings from this research are highlighted below. Botanicals  Black cohosh (Actaea racemosa, Cimicifuga racemosa). This herbA plant or part of a plant used for its flavor, scent, or potential therapeutic properties. Includes flowers, leaves, bark, fruit, seeds, stems, and roots. has received more scientific attention for its possible effects on menopausal symptoms than have other botanicals. Studies of its effectiveness in reducing hot flashes and other symptoms have had mixed results. A study funded by [address] [place] [address] [facility] ([facility]) found that black cohosh, whether used alone or with other botanicals, did not relieve hot flashes and night sweats in postmenopausal women or those approaching menopause. In the same study, black cohosh also had no effect on gynecological problems such as vaginal dryness and abnormal bleeding. Other research suggests that black cohosh does not act like estrogen, as once was thought. Recent reviews of the clinical research literature on black cohosh for menopausal symptoms conclude that its efficacy has yet to be demonstrated.   [facility] experts suggest that women should discontinue use of black cohosh and consult a health care practitioner if they have a liver disorder or develop symptoms of liver trouble, such as abdominal pain, dark urine, or jaundice. There have been several case reports of hepatitis (inflammation of the liver), as well as liver failure, in women who were taking black cohosh. It is not known if black cohosh was responsible for these problems. Although these cases are very rare and the evidence is not definitive, scientists are concerned about the possible effects of black cohosh on the liver. Study Says Black Cohosh and [employer] Are No Better Than Placebo  A year-long study of 80 women experiencing at least 35 episodes of hot flashes and night sweats each week compared two botanicals (black cohosh and red clover) with menopausal hormone therapy and placebo to evaluate symptom relief and safety. The researchers concluded that although neither botanical helped to relieve symptoms when compared to placebo, no safety concerns emerged during the trial.  Symptoms declined 63 percent in the placebo groupmuch more than the researchers expected. For red clover, the decline in symptoms (57 percent) was similar to placebo; for black cohosh, the decline (34 percent) was less than placebo. Symptoms declined 94 percent with menopausal hormone therapy. The study was conducted by the [facility] for [employer], funded in part by RAGHAVENDRA Sofia (Winsome sinensis). Only one randomized clinical study of Dionisio Brine has been done. The researchers did not find it to be useful in reducing hot flashes. Stevie Rubalcava is known to interact with, and increase the activity in the body of, the blood-thinning medicine warfarin. This can lead to bleeding complications in women who take dong quai. Ginseng (Panax ginseng or Panax quinquefolius).  The 2005 NIH panel concluded that ginseng may help with some menopausal symptoms, such as mood symptoms and sleep disturbances, and with ones overall sense of well-being. However, ginseng has not been found helpful for hot flashes. Kava (Piper methysticum). According to the 2005 NIH panel, there is no evidence that kava decreases hot flashes, although it may decrease anxiety. Furthermore, it is important to note that kava has been associated with liver disease. The U.[place]. Food and Drug Administration (FDA) has issued a warning to patients and providers about kava because of its potential to damage the liver. Red clover (Trifolium pratense). The 2005 NIH panel found no consistent or conclusive evidence that red clover leaf extract reduces hot flashes. A large clinical trial and several reviews of the research literature concluded that red clover had no significant beneficial effects on menopausal symptoms. A review of the research literature also found no apparent evidence of adverse events from short-term use (up to 16 weeks). However, the same review noted the lack of data on the safety of long-term use. There are some concerns that red clover, which contains phytoestrogens, might have harmful effects on hormone-sensitive tissue (for example, in the breast and uterus). (See box below for more information on phytoestrogens.)   Soy. The scientific literature includes mixed results on soy extracts for hot flashes. Some studies find benefits, but others do not. Although information on adverse effects is limited, soy extracts appear to be generally safe when taken for short periods of time. However, long-term use of soy extracts (which also contain phytoestrogens) has been associated with thickening of the lining of the uterus. About Phytoestrogens  Phytoestrogens occur naturally in plants and act like estrogens in some tissues, including bone and cardiovascular tissue. Some botanical products, such as soy and red clover, contain phytoestrogens.  Plants rich in phytoestrogens have been studied for treating menopause symptoms. However, much remains to be learned about these plant products, including their effects in the human body. Doctors caution that certain women need to be particularly careful about using phytoestrogens, especially:  Women who have had or are at increased risk for diseases or conditions that are affected by hormones, such as breast, uterine, or ovarian cancer   Women who are taking medications that increase estrogen levels in the body, such as birth control pills; menopausal hormone therapy; or a type of cancer drug called selective estrogen receptor modulators (SERMs), such as tamoxifen. If You Are Considering a Complementary Health Practice for Menopausal Symptoms  Although there is little scientific evidence to support the effectiveness of complementary health practices for menopausal symptoms, it is possible that some approaches may provide relief to women during the menopausal transition. Here are a few important points to remember if you are considering these practices:  Keep in mind that although many dietary supplements (and some prescription drugs) come from natural sources, natural does not always mean safe.  For example, the herb kava may cause serious harm to the liver. Also, a s use of the term standardized (or verified or certified) does not necessarily guarantee product quality or consistency. Be aware that an herbal supplementA type of dietary supplement that contains herbs, either alone or in mixtures. may contain several compounds and that its active ingredients may not be known. Researchers are studying many of these products to identify those ingredients and understand their effects in the body. Also consider the possibility that whats on the label may not be whats in the bottle. Analyses of dietary supplements sometimes find differences between labeled and actual ingredients.  Tell all your health care providers about any complementary health practices you use. Give them a full picture of what you do to manage your health. This will help ensure coordinated and safe care. For tips about talking with your health care providers about complementary and alternative medicineA group of diverse medical and health care systems, practices, and products that are not presently considered to be part of conventional medicine. Complementary medicine is used together with conventional medicine, and alternative medicine is used in place of conventional medicine.

## 2021-10-28 NOTE — PROGRESS NOTES
CARDIOVASCULAR VISIT NOTE   Subjective:   HPI CHRONIC:   Chief Complaint   Patient presents with    Hypertension      Patient here for follow-up of multiple chronic conditions includin. Acquired hypothyroidism    2. Episodic mood disorder ([address]    3. Mild intermittent asthma, unspecified whether complicated    4. Migraine with aura and without status migrainosus, not intractable    5. Essential hypertension    6. Family history of breast cancer in mother    9. Need for zoster vaccination      Complaints: pt is very stressed. Her mother saw dr Ion Victor yesterday and she wants mother to get an aide or go into a detention living and he wont pay for it. She is under a lot of stress. Been stress eating and gaining wt. Lots of hot flashes interrupting sleep and dysparunia. Effexor not help and felt terrible. · Following appropriate diet? Yes  · Exercise: walking intermittently  · Taking medicines daily as directed? Yes  · Any side effects of medications? No    Review of Systems   Respiratory ROS: cough? No   Wheezing? No  Cardiovascular ROS: chest pain? No   Shortness of breath? No    Health Maintenance Due   Topic Date Due    Cervical cancer screen  Never done    Shingles Vaccine (1 of 2) Never done    COVID-19 Vaccine (3 - Pfizer booster) 10/09/2021     *Chief complaint, HPI, History and ROS provided by the medical assistant has been reviewed and verified by provider- Kylee Tripp MD    CHART REVIEW   reports that she has never smoked.  She has never used smokeless tobacco.  Health Maintenance Due   Topic Date Due    Cervical cancer screen  Never done    Shingles Vaccine (1 of 2) Never done    COVID-19 Vaccine (3 - Pfizer booster) 10/09/2021     Current Outpatient Medications   Medication Instructions    albuterol sulfate  (90 Base) MCG/ACT inhaler 2 puffs, Inhalation, EVERY 4 HOURS PRN    Blood Pressure Monitor KIT Daily as needed    butalbital-aspirin-caffeine [facility]) -90 MG capsule TAKE 1 CAPSULE BY MOUTH EVERY 6 HOURS AS NEEDED FOR HEADACHES.  MAX 40 PER MONTH    calcium carbonate (OSCAL) 500 mg, Oral, DAILY    dicyclomine (BENTYL) 20 MG tablet TAKE 1 TABLET BY MOUTH THREE TIMES DAILY AS NEEDED    fexofenadine (ALLEGRA) 180 mg, Oral, DAILY    fluticasone (FLONASE) 50 MCG/ACT nasal spray 1 spray, Nasal, DAILY    levothyroxine (SYNTHROID) 100 mcg, Oral, DAILY    metoprolol succinate (TOPROL XL) 25 mg, Oral, DAILY    montelukast (SINGULAIR) 10 MG tablet TAKE 1 TABLET BY MOUTH EVERY NIGHT AT BEDTIME    pantoprazole (PROTONIX) 40 MG tablet TAKE 1 TABLET BY MOUTH TWICE DAILY    Premarin 0.5 g, Vaginal, DAILY    SUMAtriptan (IMITREX) 20 MG/ACT nasal spray USE 1 SPRAY NASALLY DAILY AS NEEDED FOR MIGRAINE    tiZANidine (ZANAFLEX) 4 mg, Oral, EVERY 8 HOURS PRN    valACYclovir (VALTREX) 500 mg, Oral    VITAMIN D, CHOLECALCIFEROL, PO 5,000 Units, Oral, DAILY     LAST LABS  LDL Calculated   Date Value Ref Range Status   03/25/2021 138 (H) <100 mg/dL Final     Lab Results   Component Value Date    HDL 51 03/25/2021     Lab Results   Component Value Date    TRIG 77 03/25/2021     Lab Results   Component Value Date     03/25/2021    K 4.1 03/25/2021    CREATININE 0.8 03/25/2021     Lab Results   Component Value Date    WBC 11.7 (H) 02/09/2018    HGB 14.5 02/09/2018     02/09/2018     Lab Results   Component Value Date    ALT 6 (L) 03/25/2021    AST 15 03/25/2021    ALKPHOS 78 03/25/2021    BILITOT 0.3 03/25/2021     TSH (uIU/mL)   Date Value   03/25/2021 1.77     Lab Results   Component Value Date    GLUCOSE 94 03/25/2021     Lab Results   Component Value Date    LABA1C 5.5 05/26/2017     Objective:   PHYSICAL EXAM   /86 (Site: Right Upper Arm, Position: Sitting, Cuff Size: Large Adult)   Pulse 80   Resp 16   Ht 5' 3\" (1.6 m)   Wt 162 lb (73.5 kg)   SpO2 97%   BMI 28.70 kg/m²   BP Readings from Last 5 Encounters:   10/28/21 126/86   09/27/21 137/87   05/07/21 Adi Michelle ) 149/99   04/21/21 130/86   02/08/21 (!) 138/90     Wt Readings from Last 5 Encounters:   10/28/21 162 lb (73.5 kg)   05/07/21 150 lb (68 kg)   04/21/21 159 lb (72.1 kg)   02/08/21 161 lb (73 kg)   01/11/21 150 lb (68 kg)      GENERAL:   · well-developed, well-nourished, alert, no distress. LUNGS:    · Breathing unlabored  · clear to auscultation bilaterally and good air movement  CARDIOVASC:   · regular rate and rhythm  · LEGS:  Lower extremity edema: none    SKIN: warm and dry     Assessment and Plan:      Diagnosis Orders   1. Acquired hypothyroidism  levothyroxine (SYNTHROID) 100 MCG tablet   2. Episodic mood disorder ([facility] [address]     3. Mild intermittent asthma, unspecified whether complicated     4. Migraine with aura and without status migrainosus, not intractable     5. Essential hypertension     6. Family history of breast cancer in mother     9. Need for zoster vaccination  Zoster Subunit (SHINGRIX)    Zoster Subunit (SHINGRIX)   Stable     Continue current Tx plan. Any changes marked below. INSTRUCTIONS  NEXT APPOINTMENT: Please schedule fasting annual physical (30 minutes) in 6 months. OK to have water, black coffee and medications (except for diabetes medicines). · PLEASE TAKE THIS FORM TO CHECK-OUT WINDOW TO SCHEDULE NEXT VISIT. · Since you are taking a CONTROLLED MEDICATION, will need to be seen at least every 6 months AND will need to have a future appointment scheduled for any refills. Be sure to schedule on way out of office today to avoid denial of future refills. · Eat less, move more! You can do it! · See what GYN suggests for hot flashes.

## 2022-02-16 LAB — MAMMOGRAPHY, EXTERNAL: NORMAL

## 2022-02-23 DIAGNOSIS — K58.9 IRRITABLE BOWEL SYNDROME, UNSPECIFIED TYPE: ICD-10-CM

## 2022-02-23 RX ORDER — DICYCLOMINE HCL 20 MG
TABLET ORAL
Qty: 90 TABLET | Refills: 3 | Status: SHIPPED | OUTPATIENT
Start: 2022-02-23 | End: 2022-09-01

## 2022-02-28 ENCOUNTER — TELEPHONE (OUTPATIENT)
Dept: FAMILY MEDICINE CLINIC | Age: 57
End: 2022-02-28

## 2022-02-28 NOTE — TELEPHONE ENCOUNTER
Pt wants to know when her second shingles shot is due? She thought April? If so ok to wait until appointment in may?

## 2022-03-11 DIAGNOSIS — G43.111 INTRACTABLE MIGRAINE WITH AURA WITH STATUS MIGRAINOSUS: ICD-10-CM

## 2022-03-11 RX ORDER — METOPROLOL SUCCINATE 25 MG/1
25 TABLET, EXTENDED RELEASE ORAL DAILY
Qty: 90 TABLET | Refills: 1 | Status: SHIPPED | OUTPATIENT
Start: 2022-03-11 | End: 2022-09-02 | Stop reason: SDUPTHER

## 2022-04-28 ENCOUNTER — TELEPHONE (OUTPATIENT)
Dept: FAMILY MEDICINE CLINIC | Age: 57
End: 2022-04-28

## 2022-04-28 ENCOUNTER — OFFICE VISIT (OUTPATIENT)
Dept: FAMILY MEDICINE CLINIC | Age: 57
End: 2022-04-28
Payer: COMMERCIAL

## 2022-04-28 VITALS
BODY MASS INDEX: 28.7 KG/M2 | OXYGEN SATURATION: 97 % | SYSTOLIC BLOOD PRESSURE: 134 MMHG | TEMPERATURE: 98.3 F | HEIGHT: 63 IN | WEIGHT: 162 LBS | RESPIRATION RATE: 16 BRPM | DIASTOLIC BLOOD PRESSURE: 68 MMHG | HEART RATE: 83 BPM

## 2022-04-28 DIAGNOSIS — J01.90 ACUTE SINUSITIS, RECURRENCE NOT SPECIFIED, UNSPECIFIED LOCATION: Primary | ICD-10-CM

## 2022-04-28 PROCEDURE — 99213 OFFICE O/P EST LOW 20 MIN: CPT | Performed by: FAMILY MEDICINE

## 2022-04-28 RX ORDER — CEPHALEXIN 500 MG/1
500 CAPSULE ORAL 2 TIMES DAILY
Qty: 14 CAPSULE | Refills: 0 | Status: SHIPPED | OUTPATIENT
Start: 2022-04-28 | End: 2022-05-05

## 2022-04-28 RX ORDER — FLUCONAZOLE 150 MG/1
150 TABLET ORAL ONCE
Qty: 2 TABLET | Refills: 0 | Status: SHIPPED | OUTPATIENT
Start: 2022-04-28 | End: 2022-04-28

## 2022-04-28 SDOH — ECONOMIC STABILITY: FOOD INSECURITY: WITHIN THE PAST 12 MONTHS, THE FOOD YOU BOUGHT JUST DIDN'T LAST AND YOU DIDN'T HAVE MONEY TO GET MORE.: NEVER TRUE

## 2022-04-28 SDOH — ECONOMIC STABILITY: FOOD INSECURITY: WITHIN THE PAST 12 MONTHS, YOU WORRIED THAT YOUR FOOD WOULD RUN OUT BEFORE YOU GOT MONEY TO BUY MORE.: NEVER TRUE

## 2022-04-28 ASSESSMENT — PATIENT HEALTH QUESTIONNAIRE - PHQ9
2. FEELING DOWN, DEPRESSED OR HOPELESS: 0
1. LITTLE INTEREST OR PLEASURE IN DOING THINGS: 0
SUM OF ALL RESPONSES TO PHQ QUESTIONS 1-9: 0
SUM OF ALL RESPONSES TO PHQ9 QUESTIONS 1 & 2: 0
SUM OF ALL RESPONSES TO PHQ QUESTIONS 1-9: 0

## 2022-04-28 ASSESSMENT — SOCIAL DETERMINANTS OF HEALTH (SDOH): HOW HARD IS IT FOR YOU TO PAY FOR THE VERY BASICS LIKE FOOD, HOUSING, MEDICAL CARE, AND HEATING?: NOT HARD AT ALL

## 2022-04-28 NOTE — PROGRESS NOTES
4/28/2022    This is a 60 y.o. female   Chief Complaint   Patient presents with    Congestion     patient believes she might have a sinus infection, patient stated it has been going on since sunday.  Cough     HPI    Here for sinus congestion    Notes bad seasonal allergies. Typically has a rough go when the pear trees are blooming in her neighborhood. - symptoms started about 3 weeks ago with allergy symptoms. Then over the past week it has worsened with sinus pain, post-nasal drip, hoarse voice, cough that is mildly productive  - notes sensation of chest tightness when her allergies are worse  - no fevers  - trying Mucinex and OTC meds with minimal relief  - on Allegra and Singulair for allergies.  Has tried Flonase recently as well      Review of Systems     Current Outpatient Medications   Medication Sig Dispense Refill    cephALEXin (KEFLEX) 500 MG capsule Take 1 capsule by mouth 2 times daily for 7 days 14 capsule 0    metoprolol succinate (TOPROL XL) 25 MG extended release tablet TAKE 1 TABLET BY MOUTH DAILY 90 tablet 1    dicyclomine (BENTYL) 20 MG tablet TAKE 1 TABLET BY MOUTH THREE TIMES DAILY AS NEEDED 90 tablet 3    montelukast (SINGULAIR) 10 MG tablet TAKE 1 TABLET BY MOUTH EVERY NIGHT AT BEDTIME 90 tablet 1    levothyroxine (SYNTHROID) 100 MCG tablet Take 1 tablet by mouth daily 90 tablet 1    pantoprazole (PROTONIX) 40 MG tablet TAKE 1 TABLET BY MOUTH TWICE DAILY 180 tablet 1    albuterol sulfate  (90 Base) MCG/ACT inhaler Inhale 2 puffs into the lungs every 4 hours as needed for Wheezing or Shortness of Breath 1 Inhaler 3    conjugated estrogens (PREMARIN) 0.625 MG/GM vaginal cream Place 0.5 g vaginally daily 1 Tube 3    tiZANidine (ZANAFLEX) 4 MG tablet Take 1 tablet by mouth every 8 hours as needed (back pain) 20 tablet 0    valACYclovir (VALTREX) 500 MG tablet Take 500 mg by mouth      SUMAtriptan (IMITREX) 20 MG/ACT nasal spray USE 1 SPRAY NASALLY DAILY AS NEEDED FOR MIGRAINE 1 each 5    Blood Pressure Monitor KIT Daily as needed 1 kit 0    calcium carbonate (OSCAL) 500 MG TABS tablet Take 500 mg by mouth daily      fluticasone (FLONASE) 50 MCG/ACT nasal spray 1 spray by Nasal route daily. 1 Bottle 6    fexofenadine (ALLEGRA) 180 MG tablet Take 1 tablet by mouth daily. 90 tablet 1    VITAMIN D, CHOLECALCIFEROL, PO Take 5,000 Units by mouth daily       butalbital-aspirin-caffeine (FIORINAL) -40 MG capsule TAKE 1 CAPSULE BY MOUTH EVERY 6 HOURS AS NEEDED FOR HEADACHE. MAX 40 PER MONTH 40 capsule 2     No current facility-administered medications for this visit. /68 (Site: Right Upper Arm, Position: Sitting, Cuff Size: Large Adult)   Pulse 83   Temp 98.3 °F (36.8 °C) (Oral)   Resp 16   Ht 5' 3\" (1.6 m)   Wt 162 lb (73.5 kg)   SpO2 97%   BMI 28.70 kg/m²     Physical Exam  Constitutional:       Appearance: Normal appearance. HENT:      Head: Normocephalic and atraumatic. Comments: Sinus pressure/tenderness     Right Ear: Tympanic membrane normal.      Left Ear: Tympanic membrane normal.      Mouth/Throat:      Mouth: Mucous membranes are moist.      Comments: Postnasal drip  Cardiovascular:      Rate and Rhythm: Normal rate and regular rhythm. Heart sounds: Normal heart sounds. Pulmonary:      Effort: Pulmonary effort is normal.      Breath sounds: Normal breath sounds. Musculoskeletal:      Cervical back: Normal range of motion. No rigidity. Lymphadenopathy:      Cervical: No cervical adenopathy. Neurological:      Mental Status: She is alert. Wt Readings from Last 3 Encounters:   04/28/22 162 lb (73.5 kg)   10/28/21 162 lb (73.5 kg)   05/07/21 150 lb (68 kg)     BP Readings from Last 3 Encounters:   04/28/22 134/68   10/28/21 126/86   09/27/21 137/87     Assessment/Plan:  1. Acute sinusitis, recurrence not specified, unspecified location  - cephALEXin (KEFLEX) 500 MG capsule;  Take 1 capsule by mouth 2 times daily for 7 days Dispense: 14 capsule; Refill: 0    Limited antibiotic options based on allergies  Symptoms progressively worsening after 3 weeks, now over past week with double sickening.   Notes has done well on Keflex in the past. Has taken this since having Ceftin reaction and did ok    Call for worsening symptoms

## 2022-04-28 NOTE — TELEPHONE ENCOUNTER
Patient calling stating her allergies have been flairing up for about three weeks. Patient has runny nose, headache, eyes itching. The past week symptoms have gotten worse. Patient has a baby shower on Saturday, and wants to see if this is contagious or not. Patient starting to cough, and feels it in her chest starting to affect her breathing for about a week. Patient does not want a vv would like to come into office. Can we schedule red visit for tomorrow with baljit at end of the day?      Please advise

## 2022-05-04 ENCOUNTER — OFFICE VISIT (OUTPATIENT)
Dept: FAMILY MEDICINE CLINIC | Age: 57
End: 2022-05-04
Payer: COMMERCIAL

## 2022-05-04 VITALS
BODY MASS INDEX: 30.48 KG/M2 | WEIGHT: 172 LBS | HEIGHT: 63 IN | HEART RATE: 75 BPM | DIASTOLIC BLOOD PRESSURE: 88 MMHG | RESPIRATION RATE: 18 BRPM | OXYGEN SATURATION: 98 % | SYSTOLIC BLOOD PRESSURE: 132 MMHG

## 2022-05-04 DIAGNOSIS — Z23 NEED FOR SHINGLES VACCINE: ICD-10-CM

## 2022-05-04 DIAGNOSIS — I10 ESSENTIAL HYPERTENSION: ICD-10-CM

## 2022-05-04 DIAGNOSIS — E03.9 ACQUIRED HYPOTHYROIDISM: ICD-10-CM

## 2022-05-04 DIAGNOSIS — E55.9 VITAMIN D DEFICIENCY: ICD-10-CM

## 2022-05-04 DIAGNOSIS — G43.111 INTRACTABLE MIGRAINE WITH AURA WITH STATUS MIGRAINOSUS: ICD-10-CM

## 2022-05-04 DIAGNOSIS — J45.20 MILD INTERMITTENT ASTHMA, UNSPECIFIED WHETHER COMPLICATED: ICD-10-CM

## 2022-05-04 DIAGNOSIS — R63.5 WEIGHT GAIN: ICD-10-CM

## 2022-05-04 DIAGNOSIS — Z00.00 ENCOUNTER FOR WELL ADULT EXAM WITHOUT ABNORMAL FINDINGS: Primary | ICD-10-CM

## 2022-05-04 PROCEDURE — 90471 IMMUNIZATION ADMIN: CPT | Performed by: FAMILY MEDICINE

## 2022-05-04 PROCEDURE — 90750 HZV VACC RECOMBINANT IM: CPT | Performed by: FAMILY MEDICINE

## 2022-05-04 PROCEDURE — 99396 PREV VISIT EST AGE 40-64: CPT | Performed by: FAMILY MEDICINE

## 2022-05-04 RX ORDER — SUMATRIPTAN 20 MG/1
SPRAY NASAL
Qty: 1 EACH | Refills: 5 | Status: SHIPPED | OUTPATIENT
Start: 2022-05-04

## 2022-05-04 NOTE — PATIENT INSTRUCTIONS
MA: Please recheck blood pressure and document in EPIC. Let me know if > 140/90 BEFORE they leave. Thanks! INSTRUCTIONS  NEXT APPOINTMENT: Please schedule check-up in 6 months with Stefanie Tucker (Nurse Practitioner). · Dr. Lyndsay Fleming and Stefanie Tucker (Nurse Practitioner) are sharing their practices as a team.  This will allow increased access to care. Office visits may alternate between these providers while we continue to maintain high quality of care. · PLEASE TAKE THIS FORM TO CHECK-OUT WINDOW TO SCHEDULE NEXT VISIT. PLEASE GET FASTING BLOODWORK DRAWN SOON. Lab is on first floor in [address]. Hours Monday to Friday 6:30 AM to 4 PM.   · Please get flu vaccine when available in fall. Can get either at this office or at stores such as [employer] and [employer]. · Can schedule to get Pneumonia 20 vaccine in June with MA.  · Eat less, move more! You can do it! Patient Education       TIPS ON WEIGHT LOSS    Weight loss maintenance is considered successful if you lose at least 10 percent of your body weight and keep that weight off for at least one year. Ideas for better weight control. Try implementing one a week. · Drink only sugar free beverages. · Drink at least 8 cups per day. · Do not eat after 7 PM.  · Snack every 2 hours during the day on 100 calorie snacks (apple, strawberry, almonds, pistachio, walnuts, cheese cubes, raw veggies like bell peppers, tomato, celery, zucchini, snow peas, broccoli). · At meals, limit portion sizes to what you could hold in your hand of a meat. · Eat all of the raw vegetables and salads that you want with vinegar or low paul dressing. · Sleep 8 hours at night. · Minimize white starches- bread, pasta, rice potatoes. Try high fiber cereal, breads, granola. · Move more- try walking 15 minutes per day. · Use small plates and bowls to make serving look bigger. · Keeping track of calories and fat grams.   Try cell phone kyle called \"Lose-it\"  · Planning your meals ahead of time  · Eating breakfast every day  · Keeping your diet steady. Eating the same on weekends,vacations and special occasions. · Watching less than 10 hours of television per week    Should I take weight loss medicines? Taking medicines may work better than diet and exercise alone for some people. OTC orlistat (brand: Linda Badillo) can help weight loss. A multivitamin must be taken every day to prevent vitamin deficiencies. Many people regain weight after they stop taking these medicines. Should I have weight loss surgery? Surgery can help with long-term weight loss maintenance, BUT people who make major lifestyle changes can get the same results. Well Visit, Women 48 to 72: Care Instructions  Overview     Well visits can help you stay healthy. Your doctor has checked your overall health and may have suggested ways to take good care of yourself. Your doctor also may have recommended tests. At home, you can help prevent illness withhealthy eating, regular exercise, and other steps. Follow-up care is a key part of your treatment and safety. Be sure to make and go to all appointments, and call your doctor if you are having problems. It's also a good idea to know your test results and keep alist of the medicines you take. How can you care for yourself at home?  Get screening tests that you and your doctor decide on. Screening helps find diseases before any symptoms appear.  Eat healthy foods. Choose fruits, vegetables, whole grains, protein, and low-fat dairy foods. Limit fat, especially saturated fat. Reduce salt in your diet.  Limit alcohol. Have no more than 1 drink a day or 7 drinks a week.  Get at least 30 minutes of exercise on most days of the week. Walking is a good choice. You also may want to do other activities, such as running, swimming, cycling, or playing tennis or team sports.  Reach and stay at a healthy weight.  This will lower your risk for many problems, such as obesity, diabetes, heart disease, and high blood pressure.  Do not smoke. Smoking can make health problems worse. If you need help quitting, talk to your doctor about stop-smoking programs and medicines. These can increase your chances of quitting for good.  Care for your mental health. It is easy to get weighed down by worry and stress. Learn strategies to manage stress, like deep breathing and mindfulness, and stay connected with your family and community. If you find you often feel sad or hopeless, talk with your doctor. Treatment can help.  Talk to your doctor about whether you have any risk factors for sexually transmitted infections (STIs). You can help prevent STIs if you wait to have sex with a new partner (or partners) until you've each been tested for STIs. It also helps if you use condoms (male or female condoms) and if you limit your sex partners to one person who only has sex with you. Vaccines are available for some STIs.  If you think you may have a problem with alcohol or drug use, talk to your doctor. This includes prescription medicines (such as amphetamines and opioids) and illegal drugs (such as cocaine and methamphetamine). Your doctor can help you figure out what type of treatment is best for you.  Protect your skin from too much sun. When you're outdoors from 10 a.m. to 4 p.m., stay in the shade or cover up with clothing and a hat with a wide brim. Wear sunglasses that block UV rays. Even when it's cloudy, put broad-spectrum sunscreen (SPF 30 or higher) on any exposed skin.  See a dentist one or two times a year for checkups and to have your teeth cleaned.  Wear a seat belt in the car. When should you call for help? Watch closely for changes in your health, and be sure to contact your doctor if you have any problems or symptoms that concern you. Where can you learn more? Go to https://[facility]. org and sign in to your [employer] account.  Enter L350 in the Search Health Information box to learn more about \"Well Visit, Women 50 to 72: Care Instructions. \"     If you do not have an account, please click on the \"Sign Up Now\" link. Current as of: October 6, 2021               Content Version: 13.2  © 8448-2808 Healthwise, Incorporated. Care instructions adapted under license by [facility] ([facility]). If you have questions about a medical condition or this instruction, always ask your healthcare professional. [address] any warranty or liability for your use of this information.

## 2022-05-04 NOTE — PROGRESS NOTES
PHYSICAL-VISIT NOTE   Subjective:     Chief Complaint   Patient presents with    Annual Exam       Kendal Choudhary is a 60 y.o. female who presents for annual testing/preventive review and check-up of medical problems listed below:  1. Encounter for well adult exam without abnormal findings    2. Intractable migraine with aura with status migrainosus    3. Mild intermittent asthma, unspecified whether complicated    4. Acquired hypothyroidism    5. Essential hypertension    6. Vitamin D deficiency    7. Need for shingles vaccine    8. Weight gain        Complaints: weight gain    ROS  See scanned \"Annual Adult Health Checklist\". Pertinent positives addressed above. CHART REVIEW  Social History     Tobacco Use    Smoking status: Never Smoker    Smokeless tobacco: Never Used   Substance Use Topics    Alcohol use: Yes     Alcohol/week: 0.0 standard drinks     Comment: rare    Drug use: No      Health Maintenance Due   Topic Date Due    Pneumococcal 0-64 years Vaccine (2 - PCV) 09/19/2016    Breast cancer screen  01/27/2022    TSH  03/25/2022    Potassium  03/25/2022    Creatinine  03/25/2022     Current Outpatient Medications   Medication Instructions    albuterol sulfate  (90 Base) MCG/ACT inhaler 2 puffs, Inhalation, EVERY 4 HOURS PRN    Blood Pressure Monitor KIT Daily as needed    butalbital-aspirin-caffeine (FIORINAL) -40 MG capsule TAKE 1 CAPSULE BY MOUTH EVERY 6 HOURS AS NEEDED FOR HEADACHE.  MAX 40 PER MONTH    calcium carbonate (OSCAL) 500 mg, Oral, DAILY    cephALEXin (KEFLEX) 500 mg, Oral, 2 TIMES DAILY    dicyclomine (BENTYL) 20 MG tablet TAKE 1 TABLET BY MOUTH THREE TIMES DAILY AS NEEDED    fexofenadine (ALLEGRA) 180 mg, Oral, DAILY    fluticasone (FLONASE) 50 MCG/ACT nasal spray 1 spray, Nasal, DAILY    levothyroxine (SYNTHROID) 100 mcg, Oral, DAILY    metoprolol succinate (TOPROL XL) 25 mg, Oral, DAILY    montelukast (SINGULAIR) 10 MG tablet TAKE 1 TABLET BY MOUTH EVERY NIGHT AT BEDTIME    pantoprazole (PROTONIX) 40 MG tablet TAKE 1 TABLET BY MOUTH TWICE DAILY    Premarin 0.5 g, Vaginal, DAILY    SUMAtriptan (IMITREX) 20 MG/ACT nasal spray USE 1 SPRAY NASALLY DAILY AS NEEDED FOR MIGRAINE    tiZANidine (ZANAFLEX) 4 mg, Oral, EVERY 8 HOURS PRN    valACYclovir (VALTREX) 500 mg, Oral    VITAMIN D, CHOLECALCIFEROL, PO 5,000 Units, Oral, DAILY     Family History   Problem Relation Age of Onset    High Blood Pressure Mother     Cancer Mother         BREAST    Dementia Mother     Early Death Father 35        accidental overdose    Substance Abuse Maternal Grandmother         ALCOHOL    Heart Disease Maternal Grandmother     High Blood Pressure Maternal Grandfather      LAST LABS  Lab Results   Component Value Date    LDLCALC 138 (H) 03/25/2021     Lab Results   Component Value Date    HDL 51 03/25/2021     Lab Results   Component Value Date    TRIG 77 03/25/2021     Lab Results   Component Value Date     03/25/2021    K 4.1 03/25/2021    CREATININE 0.8 03/25/2021     Lab Results   Component Value Date    WBC 11.7 (H) 02/09/2018    HGB 14.5 02/09/2018     02/09/2018     Lab Results   Component Value Date    ALT 6 (L) 03/25/2021    AST 15 03/25/2021    ALKPHOS 78 03/25/2021    BILITOT 0.3 03/25/2021     TSH (uIU/mL)   Date Value   03/25/2021 1.77     Lab Results   Component Value Date    GLUCOSE 94 03/25/2021     Lab Results   Component Value Date    LABA1C 5.5 05/26/2017     Objective:   PHYSICAL EXAM   /88 (Site: Left Upper Arm, Position: Sitting, Cuff Size: Medium Adult)   Pulse 75   Resp 18   Ht 5' 3\" (1.6 m)   Wt 172 lb (78 kg)   SpO2 98%   BMI 30.47 kg/m²   BP Readings from Last 5 Encounters:   05/04/22 132/88   04/28/22 134/68   10/28/21 126/86   09/27/21 137/87   05/07/21 (!) 149/99     Wt Readings from Last 5 Encounters:   05/04/22 172 lb (78 kg)   04/28/22 162 lb (73.5 kg)   10/28/21 162 lb (73.5 kg)   05/07/21 150 lb (68 kg)   04/21/21 159 lb (72.1 kg)      GENERAL:   · well-developed, well-nourished, alert, no distress. EYES:   · External findings: lids and lashes normal and conjunctivae and sclerae normal  · Eyes: no periorbital cellulitis. ENT:   · External nose and ears appear normal  · normal TM's and external ear canals both ears  · Pharynx: normal. Exudates: None  · Lips, mucosa, and tongue normal  · Hearing grossly normal.     NECK:   · Supple, symmetrical, trachea midline  · Thyroid not enlarged, symmetric, no tenderness/mass/nodules  LYMPH:  · no cervical nodes, no supraclavicular nodes  LUNGS:    · Breathing unlabored  · clear to auscultation bilaterally and good air movement  CARDIOVASC:   · regular rate and rhythm, S1, S2 normal. No murmur, click, rub or gallop  · Apical impulse normal  · LEGS:  Lower extremity edema: none    · No carotid bruits  ABDOMEN:   · Soft, non-tender, no masses  · No hepatosplenomegaly  · No hernias noted. Exam limited by N/A  SKIN: warm and dry  · No rashes or suspicious lesions  · No nodules or induration  PSYCH:    · Alert and oriented  · Normal reasoning, insight good  · Facial expressions full, mood appropriate  · No memory disturbance noted  MUSCULOSKEL:    · Gait normal, assistive device: none  · No significant finger or nail findings  · Spine symmetric, no deformities, no kyphosis      Assessment and Plan:      Diagnosis Orders   1. Encounter for well adult exam without abnormal findings  Comprehensive Metabolic Panel    Lipid Panel   2. Intractable migraine with aura with status migrainosus  SUMAtriptan (IMITREX) 20 MG/ACT nasal spray   3. Mild intermittent asthma, unspecified whether complicated     4. Acquired hypothyroidism  TSH   5. Essential hypertension  Comprehensive Metabolic Panel    Lipid Panel   6. Vitamin D deficiency  Vitamin D 25 Hydroxy   7. Need for shingles vaccine  Zoster Subunit (SHINGRIX)   8. Weight gain     Stable. Plan as above and below.     MA: Please recheck blood pressure and document in EPIC. Let me know if > 140/90 BEFORE they leave. Thanks! INSTRUCTIONS  NEXT APPOINTMENT: Please schedule check-up in 6 months with Valery Byers (Nurse Practitioner). · Dr. Watson Hand and Valery Byers (Nurse Practitioner) are sharing their practices as a team.  This will allow increased access to care. Office visits may alternate between these providers while we continue to maintain high quality of care. · PLEASE TAKE THIS FORM TO CHECK-OUT WINDOW TO SCHEDULE NEXT VISIT. PLEASE GET FASTING BLOODWORK DRAWN SOON. Lab is on first floor in [address]. Hours Monday to Friday 6:30 AM to 4 PM.   · Please get flu vaccine when available in fall. Can get either at this office or at stores such as [employer]. · Can schedule to get Pneumonia 20 vaccine in June with MA.  · Eat less, move more! You can do it!

## 2022-05-06 ENCOUNTER — TELEPHONE (OUTPATIENT)
Dept: FAMILY MEDICINE CLINIC | Age: 57
End: 2022-05-06

## 2022-05-06 NOTE — TELEPHONE ENCOUNTER
LOV 05/04/2022  Pt had second shingles shot   Pt has a hot spot hurts around injection site  to touch it is a  red [place] was the  size of a quarter now it is the size of a golf ball   Pt has a headache pt feels off but not bad   Pt wants to know is this normal?  Pt has reacted to flu shot in the past     Please advise what the pt should do?

## 2022-05-31 DIAGNOSIS — K21.9 GASTROESOPHAGEAL REFLUX DISEASE WITHOUT ESOPHAGITIS: ICD-10-CM

## 2022-05-31 RX ORDER — PANTOPRAZOLE SODIUM 40 MG/1
TABLET, DELAYED RELEASE ORAL
Qty: 180 TABLET | Refills: 1 | Status: SHIPPED | OUTPATIENT
Start: 2022-05-31

## 2022-06-09 DIAGNOSIS — E03.9 ACQUIRED HYPOTHYROIDISM: ICD-10-CM

## 2022-06-09 RX ORDER — LEVOTHYROXINE SODIUM 0.1 MG/1
100 TABLET ORAL DAILY
Qty: 90 TABLET | Refills: 0 | Status: SHIPPED | OUTPATIENT
Start: 2022-06-09 | End: 2022-09-01

## 2022-07-06 DIAGNOSIS — E03.9 ACQUIRED HYPOTHYROIDISM: ICD-10-CM

## 2022-07-06 DIAGNOSIS — Z00.00 ENCOUNTER FOR WELL ADULT EXAM WITHOUT ABNORMAL FINDINGS: ICD-10-CM

## 2022-07-06 DIAGNOSIS — I10 ESSENTIAL HYPERTENSION: ICD-10-CM

## 2022-07-06 DIAGNOSIS — E55.9 VITAMIN D DEFICIENCY: ICD-10-CM

## 2022-07-06 LAB
A/G RATIO: 1.8 (ref 1.1–2.2)
ALBUMIN SERPL-MCNC: 4.4 G/DL (ref 3.4–5)
ALP BLD-CCNC: 112 U/L (ref 40–129)
ALT SERPL-CCNC: 9 U/L (ref 10–40)
ANION GAP SERPL CALCULATED.3IONS-SCNC: 13 MMOL/L (ref 3–16)
AST SERPL-CCNC: 15 U/L (ref 15–37)
BILIRUB SERPL-MCNC: 0.3 MG/DL (ref 0–1)
BUN BLDV-MCNC: 10 MG/DL (ref 7–20)
CALCIUM SERPL-MCNC: 9.5 MG/DL (ref 8.3–10.6)
CHLORIDE BLD-SCNC: 100 MMOL/L (ref 99–110)
CHOLESTEROL, TOTAL: 212 MG/DL (ref 0–199)
CO2: 25 MMOL/L (ref 21–32)
CREAT SERPL-MCNC: 0.8 MG/DL (ref 0.6–1.1)
GFR AFRICAN AMERICAN: >60
GFR NON-AFRICAN AMERICAN: >60
GLUCOSE BLD-MCNC: 96 MG/DL (ref 70–99)
HDLC SERPL-MCNC: 52 MG/DL (ref 40–60)
LDL CHOLESTEROL CALCULATED: 130 MG/DL
POTASSIUM SERPL-SCNC: 4 MMOL/L (ref 3.5–5.1)
SODIUM BLD-SCNC: 138 MMOL/L (ref 136–145)
TOTAL PROTEIN: 6.8 G/DL (ref 6.4–8.2)
TRIGL SERPL-MCNC: 148 MG/DL (ref 0–150)
TSH SERPL DL<=0.05 MIU/L-ACNC: 2.33 UIU/ML (ref 0.27–4.2)
VITAMIN D 25-HYDROXY: 84 NG/ML
VLDLC SERPL CALC-MCNC: 30 MG/DL

## 2022-07-07 ENCOUNTER — OFFICE VISIT (OUTPATIENT)
Dept: FAMILY MEDICINE CLINIC | Age: 57
End: 2022-07-07
Payer: COMMERCIAL

## 2022-07-07 ENCOUNTER — TELEPHONE (OUTPATIENT)
Dept: FAMILY MEDICINE CLINIC | Age: 57
End: 2022-07-07

## 2022-07-07 VITALS
BODY MASS INDEX: 30.47 KG/M2 | OXYGEN SATURATION: 98 % | HEART RATE: 83 BPM | RESPIRATION RATE: 16 BRPM | SYSTOLIC BLOOD PRESSURE: 122 MMHG | HEIGHT: 63 IN | TEMPERATURE: 98 F | DIASTOLIC BLOOD PRESSURE: 84 MMHG

## 2022-07-07 DIAGNOSIS — K21.9 GASTROESOPHAGEAL REFLUX DISEASE WITHOUT ESOPHAGITIS: ICD-10-CM

## 2022-07-07 DIAGNOSIS — R05.9 COUGH: Primary | ICD-10-CM

## 2022-07-07 DIAGNOSIS — J30.2 SEASONAL ALLERGIES: ICD-10-CM

## 2022-07-07 DIAGNOSIS — J45.990 EXERCISE-INDUCED ASTHMA: ICD-10-CM

## 2022-07-07 PROCEDURE — 99214 OFFICE O/P EST MOD 30 MIN: CPT | Performed by: FAMILY MEDICINE

## 2022-07-07 RX ORDER — LEVOCETIRIZINE DIHYDROCHLORIDE 5 MG/1
5 TABLET, FILM COATED ORAL NIGHTLY
Qty: 30 TABLET | Refills: 2 | Status: SHIPPED | OUTPATIENT
Start: 2022-07-07

## 2022-07-07 RX ORDER — FLUTICASONE PROPIONATE 110 UG/1
2 AEROSOL, METERED RESPIRATORY (INHALATION) 2 TIMES DAILY
Qty: 12 G | Refills: 2 | Status: SHIPPED | OUTPATIENT
Start: 2022-07-07 | End: 2022-07-08

## 2022-07-07 RX ORDER — MELOXICAM 7.5 MG/1
7.5 TABLET ORAL DAILY
Qty: 30 TABLET | Refills: 0 | Status: SHIPPED | OUTPATIENT
Start: 2022-07-07 | End: 2022-08-05

## 2022-07-07 NOTE — PROGRESS NOTES
7/7/2022    This is a 60 y.o. female     HPI    Here for concerns for a cough. Present for a couple of months. Went away briefly when she traveled to [place]. [place] back [place] and was outside for a cookout. Seems to be worse when outside in the evening air.   - notes significant eye itching and dryness  - some sneezing. No significant nasal drainage  - took a home COVID test that was negative  Takes Allegra and Singulair for allergies. Zyrtec works better for her but makes her tired. Has tried Flonase previously  - some home improvement projects at home and it is more jesica there than usual  - cough is sometimes productive of phlegm    No fever or chills    - on Protonix daily. Notes recent worsening of reflux symptoms over past couple of weeks, but notes a difference with this cough. Previously dx with exercise-induced asthma    Non-smoker    Review of Systems     Current Outpatient Medications   Medication Sig Dispense Refill    levocetirizine (XYZAL) 5 MG tablet Take 1 tablet by mouth nightly 30 tablet 2    meloxicam (MOBIC) 7.5 MG tablet Take 1 tablet by mouth daily 30 tablet 0    levothyroxine (SYNTHROID) 100 MCG tablet Take 1 tablet by mouth daily 90 tablet 0    pantoprazole (PROTONIX) 40 MG tablet TAKE ONE TABLET BY MOUTH TWICE A  tablet 1    SUMAtriptan (IMITREX) 20 MG/ACT nasal spray USE 1 SPRAY NASALLY DAILY AS NEEDED FOR MIGRAINE 1 each 5    metoprolol succinate (TOPROL XL) 25 MG extended release tablet TAKE 1 TABLET BY MOUTH DAILY 90 tablet 1    dicyclomine (BENTYL) 20 MG tablet TAKE 1 TABLET BY MOUTH THREE TIMES DAILY AS NEEDED 90 tablet 3    butalbital-aspirin-caffeine (FIORINAL) -40 MG capsule TAKE 1 CAPSULE BY MOUTH EVERY 6 HOURS AS NEEDED FOR HEADACHE.  MAX 40 PER MONTH 40 capsule 2    montelukast (SINGULAIR) 10 MG tablet TAKE 1 TABLET BY MOUTH EVERY NIGHT AT BEDTIME 90 tablet 1    albuterol sulfate  (90 Base) MCG/ACT inhaler Inhale 2 puffs into the lungs every 4 hours as needed for Wheezing or Shortness of Breath 1 Inhaler 3    conjugated estrogens (PREMARIN) 0.625 MG/GM vaginal cream Place 0.5 g vaginally daily 1 Tube 3    tiZANidine (ZANAFLEX) 4 MG tablet Take 1 tablet by mouth every 8 hours as needed (back pain) 20 tablet 0    valACYclovir (VALTREX) 500 MG tablet Take 500 mg by mouth      Blood Pressure Monitor KIT Daily as needed 1 kit 0    calcium carbonate (OSCAL) 500 MG TABS tablet Take 500 mg by mouth daily      fluticasone (FLONASE) 50 MCG/ACT nasal spray 1 spray by Nasal route daily. 1 Bottle 6    fexofenadine (ALLEGRA) 180 MG tablet Take 1 tablet by mouth daily. 90 tablet 1    VITAMIN D, CHOLECALCIFEROL, PO Take 5,000 Units by mouth daily       fluticasone (FLOVENT HFA) 110 MCG/ACT inhaler INHALE 2 PUFFS INTO THE LUNGS TWICE DAILY 36 g 0     No current facility-administered medications for this visit. /84 (Site: Right Upper Arm, Position: Sitting, Cuff Size: Large Adult)   Pulse 83   Temp 98 °F (36.7 °C) (Oral)   Resp 16   Ht 5' 3\" (1.6 m)   SpO2 98%   BMI 30.47 kg/m²     Physical Exam  Constitutional:       Appearance: She is well-developed. HENT:      Head: Normocephalic and atraumatic. Right Ear: Tympanic membrane normal.      Left Ear: Tympanic membrane normal.      Nose: Rhinorrhea present. Mouth/Throat:      Comments: Mild pharyngeal erythema with postnasal drip  Cardiovascular:      Rate and Rhythm: Normal rate and regular rhythm. Heart sounds: Normal heart sounds. Pulmonary:      Effort: Pulmonary effort is normal.      Breath sounds: Normal breath sounds. Musculoskeletal:         General: No tenderness. Normal range of motion. Cervical back: Normal range of motion. Skin:     General: Skin is warm and dry. Findings: No rash. Neurological:      Mental Status: She is alert and oriented to person, place, and time. Deep Tendon Reflexes: Reflexes are normal and symmetric.          Wt Readings from Last 3 Encounters:   05/04/22 172 lb (78 kg)   04/28/22 162 lb (73.5 kg)   10/28/21 162 lb (73.5 kg)     BP Readings from Last 3 Encounters:   07/07/22 122/84   05/04/22 132/88   04/28/22 134/68     Assessment/Plan:  1. Cough    2. Seasonal allergies  - levocetirizine (XYZAL) 5 MG tablet; Take 1 tablet by mouth nightly  Dispense: 30 tablet; Refill: 2    3. Exercise-induced asthma    4. Gastroesophageal reflux disease without esophagitis    She does have many common causes for potential contributors to a chronic cough. We will switch to Xyzal for seasonal allergies and send in a steroid inhaler to help cover any form of cough variant asthma. Doubt GERD is contributing at this time. She is already on a PPI. If her symptoms or not improving in the next few weeks, I asked her to follow-up for more detailed work-up such as imaging and PFTs.

## 2022-07-08 RX ORDER — FLUTICASONE PROPIONATE 110 UG/1
AEROSOL, METERED RESPIRATORY (INHALATION)
Qty: 36 G | Refills: 0 | Status: SHIPPED | OUTPATIENT
Start: 2022-07-08 | End: 2022-08-09

## 2022-07-29 ENCOUNTER — TELEPHONE (OUTPATIENT)
Dept: FAMILY MEDICINE CLINIC | Age: 57
End: 2022-07-29

## 2022-07-29 NOTE — TELEPHONE ENCOUNTER
Pt needs to be seen to fu on cough if still having issues.   It is the end of the day so she could try going to urgent care if it's too bad over the weekend

## 2022-08-01 NOTE — TELEPHONE ENCOUNTER
Attempted to schedule. Patient does not think it's necessary to come back in. She stated Dr. Etienne Espana wanted her to get a chest x-ray if it didn't go away in 3 weeks. She stated she is feeling a little better but cough is not completely gone.     Please Advise

## 2022-08-02 DIAGNOSIS — G43.111 INTRACTABLE MIGRAINE WITH AURA WITH STATUS MIGRAINOSUS: ICD-10-CM

## 2022-08-02 NOTE — TELEPHONE ENCOUNTER
May need imaging but also needs appt for lung check  Per Dr Days note  \"If her symptoms or not improving in the next few weeks, I asked her to follow-up for more detailed work-up such as imaging and PFTs. \"

## 2022-08-02 NOTE — TELEPHONE ENCOUNTER
Patient wants to know if Dr. Berenice Solo still wants her to get a chest x-ray?  See message below    Please Advise

## 2022-08-03 RX ORDER — BUTALBITAL, ASPIRIN, AND CAFFEINE 325; 50; 40 MG/1; MG/1; MG/1
CAPSULE ORAL
Qty: 40 CAPSULE | OUTPATIENT
Start: 2022-08-03

## 2022-08-03 RX ORDER — BUTALBITAL, ASPIRIN, AND CAFFEINE 325; 50; 40 MG/1; MG/1; MG/1
CAPSULE ORAL
Qty: 40 CAPSULE | Refills: 2 | Status: SHIPPED | OUTPATIENT
Start: 2022-08-03 | End: 2022-11-01

## 2022-08-04 ENCOUNTER — HOSPITAL ENCOUNTER (OUTPATIENT)
Age: 57
Discharge: HOME | End: 2022-08-04
Payer: COMMERCIAL

## 2022-08-04 ENCOUNTER — OFFICE VISIT (OUTPATIENT)
Dept: FAMILY MEDICINE CLINIC | Age: 57
End: 2022-08-04
Payer: COMMERCIAL

## 2022-08-04 ENCOUNTER — HOSPITAL ENCOUNTER (OUTPATIENT)
Dept: GENERAL RADIOLOGY | Age: 57
Discharge: HOME | End: 2022-08-04
Payer: COMMERCIAL

## 2022-08-04 VITALS
RESPIRATION RATE: 18 BRPM | DIASTOLIC BLOOD PRESSURE: 80 MMHG | BODY MASS INDEX: 30.48 KG/M2 | HEART RATE: 89 BPM | SYSTOLIC BLOOD PRESSURE: 138 MMHG | HEIGHT: 63 IN | WEIGHT: 172 LBS | OXYGEN SATURATION: 97 %

## 2022-08-04 DIAGNOSIS — R05.3 CHRONIC COUGH: ICD-10-CM

## 2022-08-04 DIAGNOSIS — Z91.018 ALLERGY TO WHEAT: ICD-10-CM

## 2022-08-04 DIAGNOSIS — J30.2 SEASONAL ALLERGIES: Primary | ICD-10-CM

## 2022-08-04 PROCEDURE — 99213 OFFICE O/P EST LOW 20 MIN: CPT | Performed by: FAMILY MEDICINE

## 2022-08-04 PROCEDURE — 71046 X-RAY EXAM CHEST 2 VIEWS: CPT

## 2022-08-04 NOTE — PROGRESS NOTES
8/4/2022    This is a 60 y.o. female   Chief Complaint   Patient presents with    Follow-up     Pt is here for a follow up on cough, pt said she is not coughing as much but sill coughing. HPI    Here for follow up for a cough  - present for a few months. Got better on vacation out of town / [place]. Notes seasonal allergies that make it worse. - tried Flovent and this seemed to help some. She did have a more productive cough when using that. - switched from [facility] to [address] [place] [address], it makes her sleeps but this is improving. Taking Singulair as well. Reports cough is about 70% better    No fever or aches    GERD  - under good control on PPI BID    Review of Systems     Current Outpatient Medications   Medication Sig Dispense Refill    butalbital-aspirin-caffeine (FIORINAL) -40 MG capsule TAKE 1 CAPSULE BY MOUTH EVERY 6 HOURS AS NEEDED FOR HEADACHE.  MAX 40 PER MONTH 40 capsule 2    fluticasone (FLOVENT HFA) 110 MCG/ACT inhaler INHALE 2 PUFFS INTO THE LUNGS TWICE DAILY 36 g 0    levocetirizine (XYZAL) 5 MG tablet Take 1 tablet by mouth nightly 30 tablet 2    meloxicam (MOBIC) 7.5 MG tablet Take 1 tablet by mouth daily 30 tablet 0    levothyroxine (SYNTHROID) 100 MCG tablet Take 1 tablet by mouth daily 90 tablet 0    pantoprazole (PROTONIX) 40 MG tablet TAKE ONE TABLET BY MOUTH TWICE A  tablet 1    SUMAtriptan (IMITREX) 20 MG/ACT nasal spray USE 1 SPRAY NASALLY DAILY AS NEEDED FOR MIGRAINE 1 each 5    metoprolol succinate (TOPROL XL) 25 MG extended release tablet TAKE 1 TABLET BY MOUTH DAILY 90 tablet 1    dicyclomine (BENTYL) 20 MG tablet TAKE 1 TABLET BY MOUTH THREE TIMES DAILY AS NEEDED 90 tablet 3    montelukast (SINGULAIR) 10 MG tablet TAKE 1 TABLET BY MOUTH EVERY NIGHT AT BEDTIME 90 tablet 1    albuterol sulfate  (90 Base) MCG/ACT inhaler Inhale 2 puffs into the lungs every 4 hours as needed for Wheezing or Shortness of Breath 1 Inhaler 3    conjugated estrogens (PREMARIN) 0.625 MG/GM vaginal cream Place 0.5 g vaginally daily 1 Tube 3    tiZANidine (ZANAFLEX) 4 MG tablet Take 1 tablet by mouth every 8 hours as needed (back pain) 20 tablet 0    valACYclovir (VALTREX) 500 MG tablet Take 500 mg by mouth      Blood Pressure Monitor KIT Daily as needed 1 kit 0    calcium carbonate (OSCAL) 500 MG TABS tablet Take 500 mg by mouth daily      fluticasone (FLONASE) 50 MCG/ACT nasal spray 1 spray by Nasal route daily. 1 Bottle 6    fexofenadine (ALLEGRA) 180 MG tablet Take 1 tablet by mouth daily. 90 tablet 1    VITAMIN D, CHOLECALCIFEROL, PO Take 5,000 Units by mouth daily        No current facility-administered medications for this visit. /80 (Site: Right Upper Arm, Position: Sitting, Cuff Size: Medium Adult)   Pulse 89   Resp 18   Ht 5' 3\" (1.6 m)   Wt 172 lb (78 kg)   SpO2 97%   BMI 30.47 kg/m²     Physical Exam  Constitutional:       Appearance: She is well-developed. HENT:      Head: Normocephalic and atraumatic. Cardiovascular:      Rate and Rhythm: Normal rate and regular rhythm. Heart sounds: Normal heart sounds. Pulmonary:      Effort: Pulmonary effort is normal.      Breath sounds: Normal breath sounds. Musculoskeletal:         General: No tenderness. Normal range of motion. Cervical back: Normal range of motion. Skin:     General: Skin is warm and dry. Findings: No rash. Neurological:      Mental Status: She is alert and oriented to person, place, and time. Deep Tendon Reflexes: Reflexes are normal and symmetric. Wt Readings from Last 3 Encounters:   08/04/22 172 lb (78 kg)   05/04/22 172 lb (78 kg)   04/28/22 162 lb (73.5 kg)     BP Readings from Last 3 Encounters:   08/04/22 138/80   07/07/22 122/84   05/04/22 132/88     Assessment/Plan:  1. Seasonal allergies    2. Chronic cough  - XR CHEST (2 VW); Future    3.  Allergy to wheat    - Sedative side effects with Xyzal - decrease to half dose, 2.5mg daily  - Cough improving, likely allergy-related, will check CXR due to chronicity. Continue Flovent seasonally since it has been helpful  - Previously tested positive for wheat allergy with her allergist.  Discussed 4 to 6-week trial of avoiding wheat to see if it helps improve her other allergy symptoms as well.

## 2022-08-05 RX ORDER — MELOXICAM 7.5 MG/1
7.5 TABLET ORAL DAILY
Qty: 30 TABLET | Refills: 0 | Status: SHIPPED | OUTPATIENT
Start: 2022-08-05 | End: 2022-09-01

## 2022-08-09 DIAGNOSIS — J45.990 EXERCISE-INDUCED ASTHMA: ICD-10-CM

## 2022-08-09 RX ORDER — FLUTICASONE PROPIONATE 110 UG/1
AEROSOL, METERED RESPIRATORY (INHALATION)
Qty: 36 G | Refills: 0 | Status: SHIPPED | OUTPATIENT
Start: 2022-08-09

## 2022-09-01 DIAGNOSIS — K58.9 IRRITABLE BOWEL SYNDROME, UNSPECIFIED TYPE: ICD-10-CM

## 2022-09-01 DIAGNOSIS — E03.9 ACQUIRED HYPOTHYROIDISM: ICD-10-CM

## 2022-09-01 DIAGNOSIS — G43.111 INTRACTABLE MIGRAINE WITH AURA WITH STATUS MIGRAINOSUS: ICD-10-CM

## 2022-09-01 RX ORDER — MELOXICAM 7.5 MG/1
7.5 TABLET ORAL DAILY
Qty: 30 TABLET | Refills: 2 | Status: SHIPPED | OUTPATIENT
Start: 2022-09-01

## 2022-09-01 RX ORDER — DICYCLOMINE HCL 20 MG
TABLET ORAL
Qty: 270 TABLET | Refills: 1 | Status: SHIPPED | OUTPATIENT
Start: 2022-09-01

## 2022-09-01 RX ORDER — LEVOTHYROXINE SODIUM 0.1 MG/1
100 TABLET ORAL DAILY
Qty: 90 TABLET | Refills: 0 | Status: SHIPPED | OUTPATIENT
Start: 2022-09-01 | End: 2022-11-15 | Stop reason: SDUPTHER

## 2022-09-02 RX ORDER — METOPROLOL SUCCINATE 25 MG/1
25 TABLET, EXTENDED RELEASE ORAL DAILY
Qty: 90 TABLET | Refills: 1 | Status: SHIPPED | OUTPATIENT
Start: 2022-09-02

## 2022-11-08 DIAGNOSIS — J30.9 ALLERGIC RHINITIS, UNSPECIFIED SEASONALITY, UNSPECIFIED TRIGGER: ICD-10-CM

## 2022-11-08 RX ORDER — MONTELUKAST SODIUM 10 MG/1
TABLET ORAL
Qty: 90 TABLET | Refills: 1 | Status: SHIPPED | OUTPATIENT
Start: 2022-11-08

## 2022-11-15 ENCOUNTER — OFFICE VISIT (OUTPATIENT)
Dept: FAMILY MEDICINE CLINIC | Age: 57
End: 2022-11-15
Payer: COMMERCIAL

## 2022-11-15 ENCOUNTER — TELEPHONE (OUTPATIENT)
Dept: FAMILY MEDICINE CLINIC | Age: 57
End: 2022-11-15

## 2022-11-15 VITALS
SYSTOLIC BLOOD PRESSURE: 140 MMHG | WEIGHT: 177 LBS | HEART RATE: 84 BPM | DIASTOLIC BLOOD PRESSURE: 94 MMHG | HEIGHT: 63 IN | OXYGEN SATURATION: 98 % | BODY MASS INDEX: 31.36 KG/M2 | TEMPERATURE: 98 F

## 2022-11-15 DIAGNOSIS — M17.0 PRIMARY OSTEOARTHRITIS OF BOTH KNEES: ICD-10-CM

## 2022-11-15 DIAGNOSIS — G43.111 INTRACTABLE MIGRAINE WITH AURA WITH STATUS MIGRAINOSUS: ICD-10-CM

## 2022-11-15 DIAGNOSIS — F39 EPISODIC MOOD DISORDER (HCC): ICD-10-CM

## 2022-11-15 DIAGNOSIS — I10 ESSENTIAL HYPERTENSION: Primary | ICD-10-CM

## 2022-11-15 DIAGNOSIS — K58.9 IRRITABLE BOWEL SYNDROME, UNSPECIFIED TYPE: ICD-10-CM

## 2022-11-15 DIAGNOSIS — Z23 NEEDS FLU SHOT: ICD-10-CM

## 2022-11-15 DIAGNOSIS — K21.9 GASTROESOPHAGEAL REFLUX DISEASE WITHOUT ESOPHAGITIS: ICD-10-CM

## 2022-11-15 DIAGNOSIS — E03.9 ACQUIRED HYPOTHYROIDISM: ICD-10-CM

## 2022-11-15 DIAGNOSIS — E78.5 HYPERLIPIDEMIA WITH TARGET LDL LESS THAN 130: ICD-10-CM

## 2022-11-15 PROCEDURE — 99214 OFFICE O/P EST MOD 30 MIN: CPT

## 2022-11-15 PROCEDURE — 90471 IMMUNIZATION ADMIN: CPT

## 2022-11-15 PROCEDURE — 3074F SYST BP LT 130 MM HG: CPT

## 2022-11-15 PROCEDURE — 3078F DIAST BP <80 MM HG: CPT

## 2022-11-15 PROCEDURE — 90674 CCIIV4 VAC NO PRSV 0.5 ML IM: CPT

## 2022-11-15 RX ORDER — MELOXICAM 7.5 MG/1
7.5 TABLET ORAL DAILY
Qty: 90 TABLET | Refills: 1 | Status: SHIPPED | OUTPATIENT
Start: 2022-11-15

## 2022-11-15 RX ORDER — BUTALBITAL, ASPIRIN, AND CAFFEINE 325; 50; 40 MG/1; MG/1; MG/1
CAPSULE ORAL
Qty: 40 CAPSULE | Refills: 2 | Status: SHIPPED | OUTPATIENT
Start: 2022-11-15 | End: 2023-02-27

## 2022-11-15 RX ORDER — LEVOTHYROXINE SODIUM 0.1 MG/1
100 TABLET ORAL DAILY
Qty: 90 TABLET | Refills: 1 | Status: SHIPPED | OUTPATIENT
Start: 2022-11-15

## 2022-11-15 RX ORDER — PANTOPRAZOLE SODIUM 40 MG/1
TABLET, DELAYED RELEASE ORAL
Qty: 180 TABLET | Refills: 1 | Status: SHIPPED | OUTPATIENT
Start: 2022-11-15

## 2022-11-15 ASSESSMENT — ENCOUNTER SYMPTOMS
SHORTNESS OF BREATH: 0
ABDOMINAL PAIN: 0

## 2022-11-15 NOTE — TELEPHONE ENCOUNTER
Pt calling in. She saw baljit this morning and talked about getting ozempic. Pt called her insurance and they will cover ozempic.      [facility]    Please Advise  Pt can be reached at [phone]

## 2022-11-15 NOTE — TELEPHONE ENCOUNTER
Sent in- 0.25mg weekly for 4 weeks. I need her to see me back just to check in on how the medication is working in 4 weeks. Please help her to schedule.  Thanks

## 2022-11-15 NOTE — PROGRESS NOTES
11/15/2022    This is a 59 y.o. female   Chief Complaint   Patient presents with    Hypertension     6 mo f/u BP check   . HPI  Here for routine 6 month follow up    Cough- has improved. Has been having dry wall work done at her house and noticed that this seemed to have been causing her cough. She has taken steps to minimize the dry wall dust as this work continues which has subsequently improved her cough. She is frustrated because she can't lose weight- trying to get back into exercising now that her knee pain is improving on meloxicam and with home therapy exercises  Has been eating 'junk'  Was using the [employer] kyle, but has stopped    GERD- manageable, but has worsened with weight gain    Hypertension:  Home blood pressure monitoring: No. Patient denies chest pain, shortness of breath, headache, lightheadedness, blurred vision, peripheral edema, palpitations, dry cough, and fatigue. Antihypertensive medication side effects: no medication side effects noted. Use of agents associated with hypertension: NSAIDS and thyroid hormones.                                         Sodium (mmol/L)   Date Value   07/06/2022 138    BUN (mg/dL)   Date Value   07/06/2022 10    Glucose (mg/dL)   Date Value   07/06/2022 96      Potassium (mmol/L)   Date Value   07/06/2022 4.0    Creatinine (mg/dL)   Date Value   07/06/2022 0.8         Hyperlipidemia:  Not on medications  The 10-year ASCVD risk score (Bib BORREGO, et al., 2019) is: 3.9%    Values used to calculate the score:      Age: 59 years      Sex: Female      Is Non- : No      Diabetic: No      Tobacco smoker: No      Systolic Blood Pressure: 302 mmHg      Is BP treated: No      HDL Cholesterol: 52 mg/dL      Total Cholesterol: 212 mg/dL    Lab Results   Component Value Date    CHOL 212 (H) 07/06/2022    TRIG 148 07/06/2022    HDL 52 07/06/2022    LDLCALC 130 (H) 07/06/2022     Lab Results   Component Value Date    ALT 9 (L) 07/06/2022    AST 15 07/06/2022           Patient Active Problem List   Diagnosis    Plantar fasciitis    Menopausal syndrome    Seborrheic keratosis    Episodic mood disorder (HCC)    IBS (irritable bowel syndrome)    Asthma    Essential hypertension    GERD (gastroesophageal reflux disease)    Allergic rhinitis    Migraine with aura    Cervical dysplasia- S/P hyst, PAPs per gyn    Acquired hypothyroidism    Vitamin D deficiency    Chronic sinusitis    Osteopenia    Sacroiliac joint dysfunction of left side    Hyperlipidemia with target LDL less than 130    AK (actinic keratosis)    Biliary dyskinesia    Primary osteoarthritis of both knees    Hx of skin cancer, basal cell    Family history of breast cancer in mother    BMI 31.0-31.9,adult          Current Outpatient Medications   Medication Sig Dispense Refill    meloxicam (MOBIC) 7.5 MG tablet Take 1 tablet by mouth daily 90 tablet 1    levothyroxine (SYNTHROID) 100 MCG tablet Take 1 tablet by mouth daily 90 tablet 1    butalbital-aspirin-caffeine (FIORINAL) -40 MG capsule TAKE 1 CAPSULE BY MOUTH EVERY 6 HOURS AS NEEDED FOR HEADACHE.  MAX 40 PER MONTH 40 capsule 2    pantoprazole (PROTONIX) 40 MG tablet TAKE ONE TABLET BY MOUTH TWICE A  tablet 1    montelukast (SINGULAIR) 10 MG tablet TAKE 1 TABLET BY MOUTH EVERY NIGHT AT BEDTIME 90 tablet 1    metoprolol succinate (TOPROL XL) 25 MG extended release tablet Take 1 tablet by mouth daily 90 tablet 1    dicyclomine (BENTYL) 20 MG tablet TAKE 1 TABLET BY MOUTH THREE TIMES DAILY AS NEEDED 270 tablet 1    levocetirizine (XYZAL) 5 MG tablet Take 1 tablet by mouth nightly 30 tablet 2    SUMAtriptan (IMITREX) 20 MG/ACT nasal spray USE 1 SPRAY NASALLY DAILY AS NEEDED FOR MIGRAINE 1 each 5    conjugated estrogens (PREMARIN) 0.625 MG/GM vaginal cream Place 0.5 g vaginally daily 1 Tube 3    valACYclovir (VALTREX) 500 MG tablet Take 500 mg by mouth      calcium carbonate (OSCAL) 500 MG TABS tablet Take 500 mg by mouth daily      VITAMIN D, CHOLECALCIFEROL, PO Take 5,000 Units by mouth daily       fluticasone (FLOVENT HFA) 110 MCG/ACT inhaler INHALE 2 PUFFS INTO THE LUNGS TWICE DAILY (Patient not taking: Reported on 11/15/2022) 36 g 0    albuterol sulfate  (90 Base) MCG/ACT inhaler Inhale 2 puffs into the lungs every 4 hours as needed for Wheezing or Shortness of Breath (Patient not taking: Reported on 11/15/2022) 1 Inhaler 3    Blood Pressure Monitor KIT Daily as needed (Patient not taking: Reported on 11/15/2022) 1 kit 0    fluticasone (FLONASE) 50 MCG/ACT nasal spray 1 spray by Nasal route daily. (Patient not taking: Reported on 11/15/2022) 1 Bottle 6     No current facility-administered medications for this visit. Allergies   Allergen Reactions    Adhesive Tape     Ceftin [Cefuroxime Axetil]      Rash   10/27/17 severe rash    Inderal [Propranolol Hcl] Other (See Comments)     Not effective    Macrobid [Nitrofurantoin Monohydrate Macrocrystals] Nausea Only    Phenergan [Promethazine Hcl] Other (See Comments)     Overstim    Prozac [Fluoxetine Hcl] Other (See Comments)     sedation    Ultram [Tramadol Hcl] Itching, Nausea Only and Other (See Comments)     Not effective    Biaxin [Clarithromycin] Nausea And Vomiting    Cipro Xr Nausea And Vomiting    Darvocet [Propoxyphene N-Acetaminophen] Nausea And Vomiting    Sulfa Antibiotics Nausea And Vomiting    Sulfacetamide Sodium Nausea And Vomiting    Sulfamethoxazole-Trimethoprim Nausea Only and Rash       Review of Systems   Constitutional:  Negative for activity change and fever. Respiratory:  Negative for shortness of breath. Cardiovascular:  Negative for chest pain, palpitations and leg swelling. Gastrointestinal:  Negative for abdominal pain. Neurological:  Negative for dizziness and headaches.      Vitals:    11/15/22 1005 11/15/22 1058   BP: (!) 122/94 (!) 140/94   Site: Left Upper Arm Left Upper Arm   Position: Sitting Sitting   Cuff Size: Large Adult Large Adult   Pulse: 84    Temp: 98 °F (36.7 °C)    TempSrc: Oral    SpO2: 98%    Weight: 177 lb (80.3 kg)    Height: 5' 3\" (1.6 m)        Body mass index is 31.35 kg/m². Wt Readings from Last 3 Encounters:   11/15/22 177 lb (80.3 kg)   08/04/22 172 lb (78 kg)   05/04/22 172 lb (78 kg)       BP Readings from Last 3 Encounters:   11/15/22 (!) 140/94   08/04/22 138/80   07/07/22 122/84       Physical Exam  Vitals reviewed. Constitutional:       General: She is not in acute distress. Appearance: Normal appearance. She is obese. HENT:      Head: Normocephalic and atraumatic. Cardiovascular:      Rate and Rhythm: Normal rate and regular rhythm. Heart sounds: Normal heart sounds. No murmur heard. No friction rub. No gallop. Pulmonary:      Effort: Pulmonary effort is normal. No respiratory distress. Breath sounds: Normal breath sounds. Musculoskeletal:         General: Normal range of motion. Right lower leg: No edema. Left lower leg: No edema. Skin:     General: Skin is warm and dry. Neurological:      Mental Status: She is oriented to person, place, and time. Psychiatric:         Behavior: Behavior normal.         Thought Content: Thought content normal.         Judgment: Judgment normal.       Assessmentand Plan  ANAND was seen today for hypertension. Diagnoses and all orders for this visit:    Essential hypertension  Elevated in office today, will have her come in for repeat BP check in 2 weeks  Hyperlipidemia with target LDL less than 130  Not on medications  Last  in 7/2022  Advised low fat diet, exercise and weight loss  Acquired hypothyroidism  -     levothyroxine (SYNTHROID) 100 MCG tablet; Take 1 tablet by mouth daily  Well controlled, tolerating medications, no changes  Primary osteoarthritis of both knees  -     meloxicam (MOBIC) 7.5 MG tablet;  Take 1 tablet by mouth daily  Well controlled with daily meloxicam and daily home exercises  Gastroesophageal reflux disease without esophagitis  -     pantoprazole (PROTONIX) 40 MG tablet; TAKE ONE TABLET BY MOUTH TWICE A DAY  Manageable with BID protonix  Discussed role of weight gain with increased symptoms  Advised low fat diet, exercise and weight loss  Irritable bowel syndrome, unspecified type  Constipation better since having gallbladder removed  Episodic mood disorder (HCC)  Stable- not on medications  Intractable migraine with aura with status migrainosus  -     butalbital-aspirin-caffeine (FIORINAL) -40 MG capsule; TAKE 1 CAPSULE BY MOUTH EVERY 6 HOURS AS NEEDED FOR HEADACHE.  MAX 40 PER MONTH  Stable with PRN fiorinal  BMI 31.0-31.9,adult  Interested in medical weight loss- provided information on semaglutide so she can check with insurance  Also provided information on weight loss tips  Advised setting realistic goals (smaller portions, daily exercise)  Needs flu shot  -     Influenza, FLUCELVAX, (age 10 mo+), IM, Preservative Free, 0.5 mL      Return in about 6 months (around 5/15/2023) for physical.

## 2022-11-16 ENCOUNTER — TELEPHONE (OUTPATIENT)
Dept: FAMILY MEDICINE CLINIC | Age: 57
End: 2022-11-16

## 2022-11-16 NOTE — TELEPHONE ENCOUNTER
Informed her of this message. She said she wanted the med sent to [facility]. I told her to check around to see if any pharmacy has the med. , then we can send it there. She cancelled her appt for 4 weeks till she gets the med.

## 2022-11-16 NOTE — TELEPHONE ENCOUNTER
Pt was seen yesterday she was put on ozempic she was told by pharmacy it is on back order she was told by Topher E Alex Schuler has to be a  higher dose she can start at that level if not need to change the prescription   Pt said we can try [facility] first if not then [facility]         Please advise on medication

## 2022-11-16 NOTE — TELEPHONE ENCOUNTER
Pt received a call from office. Did not see a specific note.     Please reach out to pt at [phone]

## 2022-11-16 NOTE — TELEPHONE ENCOUNTER
Sent to [facility]- likely it wont be available there either. May need to try a different medication or wait until it is back in stock. This is for weight management, so not an urgent need to start. I do not want her starting at a higher dose initially.   Thanks

## 2023-01-20 ENCOUNTER — TELEPHONE (OUTPATIENT)
Dept: FAMILY MEDICINE CLINIC | Age: 58
End: 2023-01-20

## 2023-01-20 DIAGNOSIS — U07.1 COVID-19: Primary | ICD-10-CM

## 2023-01-20 NOTE — TELEPHONE ENCOUNTER
Patient called in stating that she tested positive for COVID this morning. Her parents have Rhonda too.  She has a headache, body aches, runny nose, congestion, sore throat    She would like to know if she can get the PAXLOVID as she said that's what her parents got    Please Advise

## 2023-01-20 NOTE — TELEPHONE ENCOUNTER
Informed of this, she will get med and stay isolated for 5 days, then mask if has to go out for the next 5 days.

## 2023-01-21 ENCOUNTER — TELEPHONE (OUTPATIENT)
Dept: FAMILY MEDICINE CLINIC | Age: 58
End: 2023-01-21

## 2023-01-21 RX ORDER — ONDANSETRON 4 MG/1
4 TABLET, FILM COATED ORAL 3 TIMES DAILY PRN
Qty: 15 TABLET | Refills: 0 | Status: SHIPPED | OUTPATIENT
Start: 2023-01-21

## 2023-01-21 NOTE — TELEPHONE ENCOUNTER
On call note:  Patient called and reported that she has COVID-19 and taking paxlovid. She is having significant nausea and would like to have zofran sent to her pharmacy. Rx sent. Denies shortness of breath or high fever. Advised to stay hydrated with water. Person diet. Call if symptoms are worsening.

## 2023-01-26 DIAGNOSIS — K21.9 GASTROESOPHAGEAL REFLUX DISEASE WITHOUT ESOPHAGITIS: ICD-10-CM

## 2023-01-26 RX ORDER — PANTOPRAZOLE SODIUM 40 MG/1
TABLET, DELAYED RELEASE ORAL
Qty: 180 TABLET | Refills: 1 | Status: SHIPPED | OUTPATIENT
Start: 2023-01-26

## 2023-03-17 LAB — MAMMOGRAPHY, EXTERNAL: NORMAL

## 2023-04-08 DIAGNOSIS — G43.111 INTRACTABLE MIGRAINE WITH AURA WITH STATUS MIGRAINOSUS: ICD-10-CM

## 2023-04-10 RX ORDER — METOPROLOL SUCCINATE 25 MG/1
25 TABLET, EXTENDED RELEASE ORAL DAILY
Qty: 90 TABLET | Refills: 1 | OUTPATIENT
Start: 2023-04-10

## 2023-04-17 SDOH — ECONOMIC STABILITY: FOOD INSECURITY: WITHIN THE PAST 12 MONTHS, THE FOOD YOU BOUGHT JUST DIDN'T LAST AND YOU DIDN'T HAVE MONEY TO GET MORE.: NEVER TRUE

## 2023-04-17 SDOH — ECONOMIC STABILITY: HOUSING INSECURITY
IN THE LAST 12 MONTHS, WAS THERE A TIME WHEN YOU DID NOT HAVE A STEADY PLACE TO SLEEP OR SLEPT IN A SHELTER (INCLUDING NOW)?: NO

## 2023-04-17 SDOH — ECONOMIC STABILITY: INCOME INSECURITY: HOW HARD IS IT FOR YOU TO PAY FOR THE VERY BASICS LIKE FOOD, HOUSING, MEDICAL CARE, AND HEATING?: NOT VERY HARD

## 2023-04-17 SDOH — ECONOMIC STABILITY: FOOD INSECURITY: WITHIN THE PAST 12 MONTHS, YOU WORRIED THAT YOUR FOOD WOULD RUN OUT BEFORE YOU GOT MONEY TO BUY MORE.: NEVER TRUE

## 2023-04-17 SDOH — ECONOMIC STABILITY: TRANSPORTATION INSECURITY
IN THE PAST 12 MONTHS, HAS LACK OF TRANSPORTATION KEPT YOU FROM MEETINGS, WORK, OR FROM GETTING THINGS NEEDED FOR DAILY LIVING?: NO

## 2023-04-19 ENCOUNTER — OFFICE VISIT (OUTPATIENT)
Dept: FAMILY MEDICINE CLINIC | Age: 58
End: 2023-04-19
Payer: COMMERCIAL

## 2023-04-19 VITALS
BODY MASS INDEX: 30.3 KG/M2 | WEIGHT: 171 LBS | DIASTOLIC BLOOD PRESSURE: 88 MMHG | SYSTOLIC BLOOD PRESSURE: 134 MMHG | HEART RATE: 97 BPM | HEIGHT: 63 IN | OXYGEN SATURATION: 98 %

## 2023-04-19 DIAGNOSIS — F39 EPISODIC MOOD DISORDER (HCC): ICD-10-CM

## 2023-04-19 PROCEDURE — 3078F DIAST BP <80 MM HG: CPT

## 2023-04-19 PROCEDURE — 99213 OFFICE O/P EST LOW 20 MIN: CPT

## 2023-04-19 PROCEDURE — 3074F SYST BP LT 130 MM HG: CPT

## 2023-04-19 ASSESSMENT — PATIENT HEALTH QUESTIONNAIRE - PHQ9
1. LITTLE INTEREST OR PLEASURE IN DOING THINGS: 0
2. FEELING DOWN, DEPRESSED OR HOPELESS: 0
SUM OF ALL RESPONSES TO PHQ QUESTIONS 1-9: 0
SUM OF ALL RESPONSES TO PHQ9 QUESTIONS 1 & 2: 0

## 2023-04-19 NOTE — PROGRESS NOTES
NEEDED 270 tablet 1    levocetirizine (XYZAL) 5 MG tablet Take 1 tablet by mouth nightly 30 tablet 2    SUMAtriptan (IMITREX) 20 MG/ACT nasal spray USE 1 SPRAY NASALLY DAILY AS NEEDED FOR MIGRAINE 1 each 5    albuterol sulfate  (90 Base) MCG/ACT inhaler Inhale 2 puffs into the lungs every 4 hours as needed for Wheezing or Shortness of Breath 1 Inhaler 3    valACYclovir (VALTREX) 500 MG tablet Take 1 tablet by mouth      Blood Pressure Monitor KIT Daily as needed 1 kit 0    calcium carbonate (OSCAL) 500 MG TABS tablet Take 1 tablet by mouth daily      fluticasone (FLONASE) 50 MCG/ACT nasal spray 1 spray by Nasal route daily. 1 Bottle 6    VITAMIN D, CHOLECALCIFEROL, PO Take 5,000 Units by mouth daily       ondansetron (ZOFRAN) 4 MG tablet Take 1 tablet by mouth 3 times daily as needed for Nausea or Vomiting (Patient not taking: Reported on 4/19/2023) 15 tablet 0    butalbital-aspirin-caffeine (FIORINAL) -40 MG capsule TAKE 1 CAPSULE BY MOUTH EVERY 6 HOURS AS NEEDED FOR HEADACHE. MAX 40 PER MONTH (Patient not taking: Reported on 4/19/2023) 40 capsule 2    fluticasone (FLOVENT HFA) 110 MCG/ACT inhaler INHALE 2 PUFFS INTO THE LUNGS TWICE DAILY (Patient not taking: Reported on 11/15/2022) 36 g 0     No current facility-administered medications for this visit.        Allergies   Allergen Reactions    Adhesive Tape     Ceftin [Cefuroxime Axetil]      Rash   10/27/17 severe rash    Inderal [Propranolol Hcl] Other (See Comments)     Not effective    Macrobid [Nitrofurantoin Monohydrate Macrocrystals] Nausea Only    Phenergan [Promethazine Hcl] Other (See Comments)     Overstim    Prozac [Fluoxetine Hcl] Other (See Comments)     sedation    Ultram [Tramadol Hcl] Itching, Nausea Only and Other (See Comments)     Not effective    Biaxin [Clarithromycin] Nausea And Vomiting    Cipro Xr Nausea And Vomiting    Darvocet [Propoxyphene N-Acetaminophen] Nausea And Vomiting    Sulfa Antibiotics Nausea And Vomiting

## 2023-04-20 ASSESSMENT — ENCOUNTER SYMPTOMS
ABDOMINAL PAIN: 0
SHORTNESS OF BREATH: 0

## 2023-04-22 ENCOUNTER — TELEPHONE (OUTPATIENT)
Dept: FAMILY MEDICINE CLINIC | Age: 58
End: 2023-04-22

## 2023-04-22 RX ORDER — TIZANIDINE 4 MG/1
TABLET ORAL
Qty: 20 TABLET | Refills: 0 | Status: SHIPPED | OUTPATIENT
Start: 2023-04-22

## 2023-05-01 DIAGNOSIS — E03.9 ACQUIRED HYPOTHYROIDISM: ICD-10-CM

## 2023-05-01 DIAGNOSIS — M17.0 PRIMARY OSTEOARTHRITIS OF BOTH KNEES: ICD-10-CM

## 2023-05-01 RX ORDER — MELOXICAM 7.5 MG/1
7.5 TABLET ORAL DAILY
Qty: 90 TABLET | Refills: 1 | Status: SHIPPED | OUTPATIENT
Start: 2023-05-01

## 2023-05-01 RX ORDER — LEVOTHYROXINE SODIUM 0.1 MG/1
100 TABLET ORAL DAILY
Qty: 90 TABLET | Refills: 1 | Status: SHIPPED | OUTPATIENT
Start: 2023-05-01

## 2023-05-11 DIAGNOSIS — J30.9 ALLERGIC RHINITIS, UNSPECIFIED SEASONALITY, UNSPECIFIED TRIGGER: ICD-10-CM

## 2023-05-11 RX ORDER — MONTELUKAST SODIUM 10 MG/1
TABLET ORAL
Qty: 90 TABLET | Refills: 1 | Status: SHIPPED | OUTPATIENT
Start: 2023-05-11

## 2023-05-18 DIAGNOSIS — G43.111 INTRACTABLE MIGRAINE WITH AURA WITH STATUS MIGRAINOSUS: ICD-10-CM

## 2023-05-18 RX ORDER — BUTALBITAL, ASPIRIN, AND CAFFEINE 325; 50; 40 MG/1; MG/1; MG/1
CAPSULE ORAL
Qty: 40 CAPSULE | Refills: 0 | Status: SHIPPED | OUTPATIENT
Start: 2023-05-18 | End: 2023-07-21

## 2023-06-02 ENCOUNTER — OFFICE VISIT (OUTPATIENT)
Dept: FAMILY MEDICINE CLINIC | Age: 58
End: 2023-06-02
Payer: COMMERCIAL

## 2023-06-02 VITALS
WEIGHT: 166 LBS | DIASTOLIC BLOOD PRESSURE: 88 MMHG | BODY MASS INDEX: 29.41 KG/M2 | OXYGEN SATURATION: 97 % | HEART RATE: 78 BPM | HEIGHT: 63 IN | SYSTOLIC BLOOD PRESSURE: 128 MMHG

## 2023-06-02 DIAGNOSIS — M17.0 PRIMARY OSTEOARTHRITIS OF BOTH KNEES: ICD-10-CM

## 2023-06-02 DIAGNOSIS — M53.3 SACROILIAC JOINT DYSFUNCTION OF LEFT SIDE: ICD-10-CM

## 2023-06-02 DIAGNOSIS — N95.1 MENOPAUSAL STATE: ICD-10-CM

## 2023-06-02 DIAGNOSIS — K58.9 IRRITABLE BOWEL SYNDROME, UNSPECIFIED TYPE: ICD-10-CM

## 2023-06-02 DIAGNOSIS — Z85.828 HX OF SKIN CANCER, BASAL CELL: ICD-10-CM

## 2023-06-02 DIAGNOSIS — I10 ESSENTIAL HYPERTENSION: ICD-10-CM

## 2023-06-02 DIAGNOSIS — K21.9 GASTROESOPHAGEAL REFLUX DISEASE WITHOUT ESOPHAGITIS: ICD-10-CM

## 2023-06-02 DIAGNOSIS — Z00.00 ENCOUNTER FOR WELL ADULT EXAM WITHOUT ABNORMAL FINDINGS: Primary | ICD-10-CM

## 2023-06-02 DIAGNOSIS — E55.9 VITAMIN D DEFICIENCY: ICD-10-CM

## 2023-06-02 DIAGNOSIS — E03.9 ACQUIRED HYPOTHYROIDISM: ICD-10-CM

## 2023-06-02 DIAGNOSIS — Z80.3 FAMILY HISTORY OF BREAST CANCER IN MOTHER: ICD-10-CM

## 2023-06-02 DIAGNOSIS — G43.109 MIGRAINE WITH AURA AND WITHOUT STATUS MIGRAINOSUS, NOT INTRACTABLE: ICD-10-CM

## 2023-06-02 DIAGNOSIS — E78.5 HYPERLIPIDEMIA WITH TARGET LDL LESS THAN 130: ICD-10-CM

## 2023-06-02 LAB
25(OH)D3 SERPL-MCNC: 107.1 NG/ML
ALBUMIN SERPL-MCNC: 4.5 G/DL (ref 3.4–5)
ALBUMIN/GLOB SERPL: 1.8 {RATIO} (ref 1.1–2.2)
ALP SERPL-CCNC: 101 U/L (ref 40–129)
ALT SERPL-CCNC: 9 U/L (ref 10–40)
ANION GAP SERPL CALCULATED.3IONS-SCNC: 12 MMOL/L (ref 3–16)
AST SERPL-CCNC: 15 U/L (ref 15–37)
BILIRUB SERPL-MCNC: 0.5 MG/DL (ref 0–1)
BUN SERPL-MCNC: 15 MG/DL (ref 7–20)
CALCIUM SERPL-MCNC: 9.7 MG/DL (ref 8.3–10.6)
CHLORIDE SERPL-SCNC: 102 MMOL/L (ref 99–110)
CHOLEST SERPL-MCNC: 212 MG/DL (ref 0–199)
CO2 SERPL-SCNC: 25 MMOL/L (ref 21–32)
CREAT SERPL-MCNC: 1 MG/DL (ref 0.6–1.1)
GFR SERPLBLD CREATININE-BSD FMLA CKD-EPI: >60 ML/MIN/{1.73_M2}
GLUCOSE SERPL-MCNC: 94 MG/DL (ref 70–99)
HDLC SERPL-MCNC: 59 MG/DL (ref 40–60)
LDLC SERPL CALC-MCNC: 134 MG/DL
POTASSIUM SERPL-SCNC: 4 MMOL/L (ref 3.5–5.1)
PROT SERPL-MCNC: 7 G/DL (ref 6.4–8.2)
SODIUM SERPL-SCNC: 139 MMOL/L (ref 136–145)
TRIGL SERPL-MCNC: 96 MG/DL (ref 0–150)
TSH SERPL DL<=0.005 MIU/L-ACNC: 1.27 UIU/ML (ref 0.27–4.2)
VLDLC SERPL CALC-MCNC: 19 MG/DL

## 2023-06-02 PROCEDURE — 3074F SYST BP LT 130 MM HG: CPT

## 2023-06-02 PROCEDURE — 99396 PREV VISIT EST AGE 40-64: CPT

## 2023-06-02 PROCEDURE — 3079F DIAST BP 80-89 MM HG: CPT

## 2023-06-02 NOTE — PROGRESS NOTES
History and Physical      Siria Adams  YOB: 1965    Date of Service:  6/2/2023    Chief Complaint:   Siria Adams is a 59 y.o. female who presents for complete physical examination. HPI: Elaine Cleaning is here today for annual physical and follow up on chronic conditions including:    SI joint dysfunction of left side- had completed PT in past with good results, having flare up of left sided back pain again. Wants to do PT again with Sarah Scott. On semaglutide 1 mg/week; having some heartburn, but otherwise tolerating it well. She has lost about 5 lbs since starting. GERD: on BID pantoprazole- fair control  IBS: on prn bentyl; taking daily  Has seen GI- Dr. Karen Norwood in past    Hypertension:  Home blood pressure monitoring: No. Patient denies chest pain, shortness of breath, headache, lightheadedness, blurred vision, peripheral edema, palpitations, dry cough, and fatigue. Antihypertensive medication side effects: no medication side effects noted. Use of agents associated with hypertension: NSAIDS and thyroid hormones. Sodium (mmol/L)   Date Value   06/02/2023 139    BUN (mg/dL)   Date Value   06/02/2023 15    Glucose (mg/dL)   Date Value   06/02/2023 94      Potassium (mmol/L)   Date Value   06/02/2023 4.0    Creatinine (mg/dL)   Date Value   06/02/2023 1.0         Hyperlipidemia:  Working on lifestyle modifications with diet, exercise and weight loss.   The 10-year ASCVD risk score (Bib BORREGO, et al., 2019) is: 3%    Values used to calculate the score:      Age: 59 years      Sex: Female      Is Non- : No      Diabetic: No      Tobacco smoker: No      Systolic Blood Pressure: 664 mmHg      Is BP treated: No      HDL Cholesterol: 59 mg/dL      Total Cholesterol: 212 mg/dL    Lab Results   Component Value Date    CHOL 212 (H) 06/02/2023    TRIG 96 06/02/2023    HDL 59 06/02/2023    LDLCALC 134 (H) 06/02/2023     Lab Results   Component

## 2023-06-05 ENCOUNTER — TELEPHONE (OUTPATIENT)
Dept: FAMILY MEDICINE CLINIC | Age: 58
End: 2023-06-05

## 2023-06-05 NOTE — TELEPHONE ENCOUNTER
----- Message from WALESKA Daily CNP sent at 6/2/2023 10:15 PM EDT -----  Regarding: mammogram from [facility]  Could someone please update HM - Rajni Petersen had her mammogram completed at [facility] on 3/17/2023, result in [address]. Thanks!

## 2023-07-17 NOTE — TELEPHONE ENCOUNTER
Lab Results   Component Value Date    EGFR 44 07/17/2023    EGFR 41 07/16/2023    EGFR 36 07/15/2023    CREATININE 1.22 07/17/2023    CREATININE 1.31 (H) 07/16/2023    CREATININE 1.44 (H) 07/15/2023       -Renal function does appear to be improving with diuretic therapy   -would increase home Bumex to 1 mg daily and have sent message to Nephrology team to have patient seen and evaluated in office that she did miss her appointment unfortunately due to hospitalization.  -Would have patient check BMP in 1 week to monitor renal function and electrolytes on new medical therapy. Patient calling she will be out of medication   Leaving for vacation in about an hour advised cannot guarantee will be refilled right away   Patient stating pharmacy advised they sent request a few days ago do not see any requests in chart.

## 2023-07-21 ENCOUNTER — HOSPITAL ENCOUNTER (OUTPATIENT)
Dept: WOMENS IMAGING | Age: 58
Discharge: HOME | End: 2023-07-21
Payer: COMMERCIAL

## 2023-07-21 DIAGNOSIS — N95.1 MENOPAUSAL STATE: ICD-10-CM

## 2023-07-21 DIAGNOSIS — G43.111 INTRACTABLE MIGRAINE WITH AURA WITH STATUS MIGRAINOSUS: ICD-10-CM

## 2023-07-21 PROCEDURE — 77080 DXA BONE DENSITY AXIAL: CPT

## 2023-07-21 RX ORDER — BUTALBITAL, ASPIRIN, AND CAFFEINE 325; 50; 40 MG/1; MG/1; MG/1
CAPSULE ORAL
Qty: 40 CAPSULE | Refills: 0 | Status: SHIPPED | OUTPATIENT
Start: 2023-07-21 | End: 2023-08-20

## 2023-07-24 DIAGNOSIS — K21.9 GASTROESOPHAGEAL REFLUX DISEASE WITHOUT ESOPHAGITIS: ICD-10-CM

## 2023-07-24 RX ORDER — PANTOPRAZOLE SODIUM 40 MG/1
TABLET, DELAYED RELEASE ORAL
Qty: 180 TABLET | Refills: 1 | Status: SHIPPED | OUTPATIENT
Start: 2023-07-24

## 2023-08-17 ENCOUNTER — TELEPHONE (OUTPATIENT)
Dept: FAMILY MEDICINE CLINIC | Age: 58
End: 2023-08-17

## 2023-08-17 RX ORDER — SEMAGLUTIDE 2.4 MG/.75ML
2.4 INJECTION, SOLUTION SUBCUTANEOUS
Qty: 3 ML | Refills: 3 | Status: SHIPPED | OUTPATIENT
Start: 2023-08-17

## 2023-08-17 NOTE — TELEPHONE ENCOUNTER
Does not have diabetes diagnosis so due to supply issues meds are being reserved for diabetics.   Will send in [facility]ANCELMO TEE (same drug) at 2.4 mg.

## 2023-08-18 NOTE — TELEPHONE ENCOUNTER
Pt calling in, she was told by pharm that wegovy needs a PA to fill.   Pharm [facility]    Please advise  Pt can be reached at [phone]

## 2023-08-21 NOTE — TELEPHONE ENCOUNTER
Submitted PA for YNES ELDRIDGE  Via [facility] Key: [id number]  STATUS: APPROVED THROUGH 02/17/2024. Follow up done daily; if no response in three days we will refax for status check. If another three days goes by with no response we will call the insurance for status. If this requires a response please respond to the pool. [facility]). Please advise patient thank you.

## 2023-09-20 DIAGNOSIS — K58.9 IRRITABLE BOWEL SYNDROME, UNSPECIFIED TYPE: ICD-10-CM

## 2023-09-20 RX ORDER — DICYCLOMINE HCL 20 MG
TABLET ORAL
Qty: 270 TABLET | Refills: 1 | Status: SHIPPED | OUTPATIENT
Start: 2023-09-20

## 2023-09-26 DIAGNOSIS — G43.111 INTRACTABLE MIGRAINE WITH AURA WITH STATUS MIGRAINOSUS: ICD-10-CM

## 2023-09-26 RX ORDER — BUTALBITAL, ASPIRIN, AND CAFFEINE 325; 50; 40 MG/1; MG/1; MG/1
CAPSULE ORAL
Qty: 40 CAPSULE | Refills: 1 | Status: SHIPPED | OUTPATIENT
Start: 2023-09-26 | End: 2023-11-25

## 2023-10-14 DIAGNOSIS — G43.111 INTRACTABLE MIGRAINE WITH AURA WITH STATUS MIGRAINOSUS: ICD-10-CM

## 2023-10-16 RX ORDER — METOPROLOL SUCCINATE 25 MG/1
25 TABLET, EXTENDED RELEASE ORAL DAILY
Qty: 90 TABLET | Refills: 1 | Status: SHIPPED | OUTPATIENT
Start: 2023-10-16

## 2023-11-27 DIAGNOSIS — M17.0 PRIMARY OSTEOARTHRITIS OF BOTH KNEES: ICD-10-CM

## 2023-11-27 RX ORDER — MELOXICAM 7.5 MG/1
7.5 TABLET ORAL DAILY
Qty: 90 TABLET | Refills: 1 | Status: SHIPPED | OUTPATIENT
Start: 2023-11-27

## 2023-12-01 ENCOUNTER — OFFICE VISIT (OUTPATIENT)
Dept: FAMILY MEDICINE CLINIC | Age: 58
End: 2023-12-01
Payer: COMMERCIAL

## 2023-12-01 VITALS
SYSTOLIC BLOOD PRESSURE: 126 MMHG | HEIGHT: 63 IN | DIASTOLIC BLOOD PRESSURE: 84 MMHG | OXYGEN SATURATION: 95 % | HEART RATE: 88 BPM | WEIGHT: 155 LBS | BODY MASS INDEX: 27.46 KG/M2

## 2023-12-01 DIAGNOSIS — E78.5 HYPERLIPIDEMIA WITH TARGET LDL LESS THAN 130: ICD-10-CM

## 2023-12-01 DIAGNOSIS — E03.9 ACQUIRED HYPOTHYROIDISM: ICD-10-CM

## 2023-12-01 DIAGNOSIS — J45.20 MILD INTERMITTENT ASTHMA, UNSPECIFIED WHETHER COMPLICATED: ICD-10-CM

## 2023-12-01 DIAGNOSIS — I10 ESSENTIAL HYPERTENSION: Primary | ICD-10-CM

## 2023-12-01 DIAGNOSIS — E66.3 OVERWEIGHT (BMI 25.0-29.9): ICD-10-CM

## 2023-12-01 DIAGNOSIS — E55.9 VITAMIN D DEFICIENCY: ICD-10-CM

## 2023-12-01 DIAGNOSIS — Z23 NEED FOR INFLUENZA VACCINATION: ICD-10-CM

## 2023-12-01 PROCEDURE — 3074F SYST BP LT 130 MM HG: CPT

## 2023-12-01 PROCEDURE — 90674 CCIIV4 VAC NO PRSV 0.5 ML IM: CPT

## 2023-12-01 PROCEDURE — 90471 IMMUNIZATION ADMIN: CPT

## 2023-12-01 PROCEDURE — 99214 OFFICE O/P EST MOD 30 MIN: CPT

## 2023-12-01 PROCEDURE — 3079F DIAST BP 80-89 MM HG: CPT

## 2023-12-01 ASSESSMENT — ENCOUNTER SYMPTOMS
SHORTNESS OF BREATH: 0
ABDOMINAL PAIN: 0

## 2023-12-01 NOTE — PROGRESS NOTES
12/1/2023    This is a 59 y.o. female   Chief Complaint   Patient presents with    Hypertension     6 mo f/u BP check   . HPI  Down about 20 lbs since starting Wegovy  Tolerating medication well- no side effects    GERD: taking pantoprazole 40 mg BID  IBS: taking bentyl as needed    Asthma: on daily singulair with PRN albuterol    Hypertension:  Home blood pressure monitoring: No. Patient denies chest pain, shortness of breath, headache, lightheadedness, blurred vision, peripheral edema, palpitations, dry cough, and fatigue. Antihypertensive medication side effects: no medication side effects noted. Use of agents associated with hypertension: thyroid hormones. Sodium (mmol/L)   Date Value   06/02/2023 139    BUN (mg/dL)   Date Value   06/02/2023 15    Glucose (mg/dL)   Date Value   06/02/2023 94      Potassium (mmol/L)   Date Value   06/02/2023 4.0    Creatinine (mg/dL)   Date Value   06/02/2023 1.0         Hyperlipidemia:  Not on medications; working on lifestyle management. Lab Results   Component Value Date    CHOL 212 (H) 06/02/2023    TRIG 96 06/02/2023    HDL 59 06/02/2023    LDLCALC 134 (H) 06/02/2023     Lab Results   Component Value Date    ALT 9 (L) 06/02/2023    AST 15 06/02/2023           Patient Active Problem List   Diagnosis    Menopausal syndrome    IBS (irritable bowel syndrome)    Asthma    Essential hypertension    GERD (gastroesophageal reflux disease)    Allergic rhinitis    Migraine with aura    Cervical dysplasia- S/P hyst, PAPs per gyn    Acquired hypothyroidism    Vitamin D deficiency    Chronic sinusitis    Osteopenia    Sacroiliac joint dysfunction of left side    Hyperlipidemia with target LDL less than 130    Biliary dyskinesia    Primary osteoarthritis of both knees    Hx of skin cancer, basal cell    Family history of breast cancer in mother    Overweight (BMI 25.0-29. 9)       Current Outpatient Medications   Medication Sig Dispense Refill

## 2023-12-29 ENCOUNTER — OFFICE VISIT (OUTPATIENT)
Dept: FAMILY MEDICINE CLINIC | Age: 58
End: 2023-12-29
Payer: COMMERCIAL

## 2023-12-29 VITALS
WEIGHT: 154 LBS | BODY MASS INDEX: 27.29 KG/M2 | DIASTOLIC BLOOD PRESSURE: 88 MMHG | HEIGHT: 63 IN | OXYGEN SATURATION: 98 % | RESPIRATION RATE: 16 BRPM | HEART RATE: 93 BPM | SYSTOLIC BLOOD PRESSURE: 142 MMHG

## 2023-12-29 DIAGNOSIS — I10 ESSENTIAL HYPERTENSION: ICD-10-CM

## 2023-12-29 DIAGNOSIS — Z01.810 PREOP CARDIOVASCULAR EXAM: ICD-10-CM

## 2023-12-29 DIAGNOSIS — S83.242S TEAR OF MEDIAL MENISCUS OF LEFT KNEE, UNSPECIFIED TEAR TYPE, UNSPECIFIED WHETHER OLD OR CURRENT TEAR, SEQUELA: Primary | ICD-10-CM

## 2023-12-29 DIAGNOSIS — J45.20 MILD INTERMITTENT ASTHMA WITHOUT COMPLICATION: ICD-10-CM

## 2023-12-29 LAB
ANION GAP SERPL CALCULATED.3IONS-SCNC: 12 MMOL/L (ref 3–16)
BUN SERPL-MCNC: 18 MG/DL (ref 7–20)
CALCIUM SERPL-MCNC: 9 MG/DL (ref 8.3–10.6)
CHLORIDE SERPL-SCNC: 103 MMOL/L (ref 99–110)
CO2 SERPL-SCNC: 25 MMOL/L (ref 21–32)
CREAT SERPL-MCNC: 1 MG/DL (ref 0.6–1.2)
GFR SERPLBLD CREATININE-BSD FMLA CKD-EPI: >60 ML/MIN/{1.73_M2}
GLUCOSE SERPL-MCNC: 87 MG/DL (ref 70–99)
POTASSIUM SERPL-SCNC: 3.9 MMOL/L (ref 3.5–5.1)
SODIUM SERPL-SCNC: 140 MMOL/L (ref 136–145)

## 2023-12-29 PROCEDURE — 99213 OFFICE O/P EST LOW 20 MIN: CPT | Performed by: FAMILY MEDICINE

## 2023-12-29 PROCEDURE — 93000 ELECTROCARDIOGRAM COMPLETE: CPT | Performed by: FAMILY MEDICINE

## 2023-12-29 PROCEDURE — 3079F DIAST BP 80-89 MM HG: CPT | Performed by: FAMILY MEDICINE

## 2023-12-29 PROCEDURE — 3077F SYST BP >= 140 MM HG: CPT | Performed by: FAMILY MEDICINE

## 2023-12-29 NOTE — PATIENT INSTRUCTIONS
INSTRUCTIONS  HOLD meloxicam and Fiorinol for 7 days before surgery. AM of surgery take the following with a sip of water: metoprolol. PLEASE GET BLOODWORK DRAWN TODAY ON FIRST FLOOR in 170. Take orders with you. RESULTS- most blood tests back in couple days. We will call you if any problems. If bloodwork good, you will get letter in mail or notified thru [facility] (if signed up) within 2 weeks. If you do not, please call office.

## 2023-12-29 NOTE — PROGRESS NOTES
insight good  Facial expressions full, mood appropriate  No memory disturbance noted    Cardiographics  EKG: normal sinus rhythm, unchanged from previous tracings. No acute changes. Lab Review   Lab Results   Component Value Date     06/02/2023    K 4.0 06/02/2023    CREATININE 1.0 06/02/2023     Lab Results   Component Value Date    WBC 11.7 (H) 02/09/2018    HGB 14.5 02/09/2018    HCT 43.4 02/09/2018    MCV 91.4 02/09/2018     02/09/2018     Lab Results   Component Value Date    LABA1C 5.5 05/26/2017      Assessment:    59 y.o. female with planned surgery as above. Diagnosis Orders   1. Tear of medial meniscus of left knee, unspecified tear type, unspecified whether old or current tear, sequela        2. Preop cardiovascular exam  EKG 12 Lead - Clinic Performed      3. Essential hypertension        4. Mild intermittent asthma without complication            Plan:   Ok to proceed with the surgery. Low cardiac risk. Pt. advised to stop all Rx except anti-hypertensives the a.m. of surgery. Patient advised to hold blood thinning medication 7 days prior to procedure. Prophylaxis for cardiac events with perioperative beta-blockers: not indicated  Post-op DVT prophylaxis as deemed necessary by surgery team.      Carlyle Lennox, MD      INSTRUCTIONS  HOLD meloxicam and Fiorinol for 7 days before surgery. AM of surgery take the following with a sip of water: metoprolol. PLEASE GET BLOODWORK DRAWN TODAY ON FIRST FLOOR in 170. Take orders with you. RESULTS- most blood tests back in couple days. We will call you if any problems. If bloodwork good, you will get letter in mail or notified thru [facility] (if signed up) within 2 weeks. If you do not, please call office.

## 2024-01-30 ENCOUNTER — TELEPHONE (OUTPATIENT)
Dept: FAMILY MEDICINE CLINIC | Age: 59
End: 2024-01-30

## 2024-01-30 ENCOUNTER — OFFICE VISIT (OUTPATIENT)
Dept: FAMILY MEDICINE CLINIC | Age: 59
End: 2024-01-30
Payer: COMMERCIAL

## 2024-01-30 VITALS
OXYGEN SATURATION: 98 % | DIASTOLIC BLOOD PRESSURE: 90 MMHG | WEIGHT: 149 LBS | SYSTOLIC BLOOD PRESSURE: 128 MMHG | HEART RATE: 82 BPM | BODY MASS INDEX: 26.4 KG/M2 | RESPIRATION RATE: 16 BRPM | HEIGHT: 63 IN

## 2024-01-30 DIAGNOSIS — R07.9 CHEST PAIN, UNSPECIFIED TYPE: ICD-10-CM

## 2024-01-30 DIAGNOSIS — R07.9 CHEST PAIN, UNSPECIFIED TYPE: Primary | ICD-10-CM

## 2024-01-30 LAB — TROPONIN, HIGH SENSITIVITY: 7 NG/L (ref 0–14)

## 2024-01-30 PROCEDURE — 99213 OFFICE O/P EST LOW 20 MIN: CPT | Performed by: INTERNAL MEDICINE

## 2024-01-30 PROCEDURE — 3080F DIAST BP >= 90 MM HG: CPT | Performed by: INTERNAL MEDICINE

## 2024-01-30 PROCEDURE — 93000 ELECTROCARDIOGRAM COMPLETE: CPT | Performed by: INTERNAL MEDICINE

## 2024-01-30 PROCEDURE — 3074F SYST BP LT 130 MM HG: CPT | Performed by: INTERNAL MEDICINE

## 2024-01-30 ASSESSMENT — PATIENT HEALTH QUESTIONNAIRE - PHQ9
SUM OF ALL RESPONSES TO PHQ QUESTIONS 1-9: 0
SUM OF ALL RESPONSES TO PHQ9 QUESTIONS 1 & 2: 0
SUM OF ALL RESPONSES TO PHQ QUESTIONS 1-9: 0
SUM OF ALL RESPONSES TO PHQ QUESTIONS 1-9: 0
1. LITTLE INTEREST OR PLEASURE IN DOING THINGS: 0
SUM OF ALL RESPONSES TO PHQ QUESTIONS 1-9: 0
2. FEELING DOWN, DEPRESSED OR HOPELESS: 0

## 2024-01-30 NOTE — TELEPHONE ENCOUNTER
Pt calling in, this morning her right shoulder pain going into her chest. She feels something pulling when moving her arm but concerns that her chest hurts with it.     Triaged pt  Per thanh schedule today

## 2024-02-05 DIAGNOSIS — G43.111 INTRACTABLE MIGRAINE WITH AURA WITH STATUS MIGRAINOSUS: ICD-10-CM

## 2024-02-06 RX ORDER — BUTALBITAL, ASPIRIN, AND CAFFEINE 325; 50; 40 MG/1; MG/1; MG/1
CAPSULE ORAL
Qty: 40 CAPSULE | Refills: 2 | Status: SHIPPED | OUTPATIENT
Start: 2024-02-06 | End: 2024-05-06

## 2024-02-29 ENCOUNTER — TELEPHONE (OUTPATIENT)
Dept: FAMILY MEDICINE CLINIC | Age: 59
End: 2024-02-29

## 2024-02-29 RX ORDER — SEMAGLUTIDE 2.4 MG/.75ML
2.4 INJECTION, SOLUTION SUBCUTANEOUS
Qty: 3 ML | Refills: 3 | Status: SHIPPED | OUTPATIENT
Start: 2024-02-29

## 2024-02-29 NOTE — TELEPHONE ENCOUNTER
Pt is needing a refill wegovy 2.4mg  Her pharm is stating that she needs a PA before it can be filled     She does have 2 shots left but orders it early due to having issues getting it refill  Pharm is confirmed- [facility]

## 2024-03-06 DIAGNOSIS — K21.9 GASTROESOPHAGEAL REFLUX DISEASE WITHOUT ESOPHAGITIS: ICD-10-CM

## 2024-03-06 RX ORDER — PANTOPRAZOLE SODIUM 40 MG/1
TABLET, DELAYED RELEASE ORAL
Qty: 180 TABLET | Refills: 1 | Status: SHIPPED | OUTPATIENT
Start: 2024-03-06

## 2024-03-22 ENCOUNTER — OFFICE VISIT (OUTPATIENT)
Dept: FAMILY MEDICINE CLINIC | Age: 59
End: 2024-03-22
Payer: COMMERCIAL

## 2024-03-22 VITALS
HEART RATE: 93 BPM | RESPIRATION RATE: 12 BRPM | SYSTOLIC BLOOD PRESSURE: 137 MMHG | BODY MASS INDEX: 26.33 KG/M2 | OXYGEN SATURATION: 98 % | DIASTOLIC BLOOD PRESSURE: 81 MMHG | WEIGHT: 148.6 LBS | HEIGHT: 63 IN

## 2024-03-22 DIAGNOSIS — S29.012A MUSCLE STRAIN OF UPPER BACK: Primary | ICD-10-CM

## 2024-03-22 DIAGNOSIS — G43.109 MIGRAINE WITH AURA AND WITHOUT STATUS MIGRAINOSUS, NOT INTRACTABLE: ICD-10-CM

## 2024-03-22 PROCEDURE — 3079F DIAST BP 80-89 MM HG: CPT

## 2024-03-22 PROCEDURE — 99213 OFFICE O/P EST LOW 20 MIN: CPT

## 2024-03-22 PROCEDURE — 3075F SYST BP GE 130 - 139MM HG: CPT

## 2024-03-22 RX ORDER — SUMATRIPTAN 6 MG/.5ML
6 INJECTION, SOLUTION SUBCUTANEOUS
Qty: 0.5 ML | Refills: 3 | Status: SHIPPED | OUTPATIENT
Start: 2024-03-22 | End: 2024-03-22

## 2024-03-22 RX ORDER — CYCLOBENZAPRINE HCL 5 MG
TABLET ORAL
Qty: 30 TABLET | Refills: 0 | Status: SHIPPED | OUTPATIENT
Start: 2024-03-22

## 2024-03-22 ASSESSMENT — ENCOUNTER SYMPTOMS
BACK PAIN: 1
COLOR CHANGE: 0
SHORTNESS OF BREATH: 0

## 2024-03-22 NOTE — PROGRESS NOTES
3/22/2024    This is a 58 y.o. female   Chief Complaint   Patient presents with    Back Pain     Pt has had back pain in between her shoulder blades for X3 weeks.    .    HPI    Migraines- has been getting nauseated with the nasal imitrex.  Oral imitrex not effective.  Would like to go on the injectable imitrex    Has pain in between her shoulder blades x3 weeks  No known RY- thinks it could be that she has been helping carry her granddaughter's backpack which is heavy  Improves with heat  On daily meloxicam which has not taken the pain away.  No numbness, tingling or radiating pain  No red flag symptoms  No neck pain     Patient Active Problem List   Diagnosis    Menopausal syndrome    IBS (irritable bowel syndrome)    Asthma    Essential hypertension    GERD (gastroesophageal reflux disease)    Allergic rhinitis    Migraine with aura    Cervical dysplasia- S/P hyst, PAPs per gyn    Acquired hypothyroidism    Vitamin D deficiency    Chronic sinusitis    Osteopenia    Sacroiliac joint dysfunction of left side    Hyperlipidemia with target LDL less than 130    Biliary dyskinesia    Primary osteoarthritis of both knees    Hx of skin cancer, basal cell    Family history of breast cancer in mother    Overweight (BMI 25.0-29.9)       Current Outpatient Medications   Medication Sig Dispense Refill    SUMAtriptan (IMITREX) 6 MG/0.5ML SOLN injection Inject 0.5 mLs into the skin once as needed for Migraine If migraine persists, may repeat in >1 hour 0.5 mL 3    cyclobenzaprine (FLEXERIL) 5 MG tablet Take 1-2 tablets by mouth nightly as needed for muscle spasms 30 tablet 0    pantoprazole (PROTONIX) 40 MG tablet TAKE 1 TABLET BY MOUTH TWICE A  tablet 1    Semaglutide-Weight Management (WEGOVY) 2.4 MG/0.75ML SOAJ SC injection Inject 2.4 mg into the skin every 7 days 3 mL 3    butalbital-aspirin-caffeine (FIORINAL) -40 MG capsule TAKE 1 CAPSULE BY MOUTH EVERY 6 HOURS AS NEEDED FOR HEADACHE. MAX 40 PER MONTH 40

## 2024-04-08 DIAGNOSIS — G43.111 INTRACTABLE MIGRAINE WITH AURA WITH STATUS MIGRAINOSUS: ICD-10-CM

## 2024-04-08 DIAGNOSIS — K58.9 IRRITABLE BOWEL SYNDROME, UNSPECIFIED TYPE: ICD-10-CM

## 2024-04-09 RX ORDER — DICYCLOMINE HCL 20 MG
TABLET ORAL
Qty: 270 TABLET | Refills: 1 | Status: SHIPPED | OUTPATIENT
Start: 2024-04-09

## 2024-04-09 RX ORDER — METOPROLOL SUCCINATE 25 MG/1
25 TABLET, EXTENDED RELEASE ORAL DAILY
Qty: 90 TABLET | Refills: 1 | Status: SHIPPED | OUTPATIENT
Start: 2024-04-09

## 2024-06-04 ENCOUNTER — OFFICE VISIT (OUTPATIENT)
Dept: FAMILY MEDICINE CLINIC | Age: 59
End: 2024-06-04
Payer: COMMERCIAL

## 2024-06-04 VITALS
DIASTOLIC BLOOD PRESSURE: 80 MMHG | BODY MASS INDEX: 26.86 KG/M2 | RESPIRATION RATE: 12 BRPM | OXYGEN SATURATION: 98 % | HEIGHT: 63 IN | SYSTOLIC BLOOD PRESSURE: 128 MMHG | WEIGHT: 151.6 LBS | HEART RATE: 84 BPM

## 2024-06-04 DIAGNOSIS — Z71.89 ACP (ADVANCE CARE PLANNING): ICD-10-CM

## 2024-06-04 DIAGNOSIS — J45.20 MILD INTERMITTENT ASTHMA WITHOUT COMPLICATION: ICD-10-CM

## 2024-06-04 DIAGNOSIS — E78.5 HYPERLIPIDEMIA WITH TARGET LDL LESS THAN 130: ICD-10-CM

## 2024-06-04 DIAGNOSIS — E55.9 VITAMIN D DEFICIENCY: ICD-10-CM

## 2024-06-04 DIAGNOSIS — G43.119 INTRACTABLE MIGRAINE WITH AURA WITHOUT STATUS MIGRAINOSUS: ICD-10-CM

## 2024-06-04 DIAGNOSIS — E66.3 OVERWEIGHT (BMI 25.0-29.9): ICD-10-CM

## 2024-06-04 DIAGNOSIS — E03.9 ACQUIRED HYPOTHYROIDISM: ICD-10-CM

## 2024-06-04 DIAGNOSIS — Z00.00 ENCOUNTER FOR WELL ADULT EXAM WITHOUT ABNORMAL FINDINGS: ICD-10-CM

## 2024-06-04 DIAGNOSIS — Z00.00 ENCOUNTER FOR WELL ADULT EXAM WITHOUT ABNORMAL FINDINGS: Primary | ICD-10-CM

## 2024-06-04 DIAGNOSIS — J30.9 ALLERGIC RHINITIS, UNSPECIFIED SEASONALITY, UNSPECIFIED TRIGGER: ICD-10-CM

## 2024-06-04 DIAGNOSIS — M17.0 PRIMARY OSTEOARTHRITIS OF BOTH KNEES: ICD-10-CM

## 2024-06-04 DIAGNOSIS — K58.9 IRRITABLE BOWEL SYNDROME, UNSPECIFIED TYPE: ICD-10-CM

## 2024-06-04 DIAGNOSIS — K21.9 GASTROESOPHAGEAL REFLUX DISEASE WITHOUT ESOPHAGITIS: ICD-10-CM

## 2024-06-04 DIAGNOSIS — I10 ESSENTIAL HYPERTENSION: ICD-10-CM

## 2024-06-04 LAB
25(OH)D3 SERPL-MCNC: 119.1 NG/ML
ALBUMIN SERPL-MCNC: 4.2 G/DL (ref 3.4–5)
ALBUMIN/GLOB SERPL: 1.6 {RATIO} (ref 1.1–2.2)
ALP SERPL-CCNC: 114 U/L (ref 40–129)
ALT SERPL-CCNC: 9 U/L (ref 10–40)
ANION GAP SERPL CALCULATED.3IONS-SCNC: 12 MMOL/L (ref 3–16)
AST SERPL-CCNC: 14 U/L (ref 15–37)
BILIRUB SERPL-MCNC: 0.5 MG/DL (ref 0–1)
BUN SERPL-MCNC: 13 MG/DL (ref 7–20)
CALCIUM SERPL-MCNC: 10 MG/DL (ref 8.3–10.6)
CHLORIDE SERPL-SCNC: 100 MMOL/L (ref 99–110)
CHOLEST SERPL-MCNC: 198 MG/DL (ref 0–199)
CO2 SERPL-SCNC: 25 MMOL/L (ref 21–32)
CREAT SERPL-MCNC: 0.9 MG/DL (ref 0.6–1.2)
GFR SERPLBLD CREATININE-BSD FMLA CKD-EPI: 73 ML/MIN/{1.73_M2}
GLUCOSE SERPL-MCNC: 83 MG/DL (ref 70–99)
HDLC SERPL-MCNC: 63 MG/DL (ref 40–60)
LDLC SERPL CALC-MCNC: 111 MG/DL
POTASSIUM SERPL-SCNC: 4.4 MMOL/L (ref 3.5–5.1)
PROT SERPL-MCNC: 6.9 G/DL (ref 6.4–8.2)
SODIUM SERPL-SCNC: 137 MMOL/L (ref 136–145)
TRIGL SERPL-MCNC: 119 MG/DL (ref 0–150)
TSH SERPL DL<=0.005 MIU/L-ACNC: 0.49 UIU/ML (ref 0.27–4.2)
VLDLC SERPL CALC-MCNC: 24 MG/DL

## 2024-06-04 PROCEDURE — 3079F DIAST BP 80-89 MM HG: CPT

## 2024-06-04 PROCEDURE — 99396 PREV VISIT EST AGE 40-64: CPT

## 2024-06-04 PROCEDURE — 3074F SYST BP LT 130 MM HG: CPT

## 2024-06-04 RX ORDER — MELOXICAM 7.5 MG/1
7.5 TABLET ORAL DAILY
Qty: 90 TABLET | Refills: 1 | Status: SHIPPED | OUTPATIENT
Start: 2024-06-04

## 2024-06-04 SDOH — ECONOMIC STABILITY: FOOD INSECURITY: WITHIN THE PAST 12 MONTHS, YOU WORRIED THAT YOUR FOOD WOULD RUN OUT BEFORE YOU GOT MONEY TO BUY MORE.: NEVER TRUE

## 2024-06-04 SDOH — ECONOMIC STABILITY: INCOME INSECURITY: HOW HARD IS IT FOR YOU TO PAY FOR THE VERY BASICS LIKE FOOD, HOUSING, MEDICAL CARE, AND HEATING?: NOT HARD AT ALL

## 2024-06-04 SDOH — ECONOMIC STABILITY: FOOD INSECURITY: WITHIN THE PAST 12 MONTHS, THE FOOD YOU BOUGHT JUST DIDN'T LAST AND YOU DIDN'T HAVE MONEY TO GET MORE.: NEVER TRUE

## 2024-06-04 NOTE — PATIENT INSTRUCTIONS
Check into coverage for [facility]    Patient Education   Advance Care Planning     Advance Care Planning opens a door to talk about and write down your wishes before a sudden accident or illness.  Make your goals, values, and preferences known.     This puts you in the ’s seat and helps others know what matters most to you so they won’t have to guess.      Where can you learn more?    Go to [email or web address]   to learn how to:    Name someone you trust to make healthcare decisions for you, only if you can’t. (Healthcare Power of )    Document your wishes for care if you were seriously ill and not expected to recover or are approaching end of life. (Advance Directive or Living Will)    The same page can be found using the QR code below.
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
## 2024-06-05 ENCOUNTER — CLINICAL DOCUMENTATION (OUTPATIENT)
Dept: SPIRITUAL SERVICES | Age: 59
End: 2024-06-05

## 2024-06-05 LAB
EST. AVERAGE GLUCOSE BLD GHB EST-MCNC: 91.1 MG/DL
HBA1C MFR BLD: 4.8 %

## 2024-06-05 NOTE — ACP (ADVANCE CARE PLANNING)
Advance Care Planning   Ambulatory ACP Specialist Patient Outreach    Date:  6/5/2024    ACP Specialist:  Lois Ann    Outreach call to patient in follow-up to ACP Specialist referral from:Laura Carter APRN - CNP    [x] PCP  [] Provider   [] Ambulatory Care Management [] Other     For:                  [] Advance Directive Assistance              [] Complete Portable DNR order              [] Complete POST/POLST/MOST              [] Code Status Discussion             [] Discuss Goals of Care             [x] Early ACP Decision-Making              [] Other (Specify)    Date Referral Received: 6/4/2024    Next Step:   [] ACP scheduled conversation  [] Outreach again in one week               [] Email / Mail ACP Info Sheets  [] Email / Mail Advance Directive   [x] Closing referral.  Routing closure to referring provider/staff and to ACP Specialist .    [] Closure letter mailed to patient with invitation to contact ACP Specialist if / when ready.   [] Other (Specify here):         [x] At this time, Healthcare Decision Maker Is:    Advance Care Planning   Healthcare Decision Maker:    Primary Decision Maker: Wei Collier - Spouse - [phone]    Secondary Decision Maker: Reta Horton - Child - [phone]      [] Primary agent named in scanned advance directive.    [x] Legal Next of Kin.     [] Unable to determine legal decision maker at this time.       Outreaches:       [x] 1st -  Date:  6/5/2024               Intervention:  [x] Spoke with Patient   [] Left Voice mail [] Email / Mail    [] [employer]  [] Other (Specify) :     Outcomes:  Writer attempted ACP outreach to the one number listed for both - patient's home and mobile ([phone]) - spoke to patient.  Patient declined ACP Specialist assistance at this time due to pressing family obligations.  Patient expressed interest for her and her spouse to schedule an ACP conversation in the future.  Patient agreeable to receiving ACP information

## 2024-06-20 DIAGNOSIS — E03.9 ACQUIRED HYPOTHYROIDISM: ICD-10-CM

## 2024-06-20 DIAGNOSIS — J30.9 ALLERGIC RHINITIS, UNSPECIFIED SEASONALITY, UNSPECIFIED TRIGGER: ICD-10-CM

## 2024-06-21 RX ORDER — LEVOTHYROXINE SODIUM 0.1 MG/1
100 TABLET ORAL DAILY
Qty: 90 TABLET | Refills: 1 | Status: SHIPPED | OUTPATIENT
Start: 2024-06-21

## 2024-06-21 RX ORDER — MONTELUKAST SODIUM 10 MG/1
TABLET ORAL
Qty: 90 TABLET | Refills: 1 | Status: SHIPPED | OUTPATIENT
Start: 2024-06-21

## 2024-07-11 NOTE — PROGRESS NOTES
Value Date    LABA1C 5.5 2017     The 10-year ASCVD risk score (Veronica Royal, et al., 2013) is: 2.3%    Values used to calculate the score:      Age: 45 years      Sex: Female      Is Non- : No      Diabetic: No      Tobacco smoker: No      Systolic Blood Pressure: 202 mmHg      Is BP treated: Yes      HDL Cholesterol: 56 mg/dL      Total Cholesterol: 184 mg/dL  VISIT NOTE   Subjective:   HPI CHRONIC:   Chief Complaint   Patient presents with    Hypertension     hasnt switched to new BP medication yet    Patient here for follow-up of multiple chronic conditions includin. Essential hypertension    2. Intractable migraine with aura with status migrainosus      · Following appropriate diet? Sometimes  · Exercise: walking at work  · Taking medicines daily as directed? Yes  · Any side effects of medications? No    Migraines increased with cold weather    ROS:  General ROS: negative for - chills, fatigue or fever  Hematological and Lymphatic ROS: negative for - blood clots or weight loss  Respiratory ROS: no cough, shortness of breath, or wheezing  Cardiovascular ROS: no chest pain or dyspnea on exertion  Gastrointestinal ROS: no abdominal pain, change in bowel habits, or black or bloody stools  Genito-Urinary ROS: no dysuria, trouble voiding, or hematuria  Neurological ROS: no TIA or stroke symptoms    HISTORY:  Patient's medications, allergies, past medical, surgical, social and family histories were reviewed and updated as appropriate (See above). Objective:   PHYSICAL EXAM   /80 (Site: Right Arm, Position: Sitting, Cuff Size: Large Adult)   Pulse 88   Temp 98.1 °F (36.7 °C) (Oral)   Resp 20   Ht 5' 4\" (1.626 m)   Wt 170 lb (77.1 kg)   BMI 29.18 kg/m²   Blood pressure is Fair. BP Readings from Last 5 Encounters:   12/15/17 132/80   10/27/17 128/80   17 130/88   17 118/70   10/19/16 134/84     Weight is increased.    Wt Readings from Last 5 Encounters: 12/15/17 170 lb (77.1 kg)   10/27/17 167 lb (75.8 kg)   06/20/17 163 lb (73.9 kg)   05/26/17 170 lb (77.1 kg)   10/19/16 168 lb (76.2 kg)      GENERAL:   · well-developed, well-nourished, alert, no distress. EYES:   · External findings: lids and lashes normal and conjunctivae and sclerae normal  LUNGS:    · Breathing unlabored  · clear to auscultation bilaterally and good air movement  · Palpation: Normal  CARDIOVASC:   · regular rate and rhythm, S1, S2 normal. No murmur, click, rub or gallop  · Apical impulse normal  LEGS:  Lower extremity edema: none    PSYCH:    · Alert and oriented  · Normal reasoning, insight good  · Facial expressions full, mood appropriate, some anxiety  · No memory disturbance noted     Assessment and Plan:     1. Essential hypertension     2. Intractable migraine with aura with status migrainosus       RISK FACTORS AND COUNSELLING  · Behavioral Risks- Inadequate physical activity  · At risk for nutritional issues. Counseling provided on the following healthy behaviors: nutrition and exercise. INSTRUCTIONS  · NEXT APPOINTMENT: Please schedule check-up in 3 months. · PLEASE TAKE THIS FORM TO CHECK-OUT WINDOW TO SCHEDULE NEXT VISIT.   · REFILL POLICY:  If not getting refills today, then PLEASE make next appointment on way out today. Will need to see that future appointment scheudled when pharmmcy contacts Dr. Roni Ramirez for a refill. · When out of losartan, switch to metoprolol. Will need weekly BP checks for a months after that. · Start topimax for migraine reduction. · Eat less, move more! You can do it! 7

## 2024-08-12 DIAGNOSIS — G43.111 INTRACTABLE MIGRAINE WITH AURA WITH STATUS MIGRAINOSUS: ICD-10-CM

## 2024-08-13 RX ORDER — BUTALBITAL, ASPIRIN, AND CAFFEINE 325; 50; 40 MG/1; MG/1; MG/1
CAPSULE ORAL
Qty: 40 CAPSULE | Refills: 2 | Status: SHIPPED | OUTPATIENT
Start: 2024-08-13 | End: 2024-11-11

## 2024-08-28 RX ORDER — SEMAGLUTIDE 2.4 MG/.75ML
INJECTION, SOLUTION SUBCUTANEOUS
Qty: 3 ML | Refills: 3 | Status: SHIPPED | OUTPATIENT
Start: 2024-08-28

## 2024-09-04 DIAGNOSIS — K21.9 GASTROESOPHAGEAL REFLUX DISEASE WITHOUT ESOPHAGITIS: ICD-10-CM

## 2024-09-04 RX ORDER — PANTOPRAZOLE SODIUM 40 MG/1
TABLET, DELAYED RELEASE ORAL
Qty: 180 TABLET | Refills: 1 | Status: SHIPPED | OUTPATIENT
Start: 2024-09-04

## 2024-10-06 DIAGNOSIS — G43.111 INTRACTABLE MIGRAINE WITH AURA WITH STATUS MIGRAINOSUS: ICD-10-CM

## 2024-10-07 RX ORDER — METOPROLOL SUCCINATE 25 MG/1
25 TABLET, EXTENDED RELEASE ORAL DAILY
Qty: 90 TABLET | Refills: 1 | Status: SHIPPED | OUTPATIENT
Start: 2024-10-07

## 2024-11-27 DIAGNOSIS — M17.0 PRIMARY OSTEOARTHRITIS OF BOTH KNEES: ICD-10-CM

## 2024-11-27 RX ORDER — MELOXICAM 7.5 MG/1
7.5 TABLET ORAL DAILY
Qty: 90 TABLET | Refills: 0 | Status: SHIPPED | OUTPATIENT
Start: 2024-11-27

## 2024-12-10 ENCOUNTER — OFFICE VISIT (OUTPATIENT)
Dept: FAMILY MEDICINE CLINIC | Age: 59
End: 2024-12-10

## 2024-12-10 VITALS
RESPIRATION RATE: 16 BRPM | DIASTOLIC BLOOD PRESSURE: 70 MMHG | BODY MASS INDEX: 27.64 KG/M2 | WEIGHT: 156 LBS | OXYGEN SATURATION: 98 % | HEART RATE: 84 BPM | HEIGHT: 63 IN | SYSTOLIC BLOOD PRESSURE: 128 MMHG

## 2024-12-10 DIAGNOSIS — J30.9 ALLERGIC RHINITIS, UNSPECIFIED SEASONALITY, UNSPECIFIED TRIGGER: ICD-10-CM

## 2024-12-10 DIAGNOSIS — E66.3 OVERWEIGHT (BMI 25.0-29.9): ICD-10-CM

## 2024-12-10 DIAGNOSIS — I10 ESSENTIAL HYPERTENSION: Primary | ICD-10-CM

## 2024-12-10 DIAGNOSIS — Z23 NEED FOR PNEUMOCOCCAL VACCINE: ICD-10-CM

## 2024-12-10 DIAGNOSIS — E03.9 ACQUIRED HYPOTHYROIDISM: ICD-10-CM

## 2024-12-10 DIAGNOSIS — E78.5 HYPERLIPIDEMIA WITH TARGET LDL LESS THAN 130: ICD-10-CM

## 2024-12-10 DIAGNOSIS — G43.111 INTRACTABLE MIGRAINE WITH AURA WITH STATUS MIGRAINOSUS: ICD-10-CM

## 2024-12-10 RX ORDER — MONTELUKAST SODIUM 10 MG/1
TABLET ORAL
Qty: 90 TABLET | Refills: 1 | Status: SHIPPED | OUTPATIENT
Start: 2024-12-10

## 2024-12-10 RX ORDER — LEVOTHYROXINE SODIUM 100 UG/1
100 TABLET ORAL DAILY
Qty: 90 TABLET | Refills: 1 | Status: SHIPPED | OUTPATIENT
Start: 2024-12-10

## 2024-12-10 RX ORDER — BUTALBITAL, ASPIRIN, AND CAFFEINE 325; 50; 40 MG/1; MG/1; MG/1
CAPSULE ORAL
Qty: 40 CAPSULE | Refills: 2 | Status: SHIPPED | OUTPATIENT
Start: 2024-12-10 | End: 2025-03-10

## 2024-12-10 ASSESSMENT — ENCOUNTER SYMPTOMS
ABDOMINAL PAIN: 0
SHORTNESS OF BREATH: 0

## 2024-12-10 NOTE — PROGRESS NOTES
12/10/2024    This is a 59 y.o. female   Chief Complaint   Patient presents with    Hypertension     Pt is here for a f/u    .    EULALIA Mcginnis is here today for routine follow up on chronic conditions.    Concerns: insurance will no longer cover wegovy.  Will cover Ozempic.    Weight: tolerating the wegovy, but weight loss has plateaued.  She is an emotional eater.    Hypertension:  Home blood pressure monitoring: No.  She is not adherent to a low sodium diet. Patient denies chest pain, shortness of breath, headache, lightheadedness, blurred vision, peripheral edema, palpitations, dry cough, and fatigue.  Antihypertensive medication side effects: no medication side effects noted.  Use of agents associated with hypertension: thyroid hormones.                                        Sodium (mmol/L)   Date Value   06/04/2024 137    BUN (mg/dL)   Date Value   06/04/2024 13    Glucose (mg/dL)   Date Value   06/04/2024 83      Potassium (mmol/L)   Date Value   06/04/2024 4.4    Creatinine (mg/dL)   Date Value   06/04/2024 0.9         Hyperlipidemia:  Patient is not following a low fat, low cholesterol diet.  She is not exercising regularly. OTC Supplements: none.    Lab Results   Component Value Date    CHOL 198 06/04/2024    TRIG 119 06/04/2024    HDL 63 (H) 06/04/2024     Lab Results   Component Value Date    ALT 9 (L) 06/04/2024    AST 14 (L) 06/04/2024        Hypothyroidism: Recent symptoms: none. She denies fatigue, weight gain, weight loss, cold intolerance, heat intolerance, hair loss, dry skin, constipation, diarrhea, edema, anxiety, tremor, palpitations, and dysphagia. Patient is  taking her medication consistently on an empty stomach.    No components found for: \"TSHREFLEX\"  Lab Results   Component Value Date    TSH 0.49 06/04/2024    TSH 1.27 06/02/2023    TSH 2.33 07/06/2022     Migraines: have been bad with weather fluxuations.  Able to manage with fiorinal and imitrex.  Getting nauseated with them.  Has not

## 2024-12-16 ENCOUNTER — TELEPHONE (OUTPATIENT)
Dept: FAMILY MEDICINE CLINIC | Age: 59
End: 2024-12-16

## 2024-12-16 RX ORDER — SEMAGLUTIDE 2.4 MG/.75ML
INJECTION, SOLUTION SUBCUTANEOUS
Qty: 3 ML | Refills: 3 | OUTPATIENT
Start: 2024-12-16

## 2024-12-16 NOTE — TELEPHONE ENCOUNTER
Ozempic was sent in on 12/10 to [facility]. Not denied.    Ozempic and Toroy are same medicine. Ozempic is for diabetes.

## 2024-12-16 NOTE — TELEPHONE ENCOUNTER
Ins will not cover Ozempic as she is not diabetic  They have been covering the Wegovy for weight loss so gave verbal for this. She will lose the Wegovy in January so can try PA at that time

## 2024-12-16 NOTE — TELEPHONE ENCOUNTER
Pt calling in, stated that the new year her insurance will no longer cover weygovy they are submitting forms to get ozempic covered. Pt only has one injection left and would like to know why refill was denied. She would like to get as much of the weygovy as she can before the end of the year so the insurance will cover it.    Please advise  Pt can be reached at [phone]

## 2025-01-03 ENCOUNTER — TELEPHONE (OUTPATIENT)
Dept: FAMILY MEDICINE CLINIC | Age: 60
End: 2025-01-03

## 2025-01-03 DIAGNOSIS — E66.3 OVERWEIGHT (BMI 25.0-29.9): Primary | ICD-10-CM

## 2025-01-03 NOTE — TELEPHONE ENCOUNTER
Sent to [facility] as ozempic but no diabetes diagnosis.    Do rhey cover it as Wejovy for weight loss?

## 2025-01-03 NOTE — TELEPHONE ENCOUNTER
Pt is requesting a new Rx for ozempic 2.0 be sent to Pharm because new insurance will cover with a PA   Will send to PA team after Rx is sent

## 2025-01-03 NOTE — TELEPHONE ENCOUNTER
Patient's new insurance will cover the Ozempic now, she said she is waiting to hear about the PA    Please Advise

## 2025-01-06 NOTE — TELEPHONE ENCOUNTER
Most do if criteria is met.    Submitted PA for Ozempic (2 MG/DOSE) 8MG/3ML pen-injectors  Via CMM Key: [id number] STATUS: PENDING.    Follow up done daily; if no decision with in three days we will refax.  If another three days goes by with no decision will call the insurance for status.

## 2025-01-07 NOTE — TELEPHONE ENCOUNTER
The medication was DENIED; DENIAL letter is uploaded to MEDIA.    Other; please see Denial Letter.     Note :    This is covered for Type 2 diabetes.     If you want an APPEAL; please note in this encounter what new information you would like to APPEAL with.  Once complete route back to PA POOL.    If this requires a response please respond to the pool ( P MHCX PSC MEDICATION PRE-AUTH).      Thank you please advise patient.

## 2025-01-07 NOTE — TELEPHONE ENCOUNTER
Please let Rahel know that the ozempic is only covered if she is type 2 diabetic which she is not.  We can send to the compounding pharmacy if she would like, I think it is usually between $200-$300/month

## 2025-01-20 DIAGNOSIS — K58.9 IRRITABLE BOWEL SYNDROME, UNSPECIFIED TYPE: ICD-10-CM

## 2025-01-21 RX ORDER — DICYCLOMINE HCL 20 MG
TABLET ORAL
Qty: 270 TABLET | Refills: 1 | Status: SHIPPED | OUTPATIENT
Start: 2025-01-21

## 2025-01-27 ENCOUNTER — TELEPHONE (OUTPATIENT)
Dept: FAMILY MEDICINE CLINIC | Age: 60
End: 2025-01-27

## 2025-01-27 NOTE — TELEPHONE ENCOUNTER
Pt saw dermatologist  [facility] last week she had a rash all over her chest   Pt had labs done called TTO done and was told it was a 63 should range from 0-34   Pt was told to follow up with her PCP   Pt had labs done last June   Should pt be concerned ?    Pt need appointment ?

## 2025-01-28 NOTE — TELEPHONE ENCOUNTER
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

## 2025-02-06 ENCOUNTER — OFFICE VISIT (OUTPATIENT)
Dept: FAMILY MEDICINE CLINIC | Age: 60
End: 2025-02-06

## 2025-02-06 VITALS
OXYGEN SATURATION: 97 % | TEMPERATURE: 98.3 F | DIASTOLIC BLOOD PRESSURE: 82 MMHG | RESPIRATION RATE: 20 BRPM | HEIGHT: 63 IN | WEIGHT: 157.4 LBS | HEART RATE: 62 BPM | SYSTOLIC BLOOD PRESSURE: 132 MMHG | BODY MASS INDEX: 27.89 KG/M2

## 2025-02-06 DIAGNOSIS — J10.1 INFLUENZA A: ICD-10-CM

## 2025-02-06 DIAGNOSIS — J06.9 URI WITH COUGH AND CONGESTION: Primary | ICD-10-CM

## 2025-02-06 LAB
INFLUENZA A ANTIBODY: POSITIVE
INFLUENZA B ANTIBODY: NORMAL
Lab: NORMAL
PERFORMING INSTRUMENT: NORMAL
QC PASS/FAIL: NORMAL
SARS-COV-2, POC: NORMAL

## 2025-02-06 RX ORDER — OSELTAMIVIR PHOSPHATE 75 MG/1
75 CAPSULE ORAL 2 TIMES DAILY
Qty: 10 CAPSULE | Refills: 0 | Status: SHIPPED | OUTPATIENT
Start: 2025-02-06 | End: 2025-02-11

## 2025-02-06 RX ORDER — DEXTROMETHORPHAN HYDROBROMIDE AND PROMETHAZINE HYDROCHLORIDE 15; 6.25 MG/5ML; MG/5ML
5 SYRUP ORAL 4 TIMES DAILY PRN
Qty: 140 ML | Refills: 0 | Status: SHIPPED | OUTPATIENT
Start: 2025-02-06 | End: 2025-02-13

## 2025-02-06 SDOH — ECONOMIC STABILITY: FOOD INSECURITY: WITHIN THE PAST 12 MONTHS, YOU WORRIED THAT YOUR FOOD WOULD RUN OUT BEFORE YOU GOT MONEY TO BUY MORE.: NEVER TRUE

## 2025-02-06 SDOH — ECONOMIC STABILITY: FOOD INSECURITY: WITHIN THE PAST 12 MONTHS, THE FOOD YOU BOUGHT JUST DIDN'T LAST AND YOU DIDN'T HAVE MONEY TO GET MORE.: NEVER TRUE

## 2025-02-06 ASSESSMENT — ENCOUNTER SYMPTOMS
SINUS PAIN: 1
COUGH: 1
SINUS PRESSURE: 1
NAUSEA: 1
SHORTNESS OF BREATH: 0
SORE THROAT: 1
VOMITING: 0
RHINORRHEA: 1
ABDOMINAL PAIN: 0

## 2025-02-06 NOTE — PROGRESS NOTES
2/6/2025    This is a 59 y.o. female   Chief Complaint   Patient presents with    Cough     Started Monday.  Cough.  Headache.  Dry cough.  Sinus drainage.  Pressure. Chills. Fatigue. Nausea.    .    HPI  History of Present Illness  The patient is a 59-year-old female who presents for evaluation of influenza.    She began experiencing a mild cough on Monday 2/3/25, which has since escalated in severity. She reported an episode of nausea yesterday while visiting her mother at a [facility] care facility, attributing it to the heat in the unit. She has not experienced any vomiting, abdominal pain, or diarrhea. She has been able to maintain her food intake. She also reports dyspnea upon exertion, specifically when walking from a distant parking spot. She does not have any chest pain but notes generalized soreness due to coughing. She attempted to self-medicate with an old cough syrup but discontinued its use due to associated nausea. She has been using ibuprofen to manage her headache.    She describes a sensation of alternating chills and heat but is uncertain about the presence of a fever. She experienced a headache reminiscent of her previous COVID-19 infection and tested negative for COVID-19 this morning. The headache was severe enough to disrupt her sleep at 3:00 AM, prompting her to take her temperature, which read 100.1, 99.1, and 98.7 degrees Fahrenheit consecutively. After taking ibuprofen, her temperature decreased to 97 degrees Fahrenheit at 5:00 AM. She has not taken any medication since 3:00 AM.            Patient Active Problem List   Diagnosis    Menopausal syndrome    IBS (irritable bowel syndrome)    Asthma    Essential hypertension    GERD (gastroesophageal reflux disease)    Allergic rhinitis    Migraine with aura    Cervical dysplasia- S/P hyst, PAPs per gyn    Acquired hypothyroidism    Vitamin D deficiency    Chronic sinusitis    Osteopenia    Sacroiliac joint dysfunction of left side

## 2025-02-24 DIAGNOSIS — M17.0 PRIMARY OSTEOARTHRITIS OF BOTH KNEES: ICD-10-CM

## 2025-02-25 RX ORDER — MELOXICAM 7.5 MG/1
7.5 TABLET ORAL DAILY
Qty: 90 TABLET | Refills: 1 | Status: SHIPPED | OUTPATIENT
Start: 2025-02-25

## 2025-03-18 DIAGNOSIS — K21.9 GASTROESOPHAGEAL REFLUX DISEASE WITHOUT ESOPHAGITIS: ICD-10-CM

## 2025-03-18 RX ORDER — PANTOPRAZOLE SODIUM 40 MG/1
TABLET, DELAYED RELEASE ORAL
Qty: 180 TABLET | Refills: 1 | Status: SHIPPED | OUTPATIENT
Start: 2025-03-18

## 2025-03-28 ENCOUNTER — TELEPHONE (OUTPATIENT)
Dept: FAMILY MEDICINE CLINIC | Age: 60
End: 2025-03-28

## 2025-03-28 DIAGNOSIS — G43.111 INTRACTABLE MIGRAINE WITH AURA WITH STATUS MIGRAINOSUS: ICD-10-CM

## 2025-03-28 DIAGNOSIS — B37.31 YEAST VAGINITIS: Primary | ICD-10-CM

## 2025-03-28 RX ORDER — METOPROLOL SUCCINATE 25 MG/1
25 TABLET, EXTENDED RELEASE ORAL DAILY
Qty: 90 TABLET | Refills: 1 | Status: SHIPPED | OUTPATIENT
Start: 2025-03-28

## 2025-03-28 RX ORDER — FLUCONAZOLE 150 MG/1
150 TABLET ORAL
Qty: 2 TABLET | Refills: 0 | Status: SHIPPED | OUTPATIENT
Start: 2025-03-28 | End: 2025-04-03

## 2025-03-28 NOTE — TELEPHONE ENCOUNTER
Please notify patient that prescription was e-scribed to pharmacy   See us if not better. Next week

## 2025-03-28 NOTE — TELEPHONE ENCOUNTER
Patient is on an antibiotic because she had oral surgery and since she is taking the antibiotic she has a yeast infection     She is requesting Diflucan to help with the yeast infection    Ph[facility]    Please Advise

## 2025-05-06 ENCOUNTER — OFFICE VISIT (OUTPATIENT)
Dept: FAMILY MEDICINE CLINIC | Age: 60
End: 2025-05-06

## 2025-05-06 VITALS
RESPIRATION RATE: 20 BRPM | OXYGEN SATURATION: 97 % | DIASTOLIC BLOOD PRESSURE: 96 MMHG | BODY MASS INDEX: 30.65 KG/M2 | TEMPERATURE: 98 F | HEIGHT: 63 IN | WEIGHT: 173 LBS | SYSTOLIC BLOOD PRESSURE: 144 MMHG | HEART RATE: 96 BPM

## 2025-05-06 DIAGNOSIS — J06.9 URI WITH COUGH AND CONGESTION: Primary | ICD-10-CM

## 2025-05-06 RX ORDER — DEXTROMETHORPHAN HYDROBROMIDE AND PROMETHAZINE HYDROCHLORIDE 15; 6.25 MG/5ML; MG/5ML
5 SYRUP ORAL 4 TIMES DAILY PRN
Qty: 140 ML | Refills: 0 | Status: SHIPPED | OUTPATIENT
Start: 2025-05-06 | End: 2025-05-13

## 2025-05-06 ASSESSMENT — PATIENT HEALTH QUESTIONNAIRE - PHQ9
1. LITTLE INTEREST OR PLEASURE IN DOING THINGS: NOT AT ALL
1. LITTLE INTEREST OR PLEASURE IN DOING THINGS: NOT AT ALL
SUM OF ALL RESPONSES TO PHQ QUESTIONS 1-9: 0
SUM OF ALL RESPONSES TO PHQ QUESTIONS 1-9: 0
SUM OF ALL RESPONSES TO PHQ9 QUESTIONS 1 & 2: 0
SUM OF ALL RESPONSES TO PHQ QUESTIONS 1-9: 0
SUM OF ALL RESPONSES TO PHQ QUESTIONS 1-9: 0
2. FEELING DOWN, DEPRESSED OR HOPELESS: NOT AT ALL
2. FEELING DOWN, DEPRESSED OR HOPELESS: NOT AT ALL

## 2025-05-06 ASSESSMENT — ENCOUNTER SYMPTOMS
COUGH: 1
WHEEZING: 0
SHORTNESS OF BREATH: 1
SORE THROAT: 1
ABDOMINAL PAIN: 0
RHINORRHEA: 1

## 2025-05-06 NOTE — PROGRESS NOTES
5/6/2025    This is a 59 y.o. female   Chief Complaint   Patient presents with    Cough     The last few days.  Got worse this morning.  Allergies have been bad.  Cough. Yellow mucous. No fever.  Runny nose.  Sinus drainage.  Losing voice.  Fatigue. Sor throat.   .    HPI  History of Present Illness  The patient is a 59-year-old female who is being evaluated for illness.    She began experiencing a mild cough last week, which escalated in severity yesterday, leading to frequent coughing fits. This morning, she experienced voice loss, which has since improved. She reports sinus congestion, rhinorrhea, postnasal drip, and a sore throat. She does not typically experience fevers. She reports feeling fatigued. She also reports mild shortness of breath and wheezing, particularly when wearing a mask. She has been experiencing headaches and mild dizziness, which prompted her to start taking Mucinex. She has been self-medicating with Mucinex and ibuprofen. She has been experiencing ear discomfort, which she believes is due to drainage. She has been performing warm salt water gargles and drinking hot tea in the morning. She has been taking Mucinex for approximately 4 to 5 days.       Her insurance no longer covers Wegovy, and she continues to gain weight, which she believes is causing her blood pressure to rise. She started taking Ozempic due to prediabetes but discontinued it after losing weight and no longer meeting the criteria for prediabetes. However, she has since regained 10 pounds.          Patient Active Problem List   Diagnosis    Menopausal syndrome    IBS (irritable bowel syndrome)    Asthma    Essential hypertension    GERD (gastroesophageal reflux disease)    Allergic rhinitis    Migraine with aura    Cervical dysplasia- S/P hyst, PAPs per gyn    Acquired hypothyroidism    Vitamin D deficiency    Chronic sinusitis    Osteopenia    Sacroiliac joint dysfunction of left side    Hyperlipidemia with target LDL less

## 2025-06-02 DIAGNOSIS — E03.9 ACQUIRED HYPOTHYROIDISM: ICD-10-CM

## 2025-06-02 DIAGNOSIS — J30.9 ALLERGIC RHINITIS, UNSPECIFIED SEASONALITY, UNSPECIFIED TRIGGER: ICD-10-CM

## 2025-06-03 RX ORDER — MONTELUKAST SODIUM 10 MG/1
10 TABLET ORAL NIGHTLY
Qty: 90 TABLET | Refills: 1 | Status: SHIPPED | OUTPATIENT
Start: 2025-06-03

## 2025-06-03 RX ORDER — LEVOTHYROXINE SODIUM 100 UG/1
100 TABLET ORAL DAILY
Qty: 90 TABLET | Refills: 1 | Status: SHIPPED | OUTPATIENT
Start: 2025-06-03

## 2025-06-20 ENCOUNTER — OFFICE VISIT (OUTPATIENT)
Dept: FAMILY MEDICINE CLINIC | Age: 60
End: 2025-06-20

## 2025-06-20 VITALS
RESPIRATION RATE: 16 BRPM | HEART RATE: 77 BPM | DIASTOLIC BLOOD PRESSURE: 84 MMHG | OXYGEN SATURATION: 98 % | SYSTOLIC BLOOD PRESSURE: 132 MMHG | BODY MASS INDEX: 32.02 KG/M2 | HEIGHT: 62 IN | WEIGHT: 174 LBS

## 2025-06-20 DIAGNOSIS — E78.5 HYPERLIPIDEMIA WITH TARGET LDL LESS THAN 130: ICD-10-CM

## 2025-06-20 DIAGNOSIS — E03.9 ACQUIRED HYPOTHYROIDISM: ICD-10-CM

## 2025-06-20 DIAGNOSIS — I10 ESSENTIAL HYPERTENSION: Primary | ICD-10-CM

## 2025-06-20 DIAGNOSIS — E66.09 CLASS 1 OBESITY DUE TO EXCESS CALORIES WITH SERIOUS COMORBIDITY AND BODY MASS INDEX (BMI) OF 31.0 TO 31.9 IN ADULT: ICD-10-CM

## 2025-06-20 DIAGNOSIS — I10 ESSENTIAL HYPERTENSION: ICD-10-CM

## 2025-06-20 DIAGNOSIS — G43.119 INTRACTABLE MIGRAINE WITH AURA WITHOUT STATUS MIGRAINOSUS: ICD-10-CM

## 2025-06-20 DIAGNOSIS — K21.9 GASTROESOPHAGEAL REFLUX DISEASE WITHOUT ESOPHAGITIS: ICD-10-CM

## 2025-06-20 DIAGNOSIS — E66.811 CLASS 1 OBESITY DUE TO EXCESS CALORIES WITH SERIOUS COMORBIDITY AND BODY MASS INDEX (BMI) OF 31.0 TO 31.9 IN ADULT: ICD-10-CM

## 2025-06-20 DIAGNOSIS — E55.9 VITAMIN D DEFICIENCY: ICD-10-CM

## 2025-06-20 DIAGNOSIS — J30.9 ALLERGIC RHINITIS, UNSPECIFIED SEASONALITY, UNSPECIFIED TRIGGER: ICD-10-CM

## 2025-06-20 LAB
25(OH)D3 SERPL-MCNC: 118 NG/ML
ALBUMIN SERPL-MCNC: 4.3 G/DL (ref 3.4–5)
ALBUMIN/GLOB SERPL: 1.5 {RATIO} (ref 1.1–2.2)
ALP SERPL-CCNC: 118 U/L (ref 40–129)
ALT SERPL-CCNC: 24 U/L (ref 10–40)
ANION GAP SERPL CALCULATED.3IONS-SCNC: 14 MMOL/L (ref 3–16)
AST SERPL-CCNC: 28 U/L (ref 15–37)
BILIRUB SERPL-MCNC: 0.4 MG/DL (ref 0–1)
BUN SERPL-MCNC: 23 MG/DL (ref 7–20)
CALCIUM SERPL-MCNC: 9.9 MG/DL (ref 8.3–10.6)
CHLORIDE SERPL-SCNC: 102 MMOL/L (ref 99–110)
CHOLEST SERPL-MCNC: 255 MG/DL (ref 0–199)
CO2 SERPL-SCNC: 24 MMOL/L (ref 21–32)
CREAT SERPL-MCNC: 0.8 MG/DL (ref 0.6–1.2)
GFR SERPLBLD CREATININE-BSD FMLA CKD-EPI: 83 ML/MIN/{1.73_M2}
GLUCOSE SERPL-MCNC: 89 MG/DL (ref 70–99)
HDLC SERPL-MCNC: 70 MG/DL (ref 40–60)
LDLC SERPL CALC-MCNC: 162 MG/DL
POTASSIUM SERPL-SCNC: 4.2 MMOL/L (ref 3.5–5.1)
PROT SERPL-MCNC: 7.1 G/DL (ref 6.4–8.2)
SODIUM SERPL-SCNC: 140 MMOL/L (ref 136–145)
TRIGL SERPL-MCNC: 117 MG/DL (ref 0–150)
TSH SERPL DL<=0.005 MIU/L-ACNC: 1.17 UIU/ML (ref 0.27–4.2)
VLDLC SERPL CALC-MCNC: 23 MG/DL

## 2025-06-20 ASSESSMENT — ENCOUNTER SYMPTOMS
SHORTNESS OF BREATH: 0
ABDOMINAL PAIN: 0

## 2025-06-20 NOTE — PROGRESS NOTES
6/20/2025    This is a 59 y.o. female   Chief Complaint   Patient presents with    Hypertension     Pt has been fasting    .    EULALIA Mcginnis is here today for routine follow up on chronic conditions.     Concerns: weight gain- had to stop semaglutide due to insurance restrictions    Hypertension:  Home blood pressure monitoring: No. Patient denies chest pain, shortness of breath, headache, lightheadedness, blurred vision, peripheral edema, palpitations, dry cough, and fatigue.  Antihypertensive medication side effects: no medication side effects noted.  Use of agents associated with hypertension: thyroid hormones.                                        Sodium (mmol/L)   Date Value   06/04/2024 137    BUN (mg/dL)   Date Value   06/04/2024 13    Glucose (mg/dL)   Date Value   06/04/2024 83      Potassium (mmol/L)   Date Value   06/04/2024 4.4    Creatinine (mg/dL)   Date Value   06/04/2024 0.9         Hyperlipidemia:  Patient is not following a low fat, low cholesterol diet.  She is not exercising regularly. OTC Supplements: none.    Lab Results   Component Value Date    CHOL 198 06/04/2024    TRIG 119 06/04/2024    HDL 63 (H) 06/04/2024     Lab Results   Component Value Date    ALT 9 (L) 06/04/2024    AST 14 (L) 06/04/2024        The 10-year ASCVD risk score (Bib BORREGO, et al., 2019) is: 4.8%    Values used to calculate the score:      Age: 59 years      Sex: Female      Is Non- : No      Diabetic: No      Tobacco smoker: No      Systolic Blood Pressure: 132 mmHg      Is BP treated: Yes      HDL Cholesterol: 63 mg/dL      Total Cholesterol: 198 mg/dL    Lab Results   Component Value Date    CHOL 198 06/04/2024    TRIG 119 06/04/2024    HDL 63 (H) 06/04/2024     Lab Results   Component Value Date    ALT 9 (L) 06/04/2024    AST 14 (L) 06/04/2024        Hypothyroidism: Recent symptoms: none. She denies fatigue, weight gain, weight loss, cold intolerance, heat intolerance, hair loss, dry skin,

## 2025-06-21 LAB
EST. AVERAGE GLUCOSE BLD GHB EST-MCNC: 108.3 MG/DL
HBA1C MFR BLD: 5.4 %

## 2025-06-23 ENCOUNTER — RESULTS FOLLOW-UP (OUTPATIENT)
Dept: FAMILY MEDICINE CLINIC | Age: 60
End: 2025-06-23

## 2025-06-23 ENCOUNTER — TELEPHONE (OUTPATIENT)
Dept: ADMINISTRATIVE | Age: 60
End: 2025-06-23

## 2025-06-25 NOTE — TELEPHONE ENCOUNTER
The medication was DENIED; DENIAL letter is uploaded to [employer].    Generic Denial: Drug is not covered for off label usage.  This drug is FDA approved for :Type 2 diabetes mellitus  DIABETIC MEDICATION DENIALS:  Other; please see Denial Letter.     Note :        If you want an APPEAL; please note in this encounter what new information you would like to APPEAL with.  Once complete route back to PA POOL.    If this requires a response please respond to the pool ( P [facility] MEDICATION PRE-AUTH).      Thank you please advise patient.

## 2025-06-25 NOTE — TELEPHONE ENCOUNTER
Submitted PA for Semaglutide,0.25 or 0.5MG/DOS, 2 MG/3ML SOPN  Via [facility] (Key: [id number]) STATUS: PENDING.    Follow up done daily; if no decision with in three days we will refax.  If another three days goes by with no decision will call the insurance for status.

## 2025-07-09 DIAGNOSIS — G43.111 INTRACTABLE MIGRAINE WITH AURA WITH STATUS MIGRAINOSUS: ICD-10-CM

## 2025-07-10 RX ORDER — BUTALBITAL, ASPIRIN, AND CAFFEINE 325; 50; 40 MG/1; MG/1; MG/1
CAPSULE ORAL
Qty: 40 CAPSULE | Refills: 0 | Status: SHIPPED | OUTPATIENT
Start: 2025-07-10 | End: 2025-10-08

## 2025-08-01 DIAGNOSIS — G43.109 MIGRAINE WITH AURA AND WITHOUT STATUS MIGRAINOSUS, NOT INTRACTABLE: ICD-10-CM

## 2025-08-01 RX ORDER — CEFUROXIME AXETIL 250 MG/1
TABLET ORAL
Qty: 1 ML | Refills: 5 | Status: SHIPPED | OUTPATIENT
Start: 2025-08-01

## 2025-08-02 DIAGNOSIS — K58.9 IRRITABLE BOWEL SYNDROME, UNSPECIFIED TYPE: ICD-10-CM

## 2025-08-04 ENCOUNTER — TELEPHONE (OUTPATIENT)
Dept: FAMILY MEDICINE CLINIC | Age: 60
End: 2025-08-04

## 2025-08-04 RX ORDER — DICYCLOMINE HCL 20 MG
20 TABLET ORAL 3 TIMES DAILY PRN
Qty: 270 TABLET | Refills: 1 | Status: SHIPPED | OUTPATIENT
Start: 2025-08-04

## 2025-08-05 ENCOUNTER — TELEPHONE (OUTPATIENT)
Dept: FAMILY MEDICINE CLINIC | Age: 60
End: 2025-08-05

## 2025-08-18 DIAGNOSIS — M17.0 PRIMARY OSTEOARTHRITIS OF BOTH KNEES: ICD-10-CM

## 2025-08-18 RX ORDER — MELOXICAM 7.5 MG/1
7.5 TABLET ORAL DAILY
Qty: 90 TABLET | Refills: 1 | Status: SHIPPED | OUTPATIENT
Start: 2025-08-18

## 2025-09-03 ENCOUNTER — OFFICE VISIT (OUTPATIENT)
Dept: FAMILY MEDICINE CLINIC | Age: 60
End: 2025-09-03
Payer: COMMERCIAL

## 2025-09-03 VITALS
TEMPERATURE: 97.8 F | WEIGHT: 186 LBS | BODY MASS INDEX: 34.23 KG/M2 | HEART RATE: 82 BPM | RESPIRATION RATE: 16 BRPM | OXYGEN SATURATION: 96 % | HEIGHT: 62 IN

## 2025-09-03 DIAGNOSIS — R05.1 ACUTE COUGH: ICD-10-CM

## 2025-09-03 DIAGNOSIS — J06.9 URI WITH COUGH AND CONGESTION: ICD-10-CM

## 2025-09-03 DIAGNOSIS — J45.21 MILD INTERMITTENT ASTHMA WITH EXACERBATION: Primary | ICD-10-CM

## 2025-09-03 LAB
Lab: NORMAL
PERFORMING INSTRUMENT: NORMAL
QC PASS/FAIL: NORMAL
SARS-COV-2, POC: NORMAL

## 2025-09-03 PROCEDURE — 99214 OFFICE O/P EST MOD 30 MIN: CPT | Performed by: FAMILY MEDICINE

## 2025-09-03 PROCEDURE — 87426 SARSCOV CORONAVIRUS AG IA: CPT | Performed by: FAMILY MEDICINE

## 2025-09-03 RX ORDER — ALBUTEROL SULFATE 90 UG/1
2 INHALANT RESPIRATORY (INHALATION) EVERY 4 HOURS PRN
Qty: 18 G | Refills: 3 | Status: SHIPPED | OUTPATIENT
Start: 2025-09-03 | End: 2026-09-03

## 2025-09-03 RX ORDER — DEXTROMETHORPHAN HYDROBROMIDE AND PROMETHAZINE HYDROCHLORIDE 15; 6.25 MG/5ML; MG/5ML
5 SYRUP ORAL 4 TIMES DAILY PRN
Qty: 140 ML | Refills: 0 | Status: SHIPPED | OUTPATIENT
Start: 2025-09-03 | End: 2025-09-10

## 2025-09-05 ENCOUNTER — OFFICE VISIT (OUTPATIENT)
Dept: BARIATRICS/WEIGHT MGMT | Age: 60
End: 2025-09-05

## 2025-09-05 VITALS
HEIGHT: 63 IN | WEIGHT: 186 LBS | HEART RATE: 86 BPM | OXYGEN SATURATION: 98 % | BODY MASS INDEX: 32.96 KG/M2 | SYSTOLIC BLOOD PRESSURE: 128 MMHG | RESPIRATION RATE: 18 BRPM | DIASTOLIC BLOOD PRESSURE: 80 MMHG

## 2025-09-05 DIAGNOSIS — E66.01 SEVERE OBESITY (HCC): Primary | ICD-10-CM

## (undated) DEVICE — Device: Brand: MEDEX

## (undated) DEVICE — SYRINGE MED 30ML STD CLR PLAS LUERLOCK TIP N CTRL DISP

## (undated) DEVICE — SURGICAL PROC PACK SHT WEST V4

## (undated) DEVICE — SOLUTION IV IRRIG WATER 500ML POUR BRL ST 2F7113

## (undated) DEVICE — SYRINGE MED 3ML CLR PLAS STD N CTRL LUERLOCK TIP DISP

## (undated) DEVICE — GLOVE SURG SZ 85 L12IN FNGR THK87MIL WHT LTX FREE

## (undated) DEVICE — SYRINGE TB 1ML NDL 25GA L0.625IN PLAS SLIP TIP CONVENTIONAL

## (undated) DEVICE — SURGICAL PROCEDURE PACK PIK PPK374205] ALCON LABORATORIES INC]

## (undated) DEVICE — SOLUTION IRRIG BSS ST 500ML